# Patient Record
Sex: FEMALE | Race: WHITE
[De-identification: names, ages, dates, MRNs, and addresses within clinical notes are randomized per-mention and may not be internally consistent; named-entity substitution may affect disease eponyms.]

---

## 2017-03-23 ENCOUNTER — HOSPITAL ENCOUNTER (OUTPATIENT)
Dept: HOSPITAL 23 - SGUS | Age: 72
Discharge: HOME | End: 2017-03-23
Payer: MEDICARE

## 2017-03-23 DIAGNOSIS — N18.3: Primary | ICD-10-CM

## 2017-04-12 ENCOUNTER — OFFICE (AMBULATORY)
Dept: URBAN - METROPOLITAN AREA CLINIC 75 | Facility: CLINIC | Age: 72
End: 2017-04-12

## 2017-04-12 VITALS
HEIGHT: 67 IN | WEIGHT: 165 LBS | DIASTOLIC BLOOD PRESSURE: 70 MMHG | SYSTOLIC BLOOD PRESSURE: 130 MMHG | HEART RATE: 56 BPM

## 2017-04-12 DIAGNOSIS — Z12.11 ENCOUNTER FOR SCREENING FOR MALIGNANT NEOPLASM OF COLON: ICD-10-CM

## 2017-04-12 DIAGNOSIS — K29.70 GASTRITIS, UNSPECIFIED, WITHOUT BLEEDING: ICD-10-CM

## 2017-04-12 DIAGNOSIS — K30 FUNCTIONAL DYSPEPSIA: ICD-10-CM

## 2017-04-12 DIAGNOSIS — R10.84 GENERALIZED ABDOMINAL PAIN: ICD-10-CM

## 2017-04-12 DIAGNOSIS — K57.30 DIVERTICULOSIS OF LARGE INTESTINE WITHOUT PERFORATION OR ABS: ICD-10-CM

## 2017-04-12 DIAGNOSIS — K44.9 DIAPHRAGMATIC HERNIA WITHOUT OBSTRUCTION OR GANGRENE: ICD-10-CM

## 2017-04-12 DIAGNOSIS — K62.5 HEMORRHAGE OF ANUS AND RECTUM: ICD-10-CM

## 2017-04-12 DIAGNOSIS — K22.70 BARRETT'S ESOPHAGUS WITHOUT DYSPLASIA: ICD-10-CM

## 2017-04-12 DIAGNOSIS — R11.0 NAUSEA: ICD-10-CM

## 2017-04-12 DIAGNOSIS — K31.7 POLYP OF STOMACH AND DUODENUM: ICD-10-CM

## 2017-04-12 PROCEDURE — 99213 OFFICE O/P EST LOW 20 MIN: CPT | Performed by: INTERNAL MEDICINE

## 2017-10-17 VITALS
HEART RATE: 52 BPM | SYSTOLIC BLOOD PRESSURE: 116 MMHG | WEIGHT: 173 LBS | HEIGHT: 67 IN | DIASTOLIC BLOOD PRESSURE: 70 MMHG

## 2017-10-18 ENCOUNTER — OFFICE (AMBULATORY)
Dept: URBAN - METROPOLITAN AREA CLINIC 75 | Facility: CLINIC | Age: 72
End: 2017-10-18

## 2017-10-18 DIAGNOSIS — K44.9 DIAPHRAGMATIC HERNIA WITHOUT OBSTRUCTION OR GANGRENE: ICD-10-CM

## 2017-10-18 DIAGNOSIS — K22.70 BARRETT'S ESOPHAGUS WITHOUT DYSPLASIA: ICD-10-CM

## 2017-10-18 DIAGNOSIS — Z12.11 ENCOUNTER FOR SCREENING FOR MALIGNANT NEOPLASM OF COLON: ICD-10-CM

## 2017-10-18 DIAGNOSIS — K29.70 GASTRITIS, UNSPECIFIED, WITHOUT BLEEDING: ICD-10-CM

## 2017-10-18 DIAGNOSIS — R11.0 NAUSEA: ICD-10-CM

## 2017-10-18 DIAGNOSIS — R10.84 GENERALIZED ABDOMINAL PAIN: ICD-10-CM

## 2017-10-18 DIAGNOSIS — K30 FUNCTIONAL DYSPEPSIA: ICD-10-CM

## 2017-10-18 DIAGNOSIS — K62.5 HEMORRHAGE OF ANUS AND RECTUM: ICD-10-CM

## 2017-10-18 DIAGNOSIS — K57.30 DIVERTICULOSIS OF LARGE INTESTINE WITHOUT PERFORATION OR ABS: ICD-10-CM

## 2017-10-18 DIAGNOSIS — K31.7 POLYP OF STOMACH AND DUODENUM: ICD-10-CM

## 2017-10-18 PROCEDURE — 99213 OFFICE O/P EST LOW 20 MIN: CPT | Performed by: INTERNAL MEDICINE

## 2017-10-18 RX ORDER — PANTOPRAZOLE SODIUM 40 MG/1
40 TABLET, DELAYED RELEASE ORAL
Qty: 90 | Refills: 2 | Status: COMPLETED
End: 2023-11-20

## 2017-10-18 NOTE — SERVICENOTES
patient is seen and examined by me.  Pertinent issues of the case reviewed with ROBBY Hendricks...I have verified the note and agree with the treatment plan

## 2017-10-18 NOTE — SERVICEHPINOTES
10/17/2017  [address]  1945   was seen in our [facility] office today for a follow up visit.  Patient has a history of GERD and Infante's esophagus.  At present, she states that her heartburn is well-controlled on once daily pantoprazole and Zegerid.  She'll have occasional breakthrough symptoms maybe 3 times per month that she associates this with diet.BRhad right thyroidectomy and parathyroid resection since last visit  some tightness in neck since then Of note, she had a thyroidectomy and parathyroidectomy done 10/2016.  Since that time she states that she feels her throat is "tight" however,  does not feel/report that it impacts her ability to swallow.  She denies dysphagia, odynophagia, food impaction, nausea, vomiting, abdominal pain, bloating or distention.  She had an EGD 10/2016 which showed Infante's esophagus without dysplasia.  She does not report any lower GI issues today.  She denies blood in her stool, melena or unexplained weight loss.  She had a colonoscopy in 7/16/2012 that showed moderate diverticulosis of the descending and sigmoid colon.    4/12/2017 [address] 1945 was seen in our [facility] office today for a follow up visit. EGD reviewedBRtells me stage III kidney disease now diagnosed but Cr was 1.2 in 2016 and 1.1 now but GFR is low 44-49BRshe is worried that Protonix did it wants to see a NephrologistBRconstant burning in epigastrium this is not new 10/10/16: EGDBRto have thyroidectomy and a parathyroidectomy on 10/24BRelevated calcium and nodules9/7/16: I did have the pleasure to see Kristi in the office today for routine follow-up. She is due for her next endoscopy to survey her Infante's. Overall she's been doing well from that standpoint she does continue on twice a day Protonix for this ---has been doing for a while. She will occasionally have some throat burning and maybe some acid regurgitation but symptoms of really been under good control recently.She's undergoing a workup for elevated calcium and have a parathyroid evaluation. She is seeing Dr. Lira from endocrinology. Also has nodules on thyroid.Bowel function fairly normal although occasionally erratic she has very severe diverticular disease she's been suffering from some generalized abdominal discomfort recently which have been suspected to be secondary to her calcium disruption however she has some upper abdominal discomfort, goes in a bandlike fashion it usually precedes a bowel movement. She's not had any fevers or chills. She occasionally see some mucus in the stool. She still rarely sees a small amount of blood which we have assumed to be hemorrhoidal. She's not had any massive hematochezia or melena. Her swallowing is good. Her weight is stable areaBR[HPI dictated in exam room in the presence of the patient by Dr. Mcguire using [employer] voice recognition. Document has been proof-read, however some grammatical errors may be present and are unintentional, given the subtleties and finesse of spoken language as such, changes may be made at a later date to rectify these errors]Pertinent positive symptoms include abdominal pain, belching, difficulty swallowing, flatulence/rectal gas, heartburn/reflux, mucous in stools pertinent negative symptoms include black stools, bloating, change in bowel habits, constipation, diarrhea, painful swallowing, nausea, painful stools, rectal bleeding, rectal protusions, rectal urgency, soiling/incontinence, vomiting.****Impression: Overall she seems to be doing fairly well but I think a couple of issues require attention.I would like her to reduce her Protonix to once daily. If her symptoms do not allow her to do that that we will go back to twice daily. In substitution for the second dose of Protonix I suggested some over-the-counter Zegerid which she can use in the evenings if she sees it necessary. This will of course depend on how late she eats because when she lays down at night when she might have regurgitation.We will do her endoscopy to check on her Infante's.I do have concerns about possible some low-grade diverticulitis but I would not treat her with antibiotics right now I would not do a CAT scan nor would I do another colonoscopy but I asked her to change her diet and do some thinks to prevent progression of her diverticular disease. If symptoms persist we will do a CAT scan possibly a brief course of Flagyl but I would not do that right now.I believe her bleeding is secondary to hemorrhoids and I would not do anything further right now because it is sufficiently infrequent if it worsens however she needs to let me know and we will address that.****BR?7/1/15: KRISTI REINOSO is seen today for a follow-up visit. I did have the pleasure to see Kristi in the office today. She her bowel movements have been normal but she has noticed some bloody mucus on the tissue paper after bowel movements. She denies any blood in the stool. Stool calibers been normal. Stool habits have been regular. No anal rectal pain. She has long-standing epigastric discomfort that has been there for years and has not changed. Her reflux is under good control and she has regular check ups for her Infante's esophagus, last of which was September 2014. She does not any urinary symptoms. No vaginal discharge. She states that when she cleans up after a bowel movement she will notice a small amount of blood-tinged mucus on the tissue. This has happened 3 times. The been no weight loss, fevers or chills, and no one else at home is been ill. There has been no recent travel

## 2018-04-18 ENCOUNTER — OFFICE (AMBULATORY)
Dept: URBAN - METROPOLITAN AREA CLINIC 75 | Facility: CLINIC | Age: 73
End: 2018-04-18

## 2018-04-18 VITALS
HEIGHT: 67 IN | SYSTOLIC BLOOD PRESSURE: 146 MMHG | HEART RATE: 60 BPM | DIASTOLIC BLOOD PRESSURE: 80 MMHG | WEIGHT: 173 LBS

## 2018-04-18 DIAGNOSIS — K30 FUNCTIONAL DYSPEPSIA: ICD-10-CM

## 2018-04-18 DIAGNOSIS — K57.30 DIVERTICULOSIS OF LARGE INTESTINE WITHOUT PERFORATION OR ABS: ICD-10-CM

## 2018-04-18 DIAGNOSIS — Z12.11 ENCOUNTER FOR SCREENING FOR MALIGNANT NEOPLASM OF COLON: ICD-10-CM

## 2018-04-18 DIAGNOSIS — K44.9 DIAPHRAGMATIC HERNIA WITHOUT OBSTRUCTION OR GANGRENE: ICD-10-CM

## 2018-04-18 DIAGNOSIS — K31.7 POLYP OF STOMACH AND DUODENUM: ICD-10-CM

## 2018-04-18 DIAGNOSIS — Z95.0 PRESENCE OF CARDIAC PACEMAKER: ICD-10-CM

## 2018-04-18 DIAGNOSIS — R11.0 NAUSEA: ICD-10-CM

## 2018-04-18 DIAGNOSIS — K22.70 BARRETT'S ESOPHAGUS WITHOUT DYSPLASIA: ICD-10-CM

## 2018-04-18 DIAGNOSIS — R10.84 GENERALIZED ABDOMINAL PAIN: ICD-10-CM

## 2018-04-18 DIAGNOSIS — N18.9 CHRONIC KIDNEY DISEASE, UNSPECIFIED: ICD-10-CM

## 2018-04-18 DIAGNOSIS — K29.70 GASTRITIS, UNSPECIFIED, WITHOUT BLEEDING: ICD-10-CM

## 2018-04-18 DIAGNOSIS — K62.5 HEMORRHAGE OF ANUS AND RECTUM: ICD-10-CM

## 2018-04-18 PROCEDURE — 99213 OFFICE O/P EST LOW 20 MIN: CPT | Performed by: INTERNAL MEDICINE

## 2018-04-18 RX ORDER — PANTOPRAZOLE SODIUM 40 MG/1
40 TABLET, DELAYED RELEASE ORAL
Qty: 90 | Refills: 2 | Status: COMPLETED
End: 2023-11-20

## 2018-08-15 ENCOUNTER — APPOINTMENT (OUTPATIENT)
Dept: GENERAL RADIOLOGY | Facility: HOSPITAL | Age: 73
End: 2018-08-15

## 2018-08-15 ENCOUNTER — APPOINTMENT (OUTPATIENT)
Dept: CT IMAGING | Facility: HOSPITAL | Age: 73
End: 2018-08-15

## 2018-08-15 ENCOUNTER — HOSPITAL ENCOUNTER (EMERGENCY)
Facility: HOSPITAL | Age: 73
Discharge: HOME | End: 2018-08-15
Attending: EMERGENCY MEDICINE | Admitting: EMERGENCY MEDICINE

## 2018-08-15 VITALS
WEIGHT: 167 LBS | OXYGEN SATURATION: 100 % | HEART RATE: 61 BPM | RESPIRATION RATE: 18 BRPM | DIASTOLIC BLOOD PRESSURE: 78 MMHG | SYSTOLIC BLOOD PRESSURE: 174 MMHG | TEMPERATURE: 98 F | HEIGHT: 67 IN | BODY MASS INDEX: 26.21 KG/M2

## 2018-08-15 DIAGNOSIS — S46.912A SHOULDER STRAIN, LEFT, INITIAL ENCOUNTER: ICD-10-CM

## 2018-08-15 DIAGNOSIS — S16.1XXA CERVICAL STRAIN, ACUTE, INITIAL ENCOUNTER: ICD-10-CM

## 2018-08-15 DIAGNOSIS — S63.501A WRIST SPRAIN, RIGHT, INITIAL ENCOUNTER: ICD-10-CM

## 2018-08-15 DIAGNOSIS — S09.90XA MINOR HEAD INJURY, INITIAL ENCOUNTER: Primary | ICD-10-CM

## 2018-08-15 PROCEDURE — 70450 CT HEAD/BRAIN W/O DYE: CPT

## 2018-08-15 PROCEDURE — 99284 EMERGENCY DEPT VISIT MOD MDM: CPT

## 2018-08-15 PROCEDURE — 73030 X-RAY EXAM OF SHOULDER: CPT

## 2018-08-15 PROCEDURE — 72125 CT NECK SPINE W/O DYE: CPT

## 2018-08-15 PROCEDURE — 73110 X-RAY EXAM OF WRIST: CPT

## 2018-08-15 RX ORDER — FUROSEMIDE 40 MG/1
40 TABLET ORAL DAILY PRN
COMMUNITY
End: 2021-02-09

## 2018-08-15 RX ORDER — ACETAMINOPHEN 500 MG
1000 TABLET ORAL ONCE
Status: COMPLETED | OUTPATIENT
Start: 2018-08-15 | End: 2018-08-15

## 2018-08-15 RX ADMIN — ACETAMINOPHEN 1000 MG: 500 TABLET, FILM COATED ORAL at 09:32

## 2018-08-15 NOTE — DISCHARGE INSTRUCTIONS
Pt instructions:  Rest  Follow-up with your primary care doctor to schedule outpatient CAT scan with contrast to further evaluate aneurysm  Follow up with Primary Care Doctor for further management and to have your blood pressure rechecked.   Return to ER with pain, swelling, numbness/tingling, fever, chills, weakness, nausea, vomiting, diarrhea, abdominal pain, back pain, urinary concerns, chest pain, shortness of breath, dizziness, headache, worsening of symptoms or other concerns.

## 2018-08-15 NOTE — ED PROVIDER NOTES
MD ATTESTATION NOTE    Pt presents to the ED complaining of right wrist pain, left shoulder pain and a blow to the head s/p trip and fall earlier today. Pt's R wrist XR shows nothing acute. On exam, the pt has tenderness and swelling along her right distal radius without obvious deformity. Pt is NVI distally. I agree with the plan to discharge the pt home in a wrist splint.    The ALICIA and I have discussed this patient's history, physical exam, and treatment plan.  I have reviewed the documentation and personally had a face to face interaction with the patient. I affirm the documentation and agree with the treatment and plan.  The attached note describes my personal findings.    Documentation assistance provided by carlos Fuentes for Dr. Alex.  Information recorded by the bessieibe was done at my direction and has been verified and validated by me.       Susu Fuentes  08/15/18 [id number]       Simeon Alex MD  08/15/18 [id number]

## 2018-08-15 NOTE — ED PROVIDER NOTES
EMERGENCY DEPARTMENT ENCOUNTER    CHIEF COMPLAINT  Chief Complaint: injuries after a fall  History given by:patient  History limited by:nothing  Room Number: 26/26  PMD: Renita Price MD      HPI:  Pt is a 73 y.o. female who presents with injury sustained after fall.  Patient was walking into the hospital when she tripped on the curb and fell onto the grass.  Also reports hitting her head but denies LOC.  Patient also was complaining of neck pain, left shoulder pain, right wrist pain.  Patient denies chest pain, shortness of breath, dizziness, or confusion prior to or after the fall.  Patient states that she's been under a lot of stress lately because her  is in the hospital critically ill and she was in a hurry to get to him.     Duration: PTA  Location:head, neck, left shoulder, right wrist  Radiation:denies  Quality:sore/throbbing  Intensity/Severity:mild  Associated Symptoms: swelling of right wrist  Aggravating Factors:movement  Alleviating Factors:rest  Previous Episodes:denies  Treatment before arrival:none    PAST MEDICAL HISTORY  Active Ambulatory Problems     Diagnosis Date Noted   • No Active Ambulatory Problems     Resolved Ambulatory Problems     Diagnosis Date Noted   • No Resolved Ambulatory Problems     Past Medical History:   Diagnosis Date   • Acid reflux    • Arthritis    • Francisco esophagus    • Cancer (CMS/HCC)    • CHF (congestive heart failure) (CMS/HCC)    • Disease of thyroid gland    • Heart murmur    • Hyperlipidemia    • Hypertension    • Leaky heart valve    • Osteopenia    • Renal disorder    • Renal insufficiency syndrome    • Sick sinus syndrome due to SA node dysfunction (CMS/HCC)        PAST SURGICAL HISTORY  Past Surgical History:   Procedure Laterality Date   • CYST REMOVAL Right    • HYSTERECTOMY     • PACEMAKER IMPLANTATION     • PARATHYROID GLAND SURGERY     • THYROID SURGERY         FAMILY HISTORY  History reviewed. No pertinent family history.    SOCIAL  HISTORY  Social History     Social History   • Marital status:      Spouse name: N/A   • Number of children: N/A   • Years of education: N/A     Occupational History   • Not on file.     Social History Main Topics   • Smoking status: Former Smoker     Packs/day: 1.50     Years: 25.00     Types: Cigarettes     Quit date: 1991   • Smokeless tobacco: Not on file   • Alcohol use Yes      Comment: occasional   • Drug use: No   • Sexual activity: Defer     Other Topics Concern   • Not on file     Social History Narrative   • No narrative on file         ALLERGIES  Codeine and Morphine and related    REVIEW OF SYSTEMS  Review of Systems   Constitutional: Negative.  Negative for activity change, appetite change ( decreased), chills, fatigue and fever.   HENT: Negative for congestion, ear pain, rhinorrhea and sore throat.    Eyes: Negative.    Respiratory: Negative.  Negative for cough and shortness of breath.    Cardiovascular: Negative.  Negative for chest pain, palpitations and leg swelling ( pedal).   Gastrointestinal: Negative.  Negative for abdominal pain, constipation, diarrhea, nausea and vomiting.   Endocrine: Negative.    Genitourinary: Negative.  Negative for decreased urine volume, difficulty urinating, dysuria, menstrual problem, pelvic pain, urgency and vaginal discharge.   Musculoskeletal: Positive for arthralgias ( right wrist and left shoulder), back pain and neck pain.   Skin: Negative.  Negative for rash.   Allergic/Immunologic: Negative.    Neurological: Positive for headaches. Negative for dizziness, weakness, light-headedness and numbness.   Hematological: Negative.    Psychiatric/Behavioral: Negative.  The patient is not nervous/anxious.    All other systems reviewed and are negative.      PHYSICAL EXAM  ED Triage Vitals [08/15/18 0830]   Temp Heart Rate Resp BP SpO2   98 °F (36.7 °C) 80 18 -- 100 %      Temp src Heart Rate Source Patient Position BP Location FiO2 (%)   Tympanic Monitor -- -- --        Physical Exam   Constitutional: She is well-developed, well-nourished, and in no distress. No distress.   HENT:   Head: Normocephalic and atraumatic.   Mouth/Throat: Oropharynx is clear and moist and mucous membranes are normal.   Eyes: Pupils are equal, round, and reactive to light.   Neck: Normal range of motion.   Cardiovascular: Normal rate, regular rhythm and normal heart sounds.    Pulses:       Radial pulses are 2+ on the right side, and 2+ on the left side.   Pulmonary/Chest: Effort normal and breath sounds normal. She has no wheezes.   Abdominal: Soft. Bowel sounds are normal. There is no tenderness.   Musculoskeletal: She exhibits no edema.        Right shoulder: Normal.        Left shoulder: She exhibits pain. She exhibits normal range of motion, no tenderness, no bony tenderness, no spasm, normal pulse and normal strength.        Right elbow: Normal.       Left elbow: Normal.        Right wrist: She exhibits decreased range of motion, tenderness and swelling.        Left wrist: Normal.        Right hip: Normal.        Left hip: Normal.        Right knee: Normal.        Left knee: Normal.        Cervical back: She exhibits pain. She exhibits normal range of motion, no tenderness, no bony tenderness, no swelling and no spasm.        Thoracic back: Normal.        Lumbar back: Normal.        Right forearm: She exhibits tenderness and swelling.   Neurological: She is alert.   Skin: Skin is warm and dry. No rash noted.   Psychiatric: Mood, memory, affect and judgment normal.   Nursing note and vitals reviewed.      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  Spoke with Dr. Velasquez regarding CT scan results      PROGRESS AND CONSULTS    Reviewed pt's history and workup with Dr. Alex.  After a bedside evaluation; Dr Alex agrees with the plan of care    1010-discussed x-ray and CAT scan findings with patient and friend.  Discussed incidental finding of aneurysm on CAT scan recommended patient follow-up with primary care doctor to schedule an outpatient CTA.  Patient friend verbalized understanding of all findings and agree with plan.  Patient offered prescription for pain medication which she declined.     COURSE & MEDICAL DECISION MAKING  Pertinent  Imaging studies that were ordered and reviewed are noted above.  Results were reviewed/discussed with the patient and they were also made aware of online assess.       MEDICATIONS GIVEN IN ER  Medications   acetaminophen (TYLENOL) tablet 1,000 mg (1,000 mg Oral Given 8/15/18 0932)       /78   Pulse 61   Temp 98 °F (36.7 °C) (Tympanic)   Resp 18   Ht 170.2 cm (67\")   Wt 75.8 kg (167 lb)   SpO2 100%   BMI 26.16 kg/m²     Discussed all results and noted any abnormalities with patient.  Discussed absoute need to recheck abnormalities with PMD    Reviewed implications of results, diagnosis, meds, responsibility to follow up, warning signs and symptoms of possible worsening, potential complications and reasons to return to ER with patient    Discussed plan for discharge, as there is no emergent indication for admission.  Pt is agreeable and understands need for follow up and repeat testing.  Pt is aware that discharge does not mean that nothing is wrong but it indicates no emergency is present and they must continue care with PMD.  Pt is discharged with instructions to follow up with primary care doctor to have their blood pressure rechecked. "       DIAGNOSIS  Final diagnoses:   Minor head injury, initial encounter   Wrist sprain, right, initial encounter   Shoulder strain, left, initial encounter   Cervical strain, acute, initial encounter       FOLLOW UP   Renita Price MD  [address] [address]  [phone]    Schedule an appointment as soon as possible for a visit         RX     Medication List      No changes were made to your prescriptions during this visit.            Trini Jordan, APRN  08/15/18 1014

## 2018-08-15 NOTE — ED NOTES
"Patient presents to ED after fall in parking lot.  Patient notes walking up to building and missing curb when she tried to step onto it.  Patient notes falling and landing on right side of head, left shoulder.  Patient denies any dizziness that lead to fall, denies blood thinners.  Patient denies any shortness of breath, nausea, dizziness, confusion, or headache after fall.  Patient is currently alert and oriented time.  Patient complains of right forearm pain and wrist pain, along with pain to left middle digit.  Patient notes left shoulder pain and bilateral neck tenderness with no cspine tenderness.  Patient became tearful during assessment of neck, states \"Can I be honest with you?  My  has been really sick and I have found that I have really been pushing myself working around the house taking care of him so I don't know if my neck hurts because of the fall or because of how much I have been exerted myself.\"  Patient expressed concern of left ring on left ring finger becoming too tight, attempted to remove with lubricant to no avail.  [place] [employer] [place] ring cutter attempted to remove ring but patient refused ring removal at this time.        Alex Bowles RN  08/15/18 0901    "

## 2018-10-16 ENCOUNTER — OFFICE VISIT (OUTPATIENT)
Dept: NEUROSURGERY | Facility: CLINIC | Age: 73
End: 2018-10-16

## 2018-10-16 VITALS
BODY MASS INDEX: 26.68 KG/M2 | WEIGHT: 170 LBS | SYSTOLIC BLOOD PRESSURE: 162 MMHG | RESPIRATION RATE: 16 BRPM | HEIGHT: 67 IN | DIASTOLIC BLOOD PRESSURE: 70 MMHG | HEART RATE: 60 BPM

## 2018-10-16 DIAGNOSIS — I49.5 SICK SINUS SYNDROME DUE TO SA NODE DYSFUNCTION (HCC): ICD-10-CM

## 2018-10-16 DIAGNOSIS — I38 LEAKY HEART VALVE: ICD-10-CM

## 2018-10-16 DIAGNOSIS — I67.1 CEREBRAL ANEURYSM WITHOUT RUPTURE: Primary | ICD-10-CM

## 2018-10-16 DIAGNOSIS — I10 HYPERTENSION, UNSPECIFIED TYPE: ICD-10-CM

## 2018-10-16 DIAGNOSIS — N28.9 RENAL INSUFFICIENCY SYNDROME: ICD-10-CM

## 2018-10-16 PROCEDURE — 99204 OFFICE O/P NEW MOD 45 MIN: CPT | Performed by: NEUROLOGICAL SURGERY

## 2018-10-16 RX ORDER — ROPINIROLE 1 MG/1
1 TABLET, FILM COATED ORAL 2 TIMES DAILY
COMMUNITY
Start: 2018-09-26 | End: 2021-02-09

## 2018-10-16 RX ORDER — AZELASTINE HCL 205.5 UG/1
1 SPRAY NASAL 2 TIMES DAILY
COMMUNITY
Start: 2018-09-15 | End: 2018-12-10 | Stop reason: SINTOL

## 2018-10-16 RX ORDER — LEVOTHYROXINE SODIUM 0.07 MG/1
75 TABLET ORAL DAILY
COMMUNITY
Start: 2018-09-15 | End: 2021-02-09

## 2018-10-16 RX ORDER — ASCORBIC ACID 500 MG
500 TABLET ORAL DAILY
COMMUNITY
End: 2021-02-09

## 2018-10-16 RX ORDER — ACETAMINOPHEN 500 MG
1000 TABLET ORAL EVERY 6 HOURS PRN
COMMUNITY
End: 2021-02-09

## 2018-10-16 RX ORDER — OMEPRAZOLE 20 MG/1
20 CAPSULE, DELAYED RELEASE ORAL DAILY PRN
COMMUNITY
End: 2019-05-15

## 2018-10-16 RX ORDER — AMLODIPINE BESYLATE 5 MG/1
5 TABLET ORAL DAILY
COMMUNITY
Start: 2018-08-23 | End: 2020-12-16 | Stop reason: DRUGHIGH

## 2018-10-16 RX ORDER — MONTELUKAST SODIUM 10 MG/1
10 TABLET ORAL DAILY
COMMUNITY
Start: 2018-09-15 | End: 2021-02-09

## 2018-10-16 RX ORDER — CHOLECALCIFEROL (VITAMIN D3) 125 MCG
2000 CAPSULE ORAL DAILY
COMMUNITY

## 2018-10-16 RX ORDER — FLUNISOLIDE 0.25 MG/ML
2 SOLUTION NASAL 2 TIMES DAILY
COMMUNITY
Start: 2018-10-05 | End: 2018-12-10 | Stop reason: SINTOL

## 2018-10-16 RX ORDER — PANTOPRAZOLE SODIUM 40 MG/1
40 TABLET, DELAYED RELEASE ORAL DAILY
COMMUNITY
Start: 2018-08-23 | End: 2021-05-10 | Stop reason: SDUPTHER

## 2018-10-16 RX ORDER — ATORVASTATIN CALCIUM 20 MG/1
20 TABLET, FILM COATED ORAL DAILY
COMMUNITY
Start: 2018-08-23 | End: 2020-02-13 | Stop reason: DRUGHIGH

## 2018-10-16 NOTE — PROGRESS NOTES
"Subjective   Patient ID: Meme Mroales is a 73 y.o. female is being seen for consultation today at the request of Renita Price MD for incidental discovery of cerebral aneurysm. She is accompanied by her sister Lisy.    History of Present Illness    She presents to the office today for further evaluation for incidental finding of a cerebral aneurysm.  She fell in August sustaining a right frontal contusion and had neck pain following.  She underwent CT imaging of the head and neck for further evaluation.  There was an incidental finding of a left ophthalmic artery origin aneurysm and CTA was ordered for further evaluation.  The CTA revealed a large left paraclinoid aneurysm and she was referred to our office for further evaluation.    She is unaware of any family history of aneurysms.  She denies any headaches, dizziness or lightheadedness.  She also denies any double or blurry vision.  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  The patient's sister, who is a nurse, is concerned because she is constantly having to lift and pull her  who is a large man.    She presents with her sister.      /70 (BP Location: Left arm, Patient Position: Sitting, Cuff Size: Adult)   Pulse 60   Resp 16   Ht 170.2 cm (67\")   Wt 77.1 kg (170 lb)   BMI 26.63 kg/m²        The following portions of the patient's history were reviewed and updated as appropriate: allergies, current medications, past family history, past medical history, past social history, past surgical history and problem list.    Review of Systems   Constitutional: Negative for fever.   HENT: Positive for trouble swallowing (christian's esophagus).    Eyes: Negative for visual disturbance.   Respiratory: Negative for cough and wheezing.    Cardiovascular: Positive for palpitations. Negative for chest pain.   Gastrointestinal: Negative for abdominal pain.   Genitourinary: Negative for difficulty urinating.   Musculoskeletal: " Negative for gait problem.   Skin: Negative for rash.   Neurological: Positive for light-headedness. Negative for dizziness, speech difficulty and headaches.   Psychiatric/Behavioral: Negative for decreased concentration.       Objective   Physical Exam   Constitutional: She is oriented to person, place, and time. She appears well-developed and well-nourished. She appears lethargic.  Non-toxic appearance. She does not have a sickly appearance. She does not appear ill.   Very pleasant, well-appearing older female   HENT:   Head: Normocephalic and atraumatic.   Neck: Neck supple. No tracheal deviation present.   Cardiovascular: Normal heart sounds and intact distal pulses.    Pulmonary/Chest: Effort normal and breath sounds normal.   Abdominal: Soft.   Musculoskeletal: Normal range of motion. She exhibits no tenderness or deformity.   CRANE well   Neurological: She is oriented to person, place, and time. She has normal strength. She appears lethargic. She displays no tremor. No cranial nerve deficit. Gait normal. Coordination and gait normal. GCS eye subscore is 4. GCS verbal subscore is 5. GCS motor subscore is 6.   Reflex Scores:       Tricep reflexes are 2+ on the right side and 2+ on the left side.       Bicep reflexes are 2+ on the right side and 2+ on the left side.       Brachioradialis reflexes are 2+ on the right side and 3+ on the left side.       Patellar reflexes are 2+ on the right side and 2+ on the left side.       Achilles reflexes are 2+ on the right side and 2+ on the left side.  Gait is stable and upright  Able to heel and toe walk, able to tandem     Skin: Skin is warm and dry.   Psychiatric: She has a normal mood and affect. Her speech is normal and behavior is normal. Thought content normal.   Vitals reviewed.    Neurologic Exam     Mental Status   Oriented to person, place, and time.   Oriented to person.   Oriented to place.   Attention: normal. Concentration: normal.   Speech: speech is normal    Level of consciousness: alert    Cranial Nerves     CN II   Visual fields full to confrontation.   Visual acuity: normal  Right visual field deficit: none  Left visual field deficit: none     CN III, IV, VI   Right pupil: Size: 3 mm. Shape: regular. Reactivity: brisk.   Left pupil: Size: 3 mm. Shape: regular. Reactivity: brisk.   CN III: no CN III palsy  CN VI: no CN VI palsy  Nystagmus: none   Diplopia: none    CN V   Facial sensation intact.   Right facial sensation deficit: none  Left facial sensation deficit: none    CN VII   Facial expression full, symmetric.   Right facial weakness: none  Left facial weakness: none    CN VIII   CN VIII normal.   Hearing: intact    CN IX, X   CN IX normal.     CN XI   CN XI normal.   Right sternocleidomastoid strength: normal  Left sternocleidomastoid strength: normal    CN XII   CN XII normal.   Tongue: not atrophic    Motor Exam   Muscle bulk: normal  Right arm tone: normal  Left arm tone: normal    Strength   Strength 5/5 throughout.     Gait, Coordination, and Reflexes     Gait  Gait: normal    Tremor   Resting tremor: absent    Reflexes   Right brachioradialis: 2+  Left brachioradialis: 3+  Right biceps: 2+  Left biceps: 2+  Right triceps: 2+  Left triceps: 2+  Right patellar: 2+  Left patellar: 2+  Right achilles: 2+  Left achilles: 2+      Assessment/Plan   Independent Review of Radiographic Studies:    I personally reviewed a CTA of head done recently that demonstrates a partially calcified left periophthalmic artery region aneurysm measuring in its greatest diameter 1 cm.  Medical Decision Making:    I confirmed and obtained the above history as recorded by the nurse practitioner acting as a scribe. I performed the above examination and it is documented by the nurse practitioner acting as a scribe.    The patient presents with asymptomatic left periophthalmic artery region aneurysm that is partially calcified.    Treatment options include observation with serial  imaging to see whether or not there is any change in the aneurysm itself.  There is a change in the aneurysm at that point consider treatment.  Other options including treatment of the aneurysm.  Because the aneurysm was partially calcified I would recommend an endovascular approach to treatment since the calcification of the aneurysm at that point is absolutely not important.  In doing an open surgical procedure calcified aneurysm is more prone to rupture when we placed clip across the neck of the aneurysm is somewhat fragile like a piece of porcelain.    I went over the various options of treatment available for unruptured aneurysms. I explained that an aneurysm greater than 7 mm in size carries a 1-2% risk of rupture on a yearly basis. I explained that the natural history of an aneurysm if it were to rupture included 30% of patients dying immediately. Of the survivors, 30% would die despite completely appropriate medical treatment, 30% would be severely disabled, and 30% would have a satisfactory result from treatment of a ruptured aneurysm. I also explained the risks, benefits and alternatives of treatment of an aneurysm from either an endovascular or open surgical procedure.    I went over the signs of rupture/leakage of aneurysm including sudden onset of severe headaches, vision problems, balance changes, speech difficulties, loss of consciousness, seizures, nausea/vomiting, etcetera and instructed the patient to seek immediate medical attention should these occur.    The risks, benefits and alternatives of cerebral angiography with embolization of the aneurysm were explained in detail to the patient. The alternative is not to undergo this procedure. The benefit of cerebral angiography with embolization should be, though is not guaranteed, the elucidation of the vascular anatomy of the brain and the blood vessels leading to the brain, and placement of a series of coils into the aneurysm in order to prevent  blood from flowing into it, thus potentially preventing rupture of the aneurysm. The risks of the procedure include, but are not limited to, the possibility of stroke, bleeding, blindness, damage to an artery in the brain, groin or neck (possibly requiring surgery to correct), infection, reaction to the contrast dye resulting in itching, swelling, anaphylaxis or even death, lack of ability to perform the procedure, need for further operative intervention, lack of ability to completely obliterate the aneurysm, reopening of the aneurysm, need for further embolization treatment or even open surgery to obliterate the aneurysm, compaction of the coils within the aneurysm causing possible reopening or rupture, etcetera. The patient voiced understanding of the risks, benefits and alternatives.    The risks, benefits and alternatives of cerebral angiography and with embolization and FDA approved Neuroform stent placement for use in the affected artery to support coil embolization were explained in detail to the patient. The alternative is not to undergo this procedure. The benefit of cerebral angiography with embolization should be, though is not guaranteed, the elucidation of the vascular anatomy of the brain and the blood vessels leading to the brain, and placement of a series of coils into the aneurysm in order to prevent blood from flowing into it, thus potentially preventing rupture of the aneurysm. The benefit of FDA stenting is that it is less invasive than other surgical procedures and provides a network of support for the coils. The risks of the procedure include, but are not limited to, the possibility of stroke, bleeding, blindness, damage to an artery in the brain, groin or neck (possibly requiring surgery to correct), infection, reaction to the contrast dye resulting in itching, swelling, anaphylaxis or even death, lack of ability to perform the procedure, need for further operative intervention, lack of  ability to completely obliterate the aneurysm, reopening of the aneurysm, need for further embolization treatment or even open surgery to obliterate the aneurysm, compaction of the coils within the aneurysm causing possible reopening or rupture, etcetera. The patient voiced understanding of the risks, benefits and alternatives.    After a complete physical exam, the patient has been informed of the consequences, benefits, appropriate us, and office policies regarding the medication being prescribed. A SOLA check will be made on-line, and will be repeated if prescription is renewed after a 90 day period. The patient agrees to adhering to the medication regimen as prescribed.    The patient has been advised that we will manage post-operative pain for 1 month. If further narcotic medication is needed beyond that period, a referral back to the primary care physician or to a pain management specialist will be made. If the patient cancels or fails to show for scheduled follow-up visits or the pain management referral,  further narcotic prescriptions from this practice may cease.    The patient is to call over this next week to let me know how she would like to proceed.      Meme was seen today for cerebral aneurysm.    Diagnoses and all orders for this visit:    Cerebral aneurysm without rupture  -     Case Request; Standing  -     CBC and Differential; Future  -     Basic metabolic panel; Future  -     XR chest 1 vw; Future  -     sodium chloride 0.9 % infusion; Infuse 100 mL/hr into a venous catheter Continuous.  -     ceFAZolin (ANCEF) 2 g in sodium chloride 0.9 % 100 mL IVPB; Infuse 2 g into a venous catheter 1 (One) Time.  -     Case Request  -     aspirin chewable tablet 81 mg; Chew 1 tablet Daily.    Hypertension, unspecified type    Renal insufficiency syndrome    Leaky heart valve    Sick sinus syndrome due to SA node dysfunction (CMS/HCC)    Other orders  -     Follow anesthesia standing orders.  -      Obtain informed consent  -     Clorhexidine skin prep; Future  -     Follow Anesthesia Guidelines / Standing Orders; Standing  -     SCD (sequential compression device)- to be placed on patient in Pre-op; Standing  -     Clip operative site; Standing  -     Inpatient Admission; Standing  -     Provide instructions to patient on NPO status; Future  -     Verify NPO Status; Standing  -     clopidogrel (PLAVIX) 75 MG tablet; Take 1 tablet by mouth Daily.      Return for Patient to call this week about surgery..         October 29, 2018: Patient called back and spoke to Ronaldo in the office.  She is requesting to undergo surgical intervention.  She will made to take aspirin and Plavix preoperatively in the event that a Neuroform stent is necessary.

## 2018-10-22 ENCOUNTER — TELEPHONE (OUTPATIENT)
Dept: NEUROSURGERY | Facility: CLINIC | Age: 73
End: 2018-10-22

## 2018-10-22 NOTE — TELEPHONE ENCOUNTER
Patient last seen by ROSARIO for aneurysm 10/16. Informed to call office if she decides to have coiling. Patient has decided that she would like to have this done. Will have Ronaldo call her back.    Patient is having EGD done this afternoon, if does not answer, ok to [facility] [phone]

## 2018-10-22 NOTE — TELEPHONE ENCOUNTER
Will have Hodes enter case request. Called and spoke with patient and informed her of this and told her I will call her when he does this.

## 2018-10-29 RX ORDER — CEFAZOLIN SODIUM 2 G/100ML
2 INJECTION, SOLUTION INTRAVENOUS ONCE
Status: CANCELLED | OUTPATIENT
Start: 2018-11-26 | End: 2018-10-29

## 2018-10-29 RX ORDER — CLOPIDOGREL BISULFATE 75 MG/1
75 TABLET ORAL DAILY
Qty: 30 TABLET | Refills: 3 | Status: SHIPPED | OUTPATIENT
Start: 2018-10-29 | End: 2018-12-10 | Stop reason: SDUPTHER

## 2018-10-29 RX ORDER — SODIUM CHLORIDE 9 MG/ML
100 INJECTION, SOLUTION INTRAVENOUS CONTINUOUS
Status: CANCELLED | OUTPATIENT
Start: 2018-11-26

## 2018-10-29 RX ORDER — ASPIRIN 81 MG/1
81 TABLET, CHEWABLE ORAL DAILY
Status: DISCONTINUED | OUTPATIENT
Start: 2018-10-29 | End: 2018-11-19

## 2018-10-31 ENCOUNTER — TELEPHONE (OUTPATIENT)
Dept: NEUROSURGERY | Facility: CLINIC | Age: 73
End: 2018-10-31

## 2018-10-31 NOTE — TELEPHONE ENCOUNTER
Can you enter orders for plavix and aspirin level testing for patient to be done at preop testing per Dr. Key? Thanks!

## 2018-11-01 ENCOUNTER — PREP FOR SURGERY (OUTPATIENT)
Dept: OTHER | Facility: HOSPITAL | Age: 73
End: 2018-11-01

## 2018-11-03 ENCOUNTER — RESULTS ENCOUNTER (OUTPATIENT)
Dept: NEUROSURGERY | Facility: CLINIC | Age: 73
End: 2018-11-03

## 2018-11-03 DIAGNOSIS — I67.1 CEREBRAL ANEURYSM WITHOUT RUPTURE: ICD-10-CM

## 2018-11-05 ENCOUNTER — TELEPHONE (OUTPATIENT)
Dept: NEUROSURGERY | Facility: CLINIC | Age: 73
End: 2018-11-05

## 2018-11-05 NOTE — TELEPHONE ENCOUNTER
Patient is scheduled for  a procedure on 11/26 for a cerebral angiography. Pt is scheduled for her PAT on 11/19.     Pt says that she was told to start the Plavix 5 days prior to her procedure. She said that she is suppose to have a lab done that shows the plavix in her system. She wants to know when she needs to get the lab done.    Please advise

## 2018-11-05 NOTE — TELEPHONE ENCOUNTER
Pt is scheduled for embolization on November 26 and she was instructed to start plavix 5 days before her preop testing which is scheduled for November 19th. I left message for her to call me and I will give her information again. Also sent a letter out to her with these instructions on 10/31.

## 2018-11-19 ENCOUNTER — HOSPITAL ENCOUNTER (OUTPATIENT)
Dept: GENERAL RADIOLOGY | Facility: HOSPITAL | Age: 73
Discharge: HOME | End: 2018-11-19
Admitting: NEUROLOGICAL SURGERY

## 2018-11-19 ENCOUNTER — APPOINTMENT (OUTPATIENT)
Dept: PREADMISSION TESTING | Facility: HOSPITAL | Age: 73
End: 2018-11-19

## 2018-11-19 ENCOUNTER — OFFICE VISIT (OUTPATIENT)
Dept: NEUROSURGERY | Facility: CLINIC | Age: 73
End: 2018-11-19

## 2018-11-19 ENCOUNTER — TELEPHONE (OUTPATIENT)
Dept: NEUROSURGERY | Facility: CLINIC | Age: 73
End: 2018-11-19

## 2018-11-19 VITALS
DIASTOLIC BLOOD PRESSURE: 76 MMHG | BODY MASS INDEX: 26.68 KG/M2 | HEIGHT: 67 IN | WEIGHT: 170 LBS | HEART RATE: 64 BPM | SYSTOLIC BLOOD PRESSURE: 112 MMHG | RESPIRATION RATE: 18 BRPM

## 2018-11-19 VITALS
TEMPERATURE: 98.1 F | SYSTOLIC BLOOD PRESSURE: 173 MMHG | HEIGHT: 67 IN | WEIGHT: 170.1 LBS | BODY MASS INDEX: 26.7 KG/M2 | OXYGEN SATURATION: 100 % | HEART RATE: 68 BPM | DIASTOLIC BLOOD PRESSURE: 73 MMHG

## 2018-11-19 DIAGNOSIS — I67.1 CEREBRAL ANEURYSM WITHOUT RUPTURE: Primary | ICD-10-CM

## 2018-11-19 DIAGNOSIS — I38 LEAKY HEART VALVE: ICD-10-CM

## 2018-11-19 DIAGNOSIS — I49.5 SICK SINUS SYNDROME DUE TO SA NODE DYSFUNCTION (HCC): ICD-10-CM

## 2018-11-19 DIAGNOSIS — I67.1 CEREBRAL ANEURYSM WITHOUT RUPTURE: ICD-10-CM

## 2018-11-19 LAB
ANION GAP SERPL CALCULATED.3IONS-SCNC: 12.1 MMOL/L
ASA PLATELET INHIBITION: 391 ARU
BASOPHILS # BLD AUTO: 0.05 10*3/MM3 (ref 0–0.2)
BASOPHILS NFR BLD AUTO: 0.8 % (ref 0–1.5)
BUN BLD-MCNC: 26 MG/DL (ref 8–23)
BUN/CREAT SERPL: 23.9 (ref 7–25)
CALCIUM SPEC-SCNC: 10.2 MG/DL (ref 8.6–10.5)
CHLORIDE SERPL-SCNC: 99 MMOL/L (ref 98–107)
CO2 SERPL-SCNC: 28.9 MMOL/L (ref 22–29)
CREAT BLD-MCNC: 1.09 MG/DL (ref 0.57–1)
DEPRECATED RDW RBC AUTO: 47.9 FL (ref 37–54)
EOSINOPHIL # BLD AUTO: 0.72 10*3/MM3 (ref 0–0.7)
EOSINOPHIL NFR BLD AUTO: 11.1 % (ref 0.3–6.2)
ERYTHROCYTE [DISTWIDTH] IN BLOOD BY AUTOMATED COUNT: 13.8 % (ref 11.7–13)
GFR SERPL CREATININE-BSD FRML MDRD: 49 ML/MIN/1.73
GLUCOSE BLD-MCNC: 118 MG/DL (ref 65–99)
HCT VFR BLD AUTO: 38.5 % (ref 35.6–45.5)
HGB BLD-MCNC: 12.4 G/DL (ref 11.9–15.5)
IMM GRANULOCYTES # BLD: 0.01 10*3/MM3 (ref 0–0.03)
IMM GRANULOCYTES NFR BLD: 0.2 % (ref 0–0.5)
LYMPHOCYTES # BLD AUTO: 1.3 10*3/MM3 (ref 0.9–4.8)
LYMPHOCYTES NFR BLD AUTO: 20.1 % (ref 19.6–45.3)
MCH RBC QN AUTO: 30.8 PG (ref 26.9–32)
MCHC RBC AUTO-ENTMCNC: 32.2 G/DL (ref 32.4–36.3)
MCV RBC AUTO: 95.5 FL (ref 80.5–98.2)
MONOCYTES # BLD AUTO: 0.5 10*3/MM3 (ref 0.2–1.2)
MONOCYTES NFR BLD AUTO: 7.7 % (ref 5–12)
NEUTROPHILS # BLD AUTO: 3.91 10*3/MM3 (ref 1.9–8.1)
NEUTROPHILS NFR BLD AUTO: 60.3 % (ref 42.7–76)
PA ADP PRP-ACNC: 117 PRU (ref 194–418)
PLATELET # BLD AUTO: 248 10*3/MM3 (ref 140–500)
PMV BLD AUTO: 11.1 FL (ref 6–12)
POTASSIUM BLD-SCNC: 4 MMOL/L (ref 3.5–5.2)
RBC # BLD AUTO: 4.03 10*6/MM3 (ref 3.9–5.2)
SODIUM BLD-SCNC: 140 MMOL/L (ref 136–145)
WBC NRBC COR # BLD: 6.48 10*3/MM3 (ref 4.5–10.7)

## 2018-11-19 PROCEDURE — 36415 COLL VENOUS BLD VENIPUNCTURE: CPT | Performed by: NEUROLOGICAL SURGERY

## 2018-11-19 PROCEDURE — 93005 ELECTROCARDIOGRAM TRACING: CPT

## 2018-11-19 PROCEDURE — 80048 BASIC METABOLIC PNL TOTAL CA: CPT | Performed by: NEUROLOGICAL SURGERY

## 2018-11-19 PROCEDURE — 85025 COMPLETE CBC W/AUTO DIFF WBC: CPT | Performed by: NEUROLOGICAL SURGERY

## 2018-11-19 PROCEDURE — 93010 ELECTROCARDIOGRAM REPORT: CPT | Performed by: INTERNAL MEDICINE

## 2018-11-19 PROCEDURE — 99214 OFFICE O/P EST MOD 30 MIN: CPT | Performed by: NURSE PRACTITIONER

## 2018-11-19 PROCEDURE — 71046 X-RAY EXAM CHEST 2 VIEWS: CPT

## 2018-11-19 PROCEDURE — 85576 BLOOD PLATELET AGGREGATION: CPT | Performed by: NURSE PRACTITIONER

## 2018-11-19 RX ORDER — CALCIUM POLYCARBOPHIL 625 MG 625 MG/1
1250 TABLET ORAL DAILY
COMMUNITY
End: 2021-02-09

## 2018-11-19 NOTE — DISCHARGE INSTRUCTIONS
Take the following medications the morning of surgery with a small sip of water: AMLODIPINE, PANTOPRAZOLE        General Instructions:  • Do not eat or drink anything after midnight the night before surgery.  • Bring any papers given to you in the doctor’s office.  • Wear clean comfortable clothes and socks.  • Do not wear contact lenses or make-up.  Bring a case for your glasses.   • Remove all piercings.  Leave jewelry and any other valuables at home.  • The Pre-Admission Testing nurse will instruct you to bring medications if unable to obtain an accurate list in Pre-Admission Testing.            Preventing a Surgical Site Infection:  • For 2 to 3 days before surgery, avoid shaving with a razor because the razor can irritate skin and make it easier to develop an infection.    • Any areas of open skin can increase the risk of a post-operative wound infection by allowing bacteria to enter and travel throughout the body.  Notify your surgeon if you have any skin wounds / rashes even if it is not near the expected surgical site.  The area will need assessed to determine if surgery should be delayed until it is healed.  • The night prior to surgery sleep in a clean bed with clean clothing.  Do not allow pets to sleep with you.  • Shower on the morning of surgery using a fresh bar of anti-bacterial soap (such as Dial) and clean washcloth.  Dry with a clean towel and dress in clean clothing.  • Ask your surgeon if you will be receiving antibiotics prior to surgery.  • Make sure you, your family, and all healthcare providers clean their hands with soap and water or an alcohol based hand  before caring for you or your wound.    Day of surgery: 11/26/2018. MAIN OR. ARRIVAL TIME 11 AM  Upon arrival, a Pre-op nurse and Anesthesiologist will review your health history, obtain vital signs, and answer questions you may have.  The only belongings needed at this time will be your home medications and if applicable your  C-PAP/BI-PAP machine.  If you are staying overnight your family can leave the rest of your belongings in the car and bring them to your room later.  A Pre-op nurse will start an IV and you may receive medication in preparation for surgery, including something to help you relax.  Your family will be able to see you in the Pre-op area.  While you are in surgery your family should notify the waiting room  if they leave the waiting room area and provide a contact phone number.    Please be aware that surgery does come with discomfort.  We want to make every effort to control your discomfort so please discuss any uncontrolled symptoms with your nurse.   Your doctor will most likely have prescribed pain medications.      If you are going home after surgery you will receive individualized written care instructions before being discharged.  A responsible adult must drive you to and from the hospital on the day of your surgery and stay with you for 24 hours.    If you are staying overnight following surgery, you will be transported to your hospital room following the recovery period.  [facility] has all private rooms.    You have received a list of surgical assistants for your reference.  If you have any questions please call Pre-Admission Testing at [phone].  Deductibles and co-payments are collected on the day of service. Please be prepared to pay the required co-pay, deductible or deposit on the day of service as defined by your plan.

## 2018-11-19 NOTE — PROGRESS NOTES
"Subjective   Patient ID: Meme Morales is a 73 y.o. female is here today for H&P  emb w/neuroform 11/26. Patient presents accompanied by her sister, Lisy.    History of Present Illness  Patient presents for follow-up of incidental cerebral aneurysm during work up secondary to fall.  She has a left periophthalmic artery region aneurysm with planning to undergoing angiogram and embolization on November 26, 2018. No headaches. She has chronic \"ghost\"  floaters in both eyes but no new vision issues. No major medical changes. She has history of SSS with pacer. She follows with Dr. Xochilt Conner; last seen in July.     The following portions of the patient's history were reviewed and updated as appropriate: allergies, current medications, past family history, past medical history, past social history, past surgical history and problem list.    Review of Systems   Constitutional: Negative for chills and fever.   Eyes: Positive for visual disturbance (chronic floaters).   Respiratory: Negative for cough and shortness of breath.    Cardiovascular: Positive for palpitations (unchanged). Negative for chest pain.   Gastrointestinal: Negative for nausea and vomiting.   Musculoskeletal: Negative for gait problem and neck pain.   Skin: Negative for rash and wound.   Neurological: Negative for dizziness, weakness and headaches.   Hematological: Bruises/bleeds easily (hands and arms bruise easily).   Psychiatric/Behavioral: Positive for sleep disturbance (d/t ill ). The patient is not nervous/anxious.        Objective    Results from last 7 days   Lab Units  11/19/18   1241   WBC 10*3/mm3  6.48   HEMOGLOBIN g/dL  12.4   HEMATOCRIT %  38.5   PLATELETS 10*3/mm3  248     Results from last 7 days   Lab Units  11/19/18   1241   SODIUM mmol/L  140   POTASSIUM mmol/L  4.0   CHLORIDE mmol/L  99   CO2 mmol/L  28.9   BUN mg/dL  26*   CREATININE mg/dL  1.09*   CALCIUM mg/dL  10.2   GLUCOSE mg/dL  118*     P2Y12- 117  Platelet " assay- 391    EKG- IVCD, consider atypical RBBB; atrial paced rhythm     Physical Exam   Constitutional: She is oriented to person, place, and time. She appears well-developed and well-nourished.   Body mass index is 26.63 kg/m².     Neck: Neck supple. Normal carotid pulses present. Carotid bruit is not present.   Cardiovascular: Normal rate, regular rhythm and normal heart sounds.   No murmur heard.  Pulmonary/Chest: Effort normal and breath sounds normal.   Abdominal: Soft. Bowel sounds are normal.   Neurological: She is alert and oriented to person, place, and time. She has normal strength. She has a normal Romberg Test and a normal Tandem Gait Test. Gait normal.   Skin: Skin is warm and dry.   Psychiatric: She has a normal mood and affect. Her speech is normal and behavior is normal. Judgment normal.   Vitals reviewed.    Neurologic Exam     Mental Status   Oriented to person, place, and time.   Follows 3 step commands.   Attention: normal. Concentration: normal.   Speech: speech is normal   Level of consciousness: alert  Knowledge: good.   Normal comprehension.     Cranial Nerves   Cranial nerves II through XII intact.     Motor Exam   Right arm pronator drift: absent  Left arm pronator drift: absent    Strength   Strength 5/5 throughout.     Gait, Coordination, and Reflexes     Gait  Gait: normal    Coordination   Romberg: negative  Tandem walking coordination: normal      Assessment/Plan   Independent Review of Radiographic Studies:    CXR- no acute pulmonary process; tortuous aorta    Medical Decision Making:    Patient presents for follow-up prior to undergoing cerebral angiogram and embolization of left periophthalmic artery region aneurysm.  She denies any particular problems leading up to today's visit.  She completed preadmission testing today.  EKG is abnormal as noted above.  She has a history of sick sinus syndrome and valve regurgitation.  She is established with cardiologist.  We will request  clearance.  I explained that this may postpone her procedure secondary to being a week away, but it is important to do.  Her exam is otherwise without red flags.  Rbas of surgery discussed in detail the last office visit with Dr. Key.  She is to remain on her Plavix and aspirin.  I reviewed her medications today.  I reviewed postoperative expectations and restrictions.  We will see her back following procedure.  She will call with any questions or concerns.    Plan: Cerebral angiogram and embolization with possible Neuroform stent.  Return to office following.    Meme was seen today for pre-op exam.    Diagnoses and all orders for this visit:    Cerebral aneurysm without rupture    Sick sinus syndrome due to SA node dysfunction (CMS/[facility])  -     Ambulatory Referral to Cardiology    Leaky heart valve  -     Ambulatory Referral to Cardiology      Return for Postop vist as scheduled.

## 2018-11-19 NOTE — H&P (VIEW-ONLY)
"Subjective   Patient ID: Meem Morales is a 73 y.o. female is here today for H&P  emb w/neuroform 11/26. Patient presents accompanied by her sister, Lisy.    History of Present Illness  Patient presents for follow-up of incidental cerebral aneurysm during work up secondary to fall.  She has a left periophthalmic artery region aneurysm with planning to undergoing angiogram and embolization on November 26, 2018. No headaches. She has chronic \"ghost\"  floaters in both eyes but no new vision issues. No major medical changes. She has history of SSS with pacer. She follows with Dr. Xochilt Conner; last seen in July.     The following portions of the patient's history were reviewed and updated as appropriate: allergies, current medications, past family history, past medical history, past social history, past surgical history and problem list.    Review of Systems   Constitutional: Negative for chills and fever.   Eyes: Positive for visual disturbance (chronic floaters).   Respiratory: Negative for cough and shortness of breath.    Cardiovascular: Positive for palpitations (unchanged). Negative for chest pain.   Gastrointestinal: Negative for nausea and vomiting.   Musculoskeletal: Negative for gait problem and neck pain.   Skin: Negative for rash and wound.   Neurological: Negative for dizziness, weakness and headaches.   Hematological: Bruises/bleeds easily (hands and arms bruise easily).   Psychiatric/Behavioral: Positive for sleep disturbance (d/t ill ). The patient is not nervous/anxious.        Objective    Results from last 7 days   Lab Units  11/19/18   1241   WBC 10*3/mm3  6.48   HEMOGLOBIN g/dL  12.4   HEMATOCRIT %  38.5   PLATELETS 10*3/mm3  248     Results from last 7 days   Lab Units  11/19/18   1241   SODIUM mmol/L  140   POTASSIUM mmol/L  4.0   CHLORIDE mmol/L  99   CO2 mmol/L  28.9   BUN mg/dL  26*   CREATININE mg/dL  1.09*   CALCIUM mg/dL  10.2   GLUCOSE mg/dL  118*     P2Y12- 117  Platelet " assay- 391    EKG- IVCD, consider atypical RBBB; atrial paced rhythm     Physical Exam   Constitutional: She is oriented to person, place, and time. She appears well-developed and well-nourished.   Body mass index is 26.63 kg/m².     Neck: Neck supple. Normal carotid pulses present. Carotid bruit is not present.   Cardiovascular: Normal rate, regular rhythm and normal heart sounds.   No murmur heard.  Pulmonary/Chest: Effort normal and breath sounds normal.   Abdominal: Soft. Bowel sounds are normal.   Neurological: She is alert and oriented to person, place, and time. She has normal strength. She has a normal Romberg Test and a normal Tandem Gait Test. Gait normal.   Skin: Skin is warm and dry.   Psychiatric: She has a normal mood and affect. Her speech is normal and behavior is normal. Judgment normal.   Vitals reviewed.    Neurologic Exam     Mental Status   Oriented to person, place, and time.   Follows 3 step commands.   Attention: normal. Concentration: normal.   Speech: speech is normal   Level of consciousness: alert  Knowledge: good.   Normal comprehension.     Cranial Nerves   Cranial nerves II through XII intact.     Motor Exam   Right arm pronator drift: absent  Left arm pronator drift: absent    Strength   Strength 5/5 throughout.     Gait, Coordination, and Reflexes     Gait  Gait: normal    Coordination   Romberg: negative  Tandem walking coordination: normal      Assessment/Plan   Independent Review of Radiographic Studies:    CXR- no acute pulmonary process; tortuous aorta    Medical Decision Making:    Patient presents for follow-up prior to undergoing cerebral angiogram and embolization of left periophthalmic artery region aneurysm.  She denies any particular problems leading up to today's visit.  She completed preadmission testing today.  EKG is abnormal as noted above.  She has a history of sick sinus syndrome and valve regurgitation.  She is established with cardiologist.  We will request  clearance.  I explained that this may postpone her procedure secondary to being a week away, but it is important to do.  Her exam is otherwise without red flags.  Rbas of surgery discussed in detail the last office visit with Dr. Key.  She is to remain on her Plavix and aspirin.  I reviewed her medications today.  I reviewed postoperative expectations and restrictions.  We will see her back following procedure.  She will call with any questions or concerns.    Plan: Cerebral angiogram and embolization with possible Neuroform stent.  Return to office following.    Meme was seen today for pre-op exam.    Diagnoses and all orders for this visit:    Cerebral aneurysm without rupture    Sick sinus syndrome due to SA node dysfunction (CMS/[facility])  -     Ambulatory Referral to Cardiology    Leaky heart valve  -     Ambulatory Referral to Cardiology      Return for Postop vist as scheduled.

## 2018-11-26 ENCOUNTER — APPOINTMENT (OUTPATIENT)
Dept: GENERAL RADIOLOGY | Facility: HOSPITAL | Age: 73
End: 2018-11-26

## 2018-11-26 ENCOUNTER — ANESTHESIA (OUTPATIENT)
Dept: PERIOP | Facility: HOSPITAL | Age: 73
End: 2018-11-26

## 2018-11-26 ENCOUNTER — ANESTHESIA EVENT (OUTPATIENT)
Dept: PERIOP | Facility: HOSPITAL | Age: 73
End: 2018-11-26

## 2018-11-26 ENCOUNTER — HOSPITAL ENCOUNTER (INPATIENT)
Facility: HOSPITAL | Age: 73
LOS: 1 days | Discharge: HOME | End: 2018-11-27
Attending: NEUROLOGICAL SURGERY | Admitting: NEUROLOGICAL SURGERY

## 2018-11-26 DIAGNOSIS — I67.1 CEREBRAL ANEURYSM WITHOUT RUPTURE: ICD-10-CM

## 2018-11-26 LAB — GLUCOSE BLDC GLUCOMTR-MCNC: 142 MG/DL (ref 70–130)

## 2018-11-26 PROCEDURE — 25010000002 HEPARIN (PORCINE) PER 1000 UNITS: Performed by: NEUROLOGICAL SURGERY

## 2018-11-26 PROCEDURE — 75898 FOLLOW-UP ANGIOGRAPHY: CPT | Performed by: NEUROLOGICAL SURGERY

## 2018-11-26 PROCEDURE — 61624 TCAT PERM OCCLS/EMBOLJ CNS: CPT | Performed by: NEUROLOGICAL SURGERY

## 2018-11-26 PROCEDURE — 25010000002 HEPARIN (PORCINE) PER 1000 UNITS: Performed by: NURSE ANESTHETIST, CERTIFIED REGISTERED

## 2018-11-26 PROCEDURE — C1894 INTRO/SHEATH, NON-LASER: HCPCS | Performed by: NEUROLOGICAL SURGERY

## 2018-11-26 PROCEDURE — 75898 FOLLOW-UP ANGIOGRAPHY: CPT

## 2018-11-26 PROCEDURE — 25010000003 CEFAZOLIN IN DEXTROSE 2-4 GM/100ML-% SOLUTION: Performed by: NEUROLOGICAL SURGERY

## 2018-11-26 PROCEDURE — 0 IOVERSOL 68 % SOLUTION: Performed by: NEUROLOGICAL SURGERY

## 2018-11-26 PROCEDURE — 25010000002 DEXAMETHASONE PER 1 MG: Performed by: NURSE ANESTHETIST, CERTIFIED REGISTERED

## 2018-11-26 PROCEDURE — C1769 GUIDE WIRE: HCPCS | Performed by: NEUROLOGICAL SURGERY

## 2018-11-26 PROCEDURE — 03VG3DZ RESTRICTION OF INTRACRANIAL ARTERY WITH INTRALUMINAL DEVICE, PERCUTANEOUS APPROACH: ICD-10-PCS | Performed by: NEUROLOGICAL SURGERY

## 2018-11-26 PROCEDURE — 25810000003 SODIUM CHLORIDE 0.9 % WITH KCL 20 MEQ 20-0.9 MEQ/L-% SOLUTION: Performed by: NEUROLOGICAL SURGERY

## 2018-11-26 PROCEDURE — B3171ZZ FLUOROSCOPY OF LEFT INTERNAL CAROTID ARTERY USING LOW OSMOLAR CONTRAST: ICD-10-PCS | Performed by: NEUROLOGICAL SURGERY

## 2018-11-26 PROCEDURE — 75894 X-RAYS TRANSCATH THERAPY: CPT | Performed by: NEUROLOGICAL SURGERY

## 2018-11-26 PROCEDURE — 25010000002 PROTAMINE SULFATE PER 10 MG: Performed by: ANESTHESIOLOGY

## 2018-11-26 PROCEDURE — C1887 CATHETER, GUIDING: HCPCS | Performed by: NEUROLOGICAL SURGERY

## 2018-11-26 PROCEDURE — 75894 X-RAYS TRANSCATH THERAPY: CPT

## 2018-11-26 PROCEDURE — C1760 CLOSURE DEV, VASC: HCPCS | Performed by: NEUROLOGICAL SURGERY

## 2018-11-26 PROCEDURE — 76377 3D RENDER W/INTRP POSTPROCES: CPT

## 2018-11-26 PROCEDURE — 85347 COAGULATION TIME ACTIVATED: CPT

## 2018-11-26 PROCEDURE — 25010000002 PHENYLEPHRINE PER 1 ML: Performed by: NURSE ANESTHETIST, CERTIFIED REGISTERED

## 2018-11-26 PROCEDURE — 82962 GLUCOSE BLOOD TEST: CPT

## 2018-11-26 PROCEDURE — 76377 3D RENDER W/INTRP POSTPROCES: CPT | Performed by: NEUROLOGICAL SURGERY

## 2018-11-26 PROCEDURE — 25010000002 ONDANSETRON PER 1 MG: Performed by: NURSE ANESTHETIST, CERTIFIED REGISTERED

## 2018-11-26 PROCEDURE — 25010000002 FENTANYL CITRATE (PF) 100 MCG/2ML SOLUTION: Performed by: ANESTHESIOLOGY

## 2018-11-26 PROCEDURE — 25010000002 PROPOFOL 10 MG/ML EMULSION: Performed by: NURSE ANESTHETIST, CERTIFIED REGISTERED

## 2018-11-26 PROCEDURE — 36224 PLACE CATH CAROTD ART: CPT | Performed by: NEUROLOGICAL SURGERY

## 2018-11-26 DEVICE — 360 SOFT DETACHABLE COIL
Type: IMPLANTABLE DEVICE | Status: FUNCTIONAL
Brand: TARGET XL

## 2018-11-26 DEVICE — 360 NANO DETACHABLE COIL
Type: IMPLANTABLE DEVICE | Status: FUNCTIONAL
Brand: TARGET

## 2018-11-26 DEVICE — 360 ULTRA DETACHABLE COIL
Type: IMPLANTABLE DEVICE | Status: FUNCTIONAL
Brand: TARGET

## 2018-11-26 DEVICE — WITHOUT TIP STENT SYSTEM
Type: IMPLANTABLE DEVICE | Status: FUNCTIONAL
Brand: NEUROFORM ATLAS

## 2018-11-26 RX ORDER — MIDAZOLAM HYDROCHLORIDE 1 MG/ML
1 INJECTION INTRAMUSCULAR; INTRAVENOUS
Status: DISCONTINUED | OUTPATIENT
Start: 2018-11-26 | End: 2018-11-26 | Stop reason: HOSPADM

## 2018-11-26 RX ORDER — FUROSEMIDE 40 MG/1
40 TABLET ORAL DAILY PRN
Status: DISCONTINUED | OUTPATIENT
Start: 2018-11-26 | End: 2018-11-27 | Stop reason: HOSPADM

## 2018-11-26 RX ORDER — DEXAMETHASONE SODIUM PHOSPHATE 10 MG/ML
INJECTION INTRAMUSCULAR; INTRAVENOUS AS NEEDED
Status: DISCONTINUED | OUTPATIENT
Start: 2018-11-26 | End: 2018-11-26 | Stop reason: SURG

## 2018-11-26 RX ORDER — MONTELUKAST SODIUM 10 MG/1
10 TABLET ORAL DAILY
Status: DISCONTINUED | OUTPATIENT
Start: 2018-11-27 | End: 2018-11-27 | Stop reason: HOSPADM

## 2018-11-26 RX ORDER — LEVOTHYROXINE SODIUM 0.07 MG/1
75 TABLET ORAL
Status: DISCONTINUED | OUTPATIENT
Start: 2018-11-27 | End: 2018-11-27 | Stop reason: HOSPADM

## 2018-11-26 RX ORDER — ATORVASTATIN CALCIUM 20 MG/1
20 TABLET, FILM COATED ORAL DAILY
Status: DISCONTINUED | OUTPATIENT
Start: 2018-11-27 | End: 2018-11-27 | Stop reason: HOSPADM

## 2018-11-26 RX ORDER — PROMETHAZINE HYDROCHLORIDE 25 MG/1
25 TABLET ORAL ONCE AS NEEDED
Status: DISCONTINUED | OUTPATIENT
Start: 2018-11-26 | End: 2018-11-26 | Stop reason: HOSPADM

## 2018-11-26 RX ORDER — FAMOTIDINE 10 MG/ML
20 INJECTION, SOLUTION INTRAVENOUS ONCE
Status: COMPLETED | OUTPATIENT
Start: 2018-11-26 | End: 2018-11-26

## 2018-11-26 RX ORDER — SODIUM CHLORIDE 0.9 % (FLUSH) 0.9 %
1-10 SYRINGE (ML) INJECTION AS NEEDED
Status: DISCONTINUED | OUTPATIENT
Start: 2018-11-26 | End: 2018-11-26 | Stop reason: HOSPADM

## 2018-11-26 RX ORDER — HEPARIN SODIUM 1000 [USP'U]/ML
INJECTION, SOLUTION INTRAVENOUS; SUBCUTANEOUS AS NEEDED
Status: DISCONTINUED | OUTPATIENT
Start: 2018-11-26 | End: 2018-11-26 | Stop reason: SURG

## 2018-11-26 RX ORDER — ROCURONIUM BROMIDE 10 MG/ML
INJECTION, SOLUTION INTRAVENOUS AS NEEDED
Status: DISCONTINUED | OUTPATIENT
Start: 2018-11-26 | End: 2018-11-26 | Stop reason: SURG

## 2018-11-26 RX ORDER — PROPOFOL 10 MG/ML
VIAL (ML) INTRAVENOUS AS NEEDED
Status: DISCONTINUED | OUTPATIENT
Start: 2018-11-26 | End: 2018-11-26 | Stop reason: SURG

## 2018-11-26 RX ORDER — FENTANYL CITRATE 50 UG/ML
50 INJECTION, SOLUTION INTRAMUSCULAR; INTRAVENOUS
Status: DISCONTINUED | OUTPATIENT
Start: 2018-11-26 | End: 2018-11-26 | Stop reason: HOSPADM

## 2018-11-26 RX ORDER — LIDOCAINE HYDROCHLORIDE 20 MG/ML
INJECTION, SOLUTION INFILTRATION; PERINEURAL AS NEEDED
Status: DISCONTINUED | OUTPATIENT
Start: 2018-11-26 | End: 2018-11-26 | Stop reason: SURG

## 2018-11-26 RX ORDER — PROTAMINE SULFATE 10 MG/ML
INJECTION, SOLUTION INTRAVENOUS AS NEEDED
Status: DISCONTINUED | OUTPATIENT
Start: 2018-11-26 | End: 2018-11-26 | Stop reason: SURG

## 2018-11-26 RX ORDER — ROPINIROLE 1 MG/1
1 TABLET, FILM COATED ORAL 2 TIMES DAILY
Status: DISCONTINUED | OUTPATIENT
Start: 2018-11-26 | End: 2018-11-27 | Stop reason: HOSPADM

## 2018-11-26 RX ORDER — PROMETHAZINE HYDROCHLORIDE 25 MG/ML
12.5 INJECTION, SOLUTION INTRAMUSCULAR; INTRAVENOUS ONCE AS NEEDED
Status: DISCONTINUED | OUTPATIENT
Start: 2018-11-26 | End: 2018-11-26 | Stop reason: HOSPADM

## 2018-11-26 RX ORDER — NALOXONE HCL 0.4 MG/ML
0.2 VIAL (ML) INJECTION AS NEEDED
Status: DISCONTINUED | OUTPATIENT
Start: 2018-11-26 | End: 2018-11-26 | Stop reason: HOSPADM

## 2018-11-26 RX ORDER — FLUMAZENIL 0.1 MG/ML
0.2 INJECTION INTRAVENOUS AS NEEDED
Status: DISCONTINUED | OUTPATIENT
Start: 2018-11-26 | End: 2018-11-26 | Stop reason: HOSPADM

## 2018-11-26 RX ORDER — CLOPIDOGREL BISULFATE 75 MG/1
75 TABLET ORAL DAILY
Status: DISCONTINUED | OUTPATIENT
Start: 2018-11-27 | End: 2018-11-27 | Stop reason: HOSPADM

## 2018-11-26 RX ORDER — LABETALOL HYDROCHLORIDE 5 MG/ML
5 INJECTION, SOLUTION INTRAVENOUS
Status: DISCONTINUED | OUTPATIENT
Start: 2018-11-26 | End: 2018-11-26 | Stop reason: HOSPADM

## 2018-11-26 RX ORDER — LIDOCAINE HYDROCHLORIDE 10 MG/ML
0.5 INJECTION, SOLUTION EPIDURAL; INFILTRATION; INTRACAUDAL; PERINEURAL ONCE AS NEEDED
Status: DISCONTINUED | OUTPATIENT
Start: 2018-11-26 | End: 2018-11-26 | Stop reason: HOSPADM

## 2018-11-26 RX ORDER — SODIUM CHLORIDE 9 MG/ML
100 INJECTION, SOLUTION INTRAVENOUS CONTINUOUS
Status: DISCONTINUED | OUTPATIENT
Start: 2018-11-26 | End: 2018-11-27 | Stop reason: HOSPADM

## 2018-11-26 RX ORDER — ONDANSETRON 2 MG/ML
4 INJECTION INTRAMUSCULAR; INTRAVENOUS ONCE AS NEEDED
Status: COMPLETED | OUTPATIENT
Start: 2018-11-26 | End: 2018-11-26

## 2018-11-26 RX ORDER — PROMETHAZINE HYDROCHLORIDE 25 MG/1
12.5 TABLET ORAL ONCE AS NEEDED
Status: DISCONTINUED | OUTPATIENT
Start: 2018-11-26 | End: 2018-11-26 | Stop reason: HOSPADM

## 2018-11-26 RX ORDER — EPHEDRINE SULFATE 50 MG/ML
5 INJECTION, SOLUTION INTRAVENOUS ONCE AS NEEDED
Status: COMPLETED | OUTPATIENT
Start: 2018-11-26 | End: 2018-11-26

## 2018-11-26 RX ORDER — SODIUM CHLORIDE, SODIUM LACTATE, POTASSIUM CHLORIDE, CALCIUM CHLORIDE 600; 310; 30; 20 MG/100ML; MG/100ML; MG/100ML; MG/100ML
9 INJECTION, SOLUTION INTRAVENOUS CONTINUOUS
Status: DISCONTINUED | OUTPATIENT
Start: 2018-11-26 | End: 2018-11-26

## 2018-11-26 RX ORDER — AMLODIPINE BESYLATE 5 MG/1
5 TABLET ORAL DAILY
Status: DISCONTINUED | OUTPATIENT
Start: 2018-11-27 | End: 2018-11-27 | Stop reason: HOSPADM

## 2018-11-26 RX ORDER — PROMETHAZINE HYDROCHLORIDE 25 MG/1
25 SUPPOSITORY RECTAL ONCE AS NEEDED
Status: DISCONTINUED | OUTPATIENT
Start: 2018-11-26 | End: 2018-11-26 | Stop reason: HOSPADM

## 2018-11-26 RX ORDER — CEFAZOLIN SODIUM 2 G/100ML
2 INJECTION, SOLUTION INTRAVENOUS ONCE
Status: COMPLETED | OUTPATIENT
Start: 2018-11-26 | End: 2018-11-26

## 2018-11-26 RX ORDER — SODIUM CHLORIDE AND POTASSIUM CHLORIDE 150; 900 MG/100ML; MG/100ML
60 INJECTION, SOLUTION INTRAVENOUS CONTINUOUS
Status: DISCONTINUED | OUTPATIENT
Start: 2018-11-26 | End: 2018-11-27 | Stop reason: HOSPADM

## 2018-11-26 RX ORDER — ASPIRIN 81 MG/1
81 TABLET ORAL DAILY
Status: DISCONTINUED | OUTPATIENT
Start: 2018-11-27 | End: 2018-11-27

## 2018-11-26 RX ADMIN — EPHEDRINE SULFATE 10 MG: 50 INJECTION INTRAMUSCULAR; INTRAVENOUS; SUBCUTANEOUS at 16:22

## 2018-11-26 RX ADMIN — PROTAMINE SULFATE 40 MG: 10 INJECTION, SOLUTION INTRAVENOUS at 16:34

## 2018-11-26 RX ADMIN — SODIUM CHLORIDE 100 ML/HR: 9 INJECTION, SOLUTION INTRAVENOUS at 13:02

## 2018-11-26 RX ADMIN — PHENYLEPHRINE HYDROCHLORIDE 100 MCG: 10 INJECTION INTRAVENOUS at 15:06

## 2018-11-26 RX ADMIN — PHENYLEPHRINE HYDROCHLORIDE 100 MCG: 10 INJECTION INTRAVENOUS at 16:48

## 2018-11-26 RX ADMIN — HEPARIN SODIUM 7000 UNITS: 1000 INJECTION, SOLUTION INTRAVENOUS; SUBCUTANEOUS at 15:22

## 2018-11-26 RX ADMIN — ROCURONIUM BROMIDE 10 MG: 10 INJECTION INTRAVENOUS at 16:11

## 2018-11-26 RX ADMIN — FAMOTIDINE 20 MG: 10 INJECTION INTRAVENOUS at 13:02

## 2018-11-26 RX ADMIN — ROPINIROLE 1 MG: 1 TABLET, FILM COATED ORAL at 22:30

## 2018-11-26 RX ADMIN — ONDANSETRON HYDROCHLORIDE 4 MG: 2 SOLUTION INTRAMUSCULAR; INTRAVENOUS at 16:44

## 2018-11-26 RX ADMIN — POTASSIUM CHLORIDE AND SODIUM CHLORIDE 60 ML/HR: 900; 150 INJECTION, SOLUTION INTRAVENOUS at 21:30

## 2018-11-26 RX ADMIN — FENTANYL CITRATE 25 MCG: 50 INJECTION INTRAMUSCULAR; INTRAVENOUS at 14:30

## 2018-11-26 RX ADMIN — PHENYLEPHRINE HYDROCHLORIDE 100 MCG: 10 INJECTION INTRAVENOUS at 16:28

## 2018-11-26 RX ADMIN — SUGAMMADEX 400 MG: 100 INJECTION, SOLUTION INTRAVENOUS at 16:44

## 2018-11-26 RX ADMIN — PHENYLEPHRINE HYDROCHLORIDE 100 MCG: 10 INJECTION INTRAVENOUS at 14:44

## 2018-11-26 RX ADMIN — EPHEDRINE SULFATE 10 MG: 50 INJECTION INTRAMUSCULAR; INTRAVENOUS; SUBCUTANEOUS at 16:12

## 2018-11-26 RX ADMIN — ROCURONIUM BROMIDE 20 MG: 10 INJECTION INTRAVENOUS at 15:08

## 2018-11-26 RX ADMIN — LIDOCAINE HYDROCHLORIDE 100 MG: 20 INJECTION, SOLUTION INFILTRATION; PERINEURAL at 14:30

## 2018-11-26 RX ADMIN — CEFAZOLIN SODIUM 2 G: 2 INJECTION, SOLUTION INTRAVENOUS at 14:18

## 2018-11-26 RX ADMIN — PROPOFOL 120 MG: 10 INJECTION, EMULSION INTRAVENOUS at 14:30

## 2018-11-26 RX ADMIN — ROCURONIUM BROMIDE 30 MG: 10 INJECTION INTRAVENOUS at 14:30

## 2018-11-26 RX ADMIN — EPHEDRINE SULFATE 10 MG: 50 INJECTION INTRAMUSCULAR; INTRAVENOUS; SUBCUTANEOUS at 16:27

## 2018-11-26 RX ADMIN — DEXAMETHASONE SODIUM PHOSPHATE 6 MG: 10 INJECTION INTRAMUSCULAR; INTRAVENOUS at 15:00

## 2018-11-26 RX ADMIN — IOVERSOL 176 ML: 678 INJECTION INTRA-ARTERIAL; INTRAVENOUS at 17:14

## 2018-11-26 RX ADMIN — SODIUM CHLORIDE: 9 INJECTION, SOLUTION INTRAVENOUS at 15:43

## 2018-11-26 RX ADMIN — ROCURONIUM BROMIDE 10 MG: 10 INJECTION INTRAVENOUS at 15:43

## 2018-11-26 NOTE — ANESTHESIA PREPROCEDURE EVALUATION
Anesthesia Evaluation     history of anesthetic complications:  NPO Solid Status: > 8 hours             Airway   Mallampati: II  TM distance: >3 FB  Neck ROM: full  No difficulty expected  Dental - normal exam     Pulmonary - normal exam   Cardiovascular - normal exam    (+) pacemaker, hypertension, valvular problems/murmurs MR, CHF, hyperlipidemia,       Neuro/Psych    ROS Comment: Pt fell  Went to hospital and had CT with contrast and this aneurysm was found  GI/Hepatic/Renal/Endo    (+)  GERD,      Musculoskeletal     Abdominal    Substance History      OB/GYN          Other   (+) arthritis                     Anesthesia Plan    ASA 3     general     intravenous induction   Anesthetic plan, all risks, benefits, and alternatives have been provided, discussed and informed consent has been obtained with: patient.

## 2018-11-26 NOTE — BRIEF OP NOTE
EMBOLIZATION CEREBRAL  Progress Note    Meme Morales  11/26/2018    Pre-op Diagnosis:   Cerebral aneurysm without rupture [I67.1]       Post-Op Diagnosis Codes:     * Cerebral aneurysm without rupture [I67.1]    Procedure/CPT® Codes:      Procedure(s):  Cerebral angiography and embolization of cerebral aneurysm with possible Neuroform stent    Surgeon(s):  Ranjit Key MD    Anesthesia: General    Staff:   Circulator: Irma Lehman RN  Scrub Person: Vinny Hilton Rebecca  Assistant: Joanna Westbrook CSA  Vascular Radiology Technician: Sharmaine Muñiz Kevin    Estimated Blood Loss: minimal    Urine Voided: 375 mL    Specimens:                None      Drains:   Urethral Catheter Silicone 16 Fr. (Active)       Findings: 9.5 mm superior ophthalmic artery aneurysm with wide neck- Satisfactory obliteration fo the aneurysm achieved    Complications: none      Ranjit Key MD     Date: 11/26/2018  Time: 4:46 PM

## 2018-11-26 NOTE — ANESTHESIA PROCEDURE NOTES
ANESTHESIA INTUBATION  Urgency: elective    Date/Time: 11/26/2018 2:32 PM  Airway not difficult    General Information and Staff    Patient location during procedure: OR  Anesthesiologist: Kameron Campos MD  CRNA: Krunal Manzanares CRNA    Indications and Patient Condition  Indications for airway management: airway protection    Preoxygenated: yes  MILS not maintained throughout  Mask difficulty assessment: 1 - vent by mask    Final Airway Details  Final airway type: endotracheal airway      Successful airway: ETT  Cuffed: yes   Successful intubation technique: direct laryngoscopy  Endotracheal tube insertion site: oral  Blade: Kathya  Blade size: 3  ETT size (mm): 7.0  Cormack-Lehane Classification: grade I - full view of glottis  Placement verified by: chest auscultation   Cuff volume (mL): 6  Measured from: lips  ETT to lips (cm): 20  Number of attempts at approach: 1    Additional Comments  Pre O2, SIAI

## 2018-11-26 NOTE — ANESTHESIA POSTPROCEDURE EVALUATION
Patient: Meme Morales    Procedure Summary     Date:  11/26/18 Room / Location:  [facility]    Anesthesia Start:  1417 Anesthesia Stop:  1706    Procedure:  Cerebral angiography and embolization of cerebral aneurysm with possible Neuroform stent (N/A ) Diagnosis:       Cerebral aneurysm without rupture      (Cerebral aneurysm without rupture [I67.1])    Surgeon:  Ranjit Key MD Provider:  Kameron Campos MD    Anesthesia Type:  general ASA Status:  3          Anesthesia Type: general  Last vitals  BP   130/64 (11/26/18 1845)   Temp   36.4 °C (97.5 °F) (11/26/18 1702)   Pulse   68 (11/26/18 1845)   Resp   16 (11/26/18 1845)     SpO2   98 % (11/26/18 1845)     Post Anesthesia Care and Evaluation    Patient location during evaluation: bedside  Pain management: adequate  Airway patency: patent  Anesthetic complications: No anesthetic complications    Cardiovascular status: acceptable  Respiratory status: acceptable  Hydration status: acceptable

## 2018-11-27 VITALS
HEART RATE: 60 BPM | RESPIRATION RATE: 16 BRPM | SYSTOLIC BLOOD PRESSURE: 121 MMHG | HEIGHT: 67 IN | OXYGEN SATURATION: 98 % | DIASTOLIC BLOOD PRESSURE: 62 MMHG | BODY MASS INDEX: 27.99 KG/M2 | WEIGHT: 178.35 LBS | TEMPERATURE: 97.8 F

## 2018-11-27 LAB
ACT BLD: 120 SECONDS (ref 82–152)
ACT BLD: 125 SECONDS (ref 82–152)
ACT BLD: 235 SECONDS (ref 82–152)
ACT BLD: 252 SECONDS (ref 82–152)
ASA PLATELET INHIBITION: 515 ARU
PA ADP PRP-ACNC: 201 PRU (ref 194–418)

## 2018-11-27 PROCEDURE — 99238 HOSP IP/OBS DSCHRG MGMT 30/<: CPT | Performed by: NEUROLOGICAL SURGERY

## 2018-11-27 PROCEDURE — 85576 BLOOD PLATELET AGGREGATION: CPT | Performed by: NEUROLOGICAL SURGERY

## 2018-11-27 RX ORDER — CLOPIDOGREL BISULFATE 75 MG/1
300 TABLET ORAL ONCE
Status: COMPLETED | OUTPATIENT
Start: 2018-11-27 | End: 2018-11-27

## 2018-11-27 RX ORDER — ASPIRIN 81 MG/1
324 TABLET, CHEWABLE ORAL DAILY
Status: DISCONTINUED | OUTPATIENT
Start: 2018-11-27 | End: 2018-11-27

## 2018-11-27 RX ORDER — ACETAMINOPHEN 325 MG/1
325 TABLET ORAL DAILY
Status: DISCONTINUED | OUTPATIENT
Start: 2018-11-27 | End: 2018-11-27 | Stop reason: HOSPADM

## 2018-11-27 RX ORDER — ASPIRIN 81 MG/1
324 TABLET, CHEWABLE ORAL DAILY
Status: DISCONTINUED | OUTPATIENT
Start: 2018-11-27 | End: 2018-11-27 | Stop reason: HOSPADM

## 2018-11-27 RX ORDER — ASPIRIN 325 MG
325 TABLET, DELAYED RELEASE (ENTERIC COATED) ORAL DAILY
Qty: 30 TABLET | Refills: 6 | Status: SHIPPED | OUTPATIENT
Start: 2018-11-27

## 2018-11-27 RX ADMIN — LEVOTHYROXINE SODIUM 75 MCG: 75 TABLET ORAL at 06:51

## 2018-11-27 RX ADMIN — ROPINIROLE 1 MG: 1 TABLET, FILM COATED ORAL at 08:29

## 2018-11-27 RX ADMIN — ATORVASTATIN CALCIUM 20 MG: 20 TABLET, FILM COATED ORAL at 08:29

## 2018-11-27 RX ADMIN — ACETAMINOPHEN 325 MG: 325 TABLET, FILM COATED ORAL at 08:28

## 2018-11-27 RX ADMIN — AMLODIPINE BESYLATE 5 MG: 5 TABLET ORAL at 08:29

## 2018-11-27 RX ADMIN — MONTELUKAST SODIUM 10 MG: 10 TABLET, FILM COATED ORAL at 08:29

## 2018-11-27 RX ADMIN — Medication 324 MG: at 08:36

## 2018-11-27 RX ADMIN — CLOPIDOGREL 375 MG: 75 TABLET, FILM COATED ORAL at 08:29

## 2018-11-27 NOTE — DISCHARGE SUMMARY
Meme DOMÍNGUEZ Andrew  1945    Patient Care Team:  Renita Price MD as PCP - General (Internal Medicine)  Xochilt Conner MD as Consulting Physician (Cardiology)    Date of Admit: 11/26/2018    Date of Discharge:  11/27/2018    Discharge Diagnosis:  Cerebral aneurysm without rupture      Procedures Performed  Procedure(s):  Cerebral angiography and embolization of cerebral aneurysm with possible Neuroform stent       Consultants:   Consults     Date and Time Order Name Status Description    11/26/2018 2113 Inpatient Consult to Intensivist Completed           Condition on Discharge: stable    Discharge disposition: home    Pertinent Test Results: angiography: Postembolization arteriography demonstrated satisfactory occlusion of the periophthalmic artery aneurysm on the left side of the internal carotid artery.    Brief HPI: The patient had the incidental discovery of a large left periophthalmic artery region aneurysm.  It was indicated to obliterate the aneurysm to reduce her risk of rupture.    Hospital Course: The patient is minimally the hospital and underwent the above-noted procedure.  Postoperatively she did well.  She denies significant headache nausea vomiting numbness tingling weakness etc.  At time of discharge she is able toward, she is when he spontaneously, her pain is controlled with oral pain medication.    Discharge Physical Exam:    General Appearance No acute distress   Neck No adenopathy, supple, trachea midline, no thyromegaly, no carotid bruit, no JVD   Lungs Clear to auscultation,respirations regular, even and unlabored   Heart Regular rhythm and normal rate, normal S1 and S2, no murmur, no gallop, no rub, no click   Chest wall No abnormalities observed   Abdomen Normal bowel sounds, no masses, no hepatomegaly, soft   Extremities Moves all extremities well, no edema, no cyanosis, no redness   Neurological Alert and oriented x 3   Visual fields are intact to confrontation.  Discharge  Medications     Your medication list      CHANGE how you take these medications      Instructions Last Dose Given Next Dose Due   aspirin  MG tablet  What changed:    · medication strength  · how much to take  · additional instructions      Take 1 tablet by mouth Daily.       clopidogrel 75 MG tablet  Commonly known as:  PLAVIX  What changed:  additional instructions      Take 1 tablet by mouth Daily.          CONTINUE taking these medications      Instructions Last Dose Given Next Dose Due   acetaminophen 500 MG tablet  Commonly known as:  TYLENOL      Take 1,000 mg by mouth Every 6 (Six) Hours As Needed for Mild Pain  or Fever. Two tablets once daily       amLODIPine 5 MG tablet  Commonly known as:  NORVASC      Take 5 mg by mouth Daily.       atorvastatin 20 MG tablet  Commonly known as:  LIPITOR      Take 20 mg by mouth Daily.       azelastine 0.15 % solution nasal spray  Commonly known as:  ASTEPRO      1 spray into the nostril(s) as directed by provider 2 (Two) Times a Day.       calcium polycarbophil 625 MG tablet  Commonly known as:  FIBERCON      Take 1,250 mg by mouth Daily.       flunisolide 25 MCG/ACT (0.025%) solution nasal spray  Commonly known as:  NASALIDE      Inhale 2 sprays 2 (Two) Times a Day. 2 sprays daily       furosemide 40 MG tablet  Commonly known as:  LASIX      Take 40 mg by mouth Daily As Needed (WITH SWELLING).       levothyroxine 75 MCG tablet  Commonly known as:  SYNTHROID, LEVOTHROID      Take 75 mcg by mouth Daily.       montelukast 10 MG tablet  Commonly known as:  SINGULAIR      Take 10 mg by mouth Daily.       omeprazole 20 MG capsule  Commonly known as:  priLOSEC      Take 20 mg by mouth Daily As Needed (S/S REFLUX).       pantoprazole 40 MG EC tablet  Commonly known as:  PROTONIX      Take 40 mg by mouth Daily.       POTASSIUM CHLORIDE PO      Take 20 mEq by mouth Daily As Needed (AS NEEDED WITH FUROSEMIDE).       rOPINIRole 1 MG tablet  Commonly known as:  REQUIP       Take 1 mg by mouth 2 (Two) Times a Day. TAKES IN AFTERNOON AND AT BEDTIME       vitamin C 500 MG tablet  Commonly known as:  ASCORBIC ACID      Take 500 mg by mouth Daily.       Vitamin D3 2000 units tablet      Take 2,000 Units by mouth Daily.             Where to Get Your Medications      These medications were sent to [facility] - [address] [address]    Hours:  7:00AM-6PM Mon-Fri Phone:  [phone]   · aspirin  MG tablet         Discharge Diet:     Diet Order   Procedures   • Diet Regular       Activity at Discharge:   Activity Instructions     Discharge Activity      1) No driving until office visit  2) Return to work in7 days  3) May shower / sponge bathe  4) Do not lift / push / pull more then 8 lbs.            Call for: Patient instructed to call for fever >100.5, chills, wound swelling/drainage/redness, change in neurologic status including but not limited to wound issues    Follow-up Appointments  Future Appointments   Date Time Provider Department Center   12/10/2018  2:00 PM Aundrea Tinajero, APRN [facility] None     Additional Instructions for the Follow-ups that You Need to Schedule     Discharge Follow-up with Specified Provider: [facility]   As directed      To:  [facility]    Follow Up Details:  As arranged               Test Results Pending at Discharge       Ranjit Key MD  11/27/18  8:04 AM     30 min spent in reviewing records, discussion and examination of the patient and discussion with other members of the patient's medical team.

## 2018-11-27 NOTE — PROGRESS NOTES
Discharge Planning Assessment  [facility]     Patient Name: Meme Morales  MRN: [id number]  Today's Date: 11/27/2018    Admit Date: 11/26/2018    Discharge Needs Assessment     Row Name 11/27/18 0929       Living Environment    Lives With  spouse    Name(s) of Who Lives With Patient  Spouse    Current Living Arrangements  home/apartment/condo    Provides Primary Care For  spouse    Family Caregiver if Needed  child(carlos eduardo), adult;sibling(s)    Quality of Family Relationships  supportive    Able to Return to Prior Arrangements  yes       Resource/Environmental Concerns    Resource/Environmental Concerns  none    Transportation Concerns  car, none       Transition Planning    Patient/Family Anticipates Transition to  home with family    Patient/Family Anticipated Services at Transition  none    Transportation Anticipated  family or friend will provide       Discharge Needs Assessment    Readmission Within the Last 30 Days  no previous admission in last 30 days    Concerns to be Addressed  no discharge needs identified    Equipment Currently Used at Home  none    Equipment Needed After Discharge  none    Offered/Gave Vendor List  no        Discharge Plan     Row Name 11/27/18 0933       Plan    Plan  Home    Patient/Family in Agreement with Plan  yes    Plan Comments  IMM 11/26/2018.  Met with patient and family at bedside, patient granted me permission to speak to her while family remained in the room.  Prior to admission patient was independent with all aspects of her ADL's including caring for her .  Patient does not use any special or adaptive equipment.  Pharmacy will be meds to beds, for discharge meds. Discharge plans are to return home with family support to assist her as well as her .  Daughter will be transporting home.          Destination      No service coordination in this encounter.      Durable Medical Equipment      No service coordination in this encounter.      Dialysis/Infusion       No service coordination in this encounter.      Home Medical Care      No service coordination in this encounter.      Community Resources      No service coordination in this encounter.        Expected Discharge Date and Time     Expected Discharge Date Expected Discharge Time    Nov 27, 2018         Demographic Summary     Row Name 11/27/18 0929       General Information    Admission Type  inpatient    Arrived From  home    Referral Source  admission list    Reason for Consult  discharge planning    Preferred Language  English     Used During This Interaction  no        Functional Status     Row Name 11/27/18 0929       Functional Status    Usual Activity Tolerance  excellent    Current Activity Tolerance  good       Functional Status, IADL    Medications  independent    Meal Preparation  independent    Housekeeping  independent    Laundry  independent    Shopping  independent       Mental Status    General Appearance WDL  WDL       Mental Status Summary    Recent Changes in Mental Status/Cognitive Functioning  no changes       Employment/    Employment Status  retired        Psychosocial    No documentation.       Abuse/Neglect    No documentation.       Legal    No documentation.       Substance Abuse    No documentation.       Patient Forms    No documentation.           Rosa Berman RN

## 2018-11-27 NOTE — PROGRESS NOTES
Case Management Discharge Note    Final Note: Discharged     Destination      No service has been selected for the patient.      Durable Medical Equipment      No service has been selected for the patient.      Dialysis/Infusion      No service has been selected for the patient.      Home Medical Care      No service has been selected for the patient.      Community Resources      No service has been selected for the patient.        Other: (Private auto)    Final Discharge Disposition Code: 01 - home or self-care

## 2018-11-27 NOTE — CONSULTS
Group: [facility]         CONSULT NOTE    Patient Identification:  Meme Morales  73 y.o.  female  1945  [id number]            Requesting physician: Neurosurgery    Reason for Consultation:  ICU comanagement    CC:     History of Present Illness:  33-year-old female admitted electively for cerebral angiogram and embolization of cerebral aneurysm with stent.  Patient is status post procedure and tolerated the procedure well.  Currently resting comfortably with stable neuro status.  Denies any headache-vision or any new neuro changes.  History of CHF and restless leg syndrome.  Request medication for restless leg to be restarted.  Also underlying history of hypothyroidism and hypertension.  Currently denies any chest pain or shortness of breath.  No nausea vomiting diarrhea.  Appears to be resting comfortably.      Review of Systems  As above rest is negative  Past Medical History:  Past Medical History:   Diagnosis Date   • Acid reflux    • Anesthesia complication     AUNT ON FATHERS SIDES HAS PSEUDOCHOLINESTERASE DEF   • Arthritis    • Francisco esophagus    • Cancer (CMS/HCC)     non malignant skin cancers   • Cerebral aneurysm    • CHF (congestive heart failure) (CMS/HCC)    • Disease of thyroid gland     HISTORY OF NODULES, SURGERY.    • H/O TB skin testing     WITH POSITIVE RESULT. NEG CXR.   • Heart murmur    • Hyperlipidemia    • Hypertension    • Hypothyroidism    • Leaky heart valve    • Osteopenia    • Pacemaker     IMPLANTED LEFT SIDE   • Pseudocholinesterase deficiency     AUNT ON FATHER'S SIDE OF FAMILY   • RLS (restless legs syndrome)    • Sick sinus syndrome due to SA node dysfunction (CMS/HCC)     REASON FOR PACEMAKER   • Slow to wake up after anesthesia    • Stage 3 chronic kidney disease (CMS/HCC)        Past Surgical History:  Past Surgical History:   Procedure Laterality Date   • CATARACT EXTRACTION WITH INTRAOCULAR LENS IMPLANT      LEFT AND RIGHT   • COLONOSCOPY     • CYST  REMOVAL Right     REMOVED UNDER RIGHT ARM. BENIGN   • HYSTERECTOMY     • PACEMAKER IMPLANTATION Left     [employer]   • PARATHYROID GLAND SURGERY      ONE GLAND RIGHT   • THYROIDECTOMY, PARTIAL Right    • TONSILLECTOMY AND ADENOIDECTOMY          Home Meds:  Medications Prior to Admission   Medication Sig Dispense Refill Last Dose   • acetaminophen (TYLENOL) 500 MG tablet Take 1,000 mg by mouth Every 6 (Six) Hours As Needed for Mild Pain  or Fever. Two tablets once daily    11/25/2018 at Unknown time   • amLODIPine (NORVASC) 5 MG tablet Take 5 mg by mouth Daily.   11/26/2018 at Unknown time   • Aspirin (ASPIR-81 PO) Take 81 mg by mouth Daily. FOLLOWING DR HERNANDEZ INSTUCTIONS   11/26/2018 at Unknown time   • atorvastatin (LIPITOR) 20 MG tablet Take 20 mg by mouth Daily.   11/25/2018 at Unknown time   • azelastine (ASTEPRO) 0.15 % solution nasal spray 1 spray into the nostril(s) as directed by provider 2 (Two) Times a Day.   11/25/2018 at Unknown time   • calcium polycarbophil (FIBERCON) 625 MG tablet Take 1,250 mg by mouth Daily.   Past Week at Unknown time   • Cholecalciferol (VITAMIN D3) 2000 units tablet Take 2,000 Units by mouth Daily.   Past Week at Unknown time   • clopidogrel (PLAVIX) 75 MG tablet Take 1 tablet by mouth Daily. (Patient taking differently: Take 75 mg by mouth Daily. FOLLOWING DR LUNA'S INSTRUCTIONS) 30 tablet 3 11/26/2018 at 0830   • flunisolide (NASALIDE) 25 MCG/ACT (0.025%) solution nasal spray Inhale 2 sprays 2 (Two) Times a Day. 2 sprays daily   11/25/2018 at Unknown time   • furosemide (LASIX) 40 MG tablet Take 40 mg by mouth Daily As Needed (WITH SWELLING).   11/25/2018 at Unknown time   • levothyroxine (SYNTHROID, LEVOTHROID) 75 MCG tablet Take 75 mcg by mouth Daily.   11/25/2018 at Unknown time   • montelukast (SINGULAIR) 10 MG tablet Take 10 mg by mouth Daily.   11/25/2018 at Unknown time   • omeprazole (priLOSEC) 20 MG capsule Take 20 mg by mouth Daily As Needed (S/S REFLUX).    "2018 at Unknown time   • pantoprazole (PROTONIX) 40 MG EC tablet Take 40 mg by mouth Daily.   2018 at Unknown time   • POTASSIUM CHLORIDE PO Take 20 mEq by mouth Daily As Needed (AS NEEDED WITH FUROSEMIDE).   2018 at Unknown time   • rOPINIRole (REQUIP) 1 MG tablet Take 1 mg by mouth 2 (Two) Times a Day. TAKES IN AFTERNOON AND AT BEDTIME   2018 at Unknown time   • vitamin C (ASCORBIC ACID) 500 MG tablet Take 500 mg by mouth Daily.   2018 at Unknown time       Allergies:  Allergies   Allergen Reactions   • Codeine Other (See Comments)     bp drops   • Morphine And Related Hallucinations       Social History:   Social History     Socioeconomic History   • Marital status:      Spouse name: Not on file   • Number of children: Not on file   • Years of education: Not on file   • Highest education level: Not on file   Social Needs   • Financial resource strain: Not on file   • Food insecurity - worry: Not on file   • Food insecurity - inability: Not on file   • Transportation needs - medical: Not on file   • Transportation needs - non-medical: Not on file   Occupational History   • Occupation: retired   Tobacco Use   • Smoking status: Former Smoker     Packs/day: 1.50     Years: 25.00     Pack years: 37.50     Types: Cigarettes     Last attempt to quit:      Years since quittin.9   • Smokeless tobacco: Never Used   Substance and Sexual Activity   • Alcohol use: Yes     Comment: occasional   • Drug use: No   • Sexual activity: Not on file   Other Topics Concern   • Not on file   Social History Narrative   • Not on file       Family History:  Family History   Problem Relation Age of Onset   • Heart disease Father    • Stroke Maternal Grandmother    • Malig Hyperthermia Neg Hx        Physical Exam:  /63   Pulse 64   Temp 97.5 °F (36.4 °C) (Oral)   Resp 16   Ht 170.2 cm (67\")   Wt 77 kg (169 lb 12.1 oz)   SpO2 98%   BMI 26.59 kg/m²  Body mass index is 26.59 kg/m². 98% " 77 kg (169 lb 12.1 oz)  Physical Exam  Awake no distress no labored breathing  Oral cavity moist mucous membrane  Neck is supple no bruit no adenopathy  Chest is clear  CVS regular rate and rhythm no murmurs  Abdomen is benign  Extremities no edema  CNS no deficits  No joint deformities no skin rashes  LABS:   Lab Results   Component Value Date    CALCIUM 10.2 11/19/2018       No results found for: CKTOTAL, CKMB, CKMBINDEX, TROPONINI, TROPONINT                              No results found for: TSH  Estimated Creatinine Clearance: 47.7 mL/min (A) (by C-G formula based on SCr of 1.09 mg/dL (H)).         Imaging: I personally visualized the images of scans/x-rays performed within last 3 days.      Assessment:  Cerebral aneurysm without rupture  Status post embolization of cerebral aneurysm with stent  History of hypertension  History of CHF  History of hypothyroidism    Recommendations:  Neuro checks per protocol  Blood pressure remained stable  Pain control  Routine ICU care  Discussed with family            Curt Courtney MD  11/26/2018  9:19 PM      Much of this encounter note is an electronic transcription/translation of spoken language to printed text using Dragon Software.

## 2018-11-28 ENCOUNTER — TELEPHONE (OUTPATIENT)
Dept: NEUROSURGERY | Facility: CLINIC | Age: 73
End: 2018-11-28

## 2018-11-28 DIAGNOSIS — Z79.02 VISIT FOR MONITORING PLAVIX THERAPY: Primary | ICD-10-CM

## 2018-11-28 DIAGNOSIS — Z51.81 VISIT FOR MONITORING PLAVIX THERAPY: Primary | ICD-10-CM

## 2018-11-28 NOTE — OP NOTE
Cerebral angiography and embolization-complex- of left large partially calcified periophthalmic artery region cerebral aneurysm  Procedure note    Meme Morales  11/26/2018  [id number]    SURGEON  Ranjit Key MD    ASSISTANT:  Joanna Westbrook CFA  INDICATION:  The patient had the incidental finding of a large left approximately 11 mm periophthalmic artery region partially calcified aneurysm with a wide neck.  It is indicated to treat the aneurysm to reduce her risk of hemorrhage.    I went over the various options of treatment available for unruptured aneurysms. I explained that an aneurysm greater than 7 mm in size carries a 1-2% risk of rupture on a yearly basis. I explained that the natural history of an aneurysm if it were to rupture included 30% of patients dying immediately. Of the survivors, 30% would die despite completely appropriate medical treatment, 30% would be severely disabled, and 30% would have a satisfactory result from treatment of a ruptured aneurysm. I also explained the risks, benefits and alternatives of treatment of an aneurysm from either an endovascular or open surgical procedure.    The risks, benefits and alternatives of cerebral angiography and with embolization and FDA approved Neuroform stent placement for use in the affected artery to support coil embolization were explained in detail to the patient. The alternative is not to undergo this procedure. The benefit of cerebral angiography with embolization should be, though is not guaranteed, the elucidation of the vascular anatomy of the brain and the blood vessels leading to the brain, and placement of a series of coils into the aneurysm in order to prevent blood from flowing into it, thus potentially preventing rupture of the aneurysm. The benefit of FDA stenting is that it is less invasive than other surgical procedures and provides a network of support for the coils. The risks of the procedure include, but are not  limited to, the possibility of stroke, bleeding, blindness, damage to an artery in the brain, groin or neck (possibly requiring surgery to correct), infection, reaction to the contrast dye resulting in itching, swelling, anaphylaxis or even death, lack of ability to perform the procedure, need for further operative intervention, lack of ability to completely obliterate the aneurysm, reopening of the aneurysm, need for further embolization treatment or even open surgery to obliterate the aneurysm, compaction of the coils within the aneurysm causing possible reopening or rupture, etcetera. The patient voiced understanding of the risks, benefits and alternatives and requests that we proceed with cerebral angiography with coil embolization and stent placement.      Pre-op Diagnosis:   Cerebral aneurysm without rupture [I67.1]    Post-Op Diagnosis Codes:     * Cerebral aneurysm without rupture [I67.1]    PROCEDURE PERFORMED:  Procedure(s):  Cerebral angiography and embolization of cerebral aneurysm with possible Neuroform stent    1. Selective catheterization and left periophthalmic artery region internal carotid angiography including 3D reconstruction.  2. Super selective catheterization of left periophthalmic artery region aneurysm and intra-aneurysmal angiography  3. Embolization of cerebral aneurysm and supervision interpretation of the embolization  4. Post embolization angiography with 3 D reconstructed imaging X 1.  5. Post embolization angiography X 7.    Anesthesia: General    Staff:   Circulator: Irma Lehman RN  Scrub Person: Vinny Hilton Rebecca  Assistant: Joanna Westbrook CSA  Vascular Radiology Technician: Sharmaine Muñiz Kevin    Estimated Blood Loss:  minimal    Specimens:     Order Name Source Comment Collection Info Order Time   P2Y12 PLATELET INHIBITION   Collected By: Krunal Nieves RN 11/26/2018  9:13 PM   PLATELET RESPONSE ASPIRIN   Collected By: Ezequiel  Krunal Cardenas RN 11/26/2018  9:13 PM         Drains:   [REMOVED] Urethral Catheter Silicone 16 Fr. (Removed)   Daily Indications < 24 hr post op 11/27/2018  4:00 AM   Site Assessment Clean;Skin intact 11/27/2018  4:00 AM   Collection Container Standard drainage bag 11/27/2018  4:00 AM   Securement Method Securing device 11/27/2018  4:00 AM   Catheter care complete Yes 11/26/2018  8:00 PM   Output (mL) 1225 mL 11/27/2018  7:00 AM       Findings: 11 mm left periophthalmic artery region internal carotid artery cerebral aneurysm.  98-99% obliteration following embolization-complex-requiring the use of a Neuroform stent and endovascular coils.    Complications: None      PROCEDURE IN DETAIL: The patient was brought to the operating room where she was hooked up to EKG, oxygen saturation, and blood pressure monitoring and placed on the biplane angiography table. Continuous monitoring was accomplished during the operative intervention. General endotracheal anesthesia was induced.     The groin was prepped in the usual sterile manner. Baseline ACT was obtained as was P2Y12 value. After superselective catheterization of the aneurysm, the patient was given heparin and ACT maintained at 2X baseline.    Single wall puncture technique under ultrasonic guidance 6 Australian introducing sheath. 5 Australian Az catheter.    Under fluoroscopic guidance selective catheterization of the left internal carotid artery left internal carotid artery cerebral angiography in multiple projections including rotational angiography reconstructed in a 3D manner on a separate workstation.     This demonstrates the normal branching pattern of the internal carotid artery on the left. There is evidence of  a 11 mm cerebral aneurysm originating from the left internal carotid artery near the ophthalmic artery. The neck of the aneurysm measures 5 mm. aneurysms configuration is somewhat like an elongated heart with the majority of the body of the aneurysm  measuring about 5 mm but near the top of the dome of the aneurysm measures close to 7 mm in width.  It is about 11 mm from the artery to the top of the dome.  I would consider this essentially a very wide necked aneurysm.  There is no evidence of arterio-venous malformation, arterio-venous fistula or venous anomaly.  Anticoagulation.    Exchange of the 5F catheter over a super stiff glide wire for a 6F long guide catheter.     I placed the following microcatheter inside a Navien distal access catheter (DAC).  Super-selective catheterization of the aneurysm with a 45 degree angle tip SL 10 Excelsior catheter and a Synchro 14 micro wire. Intra-aneurysmal angiography showed the microcatheter to be in the aneurysm.    I then placed the microcatheter into the left middle cerebral artery.  I chose a 4.5 x 30 mm Neuroform atlas device and I embolized this across the neck of the aneurysm.    Postembolization arteriography #1 demonstrated the Neuroform atlas device to be satisfactorily placed across the neck of the aneurysm.    I then crossed the Neuroform atlas device into the aneurysm and again with a tiny puff of contrast demonstrated the microcatheter to be within the aneurysm.    I tried to place a 8 mm X 30 cm coil but it was not the correct size so I removed that coil.    Coil # 1 Embolization of the aneurysm with a Target 10 mm x 40 cm  soft coil. Post embolization  angiography showed the coil to be in the aneurysm and the parent vessels to be patent.    I attempted to place a 8 mm X 20 cm coil but it would not stay in the aneurysm so it was removed.     Coil #2: Embolization of the aneurysm with a Target 5 mm x 15 cm Ultrasoft coil. Post embolization  angiography # 3 showed the coil to be in the aneurysm and the parent vessels to be patent.    Coil #3: Embolization of the aneurysm with a Target 5 mm X 15cm Ultrasoft coil. Post embolization  angiography #4 showed the coil to be in the aneurysm and the parent  vessels to be patent.    Coil #4: Embolization of the aneurysm with a Target Candace 3 mm X 6 cm coil. Post embolization angiography #5 showed the coil to be in the aneurysm and the parent vessels to be patent.    Coil #5: Embolization of the aneurysm with a Target Candace 2mm X 3 cm  Coil.  Post embolization  angiography #6 showed the coil to be in the aneurysm and the parent vessels to be patent.    Coil #6: Embolization of the aneurysm with a Target Candace 2mm X 3 cm  Coil.  Post embolization  angiography #7 showed the coil to be in the aneurysm and the parent vessels to be patent.    Angiography and rotational angiography reconstructed in a 3D manner on a separate workstation showed the coils to be in the aneurysm and the parent vessels to be patent The aneurysm is satisfactoraly occluded >95%.    The complexity of this embolization requiring multiple coils as well as the Neuroform embolization device required approximately 100% longer than normal.    Arteriography through the existing sheath demonstrates the sheath to be within the common femoral artery. Minyx system was used to close the artery, pressure was held for the appropriate length of time and the patient was transferred to the recovery room in stable and good condition.    Ranjit Key MD     Date: 11/28/2018  Time: 2:51 PM

## 2018-11-28 NOTE — TELEPHONE ENCOUNTER
Patient called and said that Dr. Key wanted her to have labs done probably checking on plavix. She is going to be at hospital tomorrow can you enter order for whatever she needs to have done. Thanks!

## 2018-12-03 ENCOUNTER — LAB (OUTPATIENT)
Dept: LAB | Facility: HOSPITAL | Age: 73
End: 2018-12-03

## 2018-12-03 DIAGNOSIS — Z79.02 VISIT FOR MONITORING PLAVIX THERAPY: ICD-10-CM

## 2018-12-03 DIAGNOSIS — Z51.81 VISIT FOR MONITORING PLAVIX THERAPY: ICD-10-CM

## 2018-12-03 LAB
CLOSURE TME COLL+ADP BLD: 59 SECONDS (ref 52–122)
CLOSURE TME COLL+EPINEP BLD: 89 SECONDS (ref 68–148)
PA ADP PRP-ACNC: 22 PRU (ref 194–418)

## 2018-12-03 PROCEDURE — 85576 BLOOD PLATELET AGGREGATION: CPT

## 2018-12-03 PROCEDURE — 85576 BLOOD PLATELET AGGREGATION: CPT | Performed by: NURSE PRACTITIONER

## 2018-12-03 PROCEDURE — 36415 COLL VENOUS BLD VENIPUNCTURE: CPT

## 2018-12-05 ENCOUNTER — TELEPHONE (OUTPATIENT)
Dept: NEUROSURGERY | Facility: CLINIC | Age: 73
End: 2018-12-05

## 2018-12-05 NOTE — TELEPHONE ENCOUNTER
Pt had a Cerebral aneurysm without rupture    Pt had called today stating that she is seeing blood in her urine. The blood in light in color and she is finding small amount at the bottom of the commode. Her urgency and frequency to void is also increased. Pt denies any chills or having a fever. She does however state that she is feeling more weak in general, than normal.    Best number to call is [phone]

## 2018-12-05 NOTE — TELEPHONE ENCOUNTER
If its related to urination, see PCP. I assume she is not having any groin or flank bruising, swelling, pain, or leg symptoms. Sounds like UTI to me.

## 2018-12-05 NOTE — TELEPHONE ENCOUNTER
Denies any flank pain or leg symptoms. She will go to an immediate care center for evaluation of UTI symptoms.

## 2018-12-10 ENCOUNTER — OFFICE VISIT (OUTPATIENT)
Dept: NEUROSURGERY | Facility: CLINIC | Age: 73
End: 2018-12-10

## 2018-12-10 VITALS
BODY MASS INDEX: 27 KG/M2 | HEART RATE: 73 BPM | RESPIRATION RATE: 18 BRPM | SYSTOLIC BLOOD PRESSURE: 148 MMHG | DIASTOLIC BLOOD PRESSURE: 82 MMHG | HEIGHT: 67 IN | WEIGHT: 172 LBS

## 2018-12-10 DIAGNOSIS — Z79.02 PLATELET INHIBITION DUE TO PLAVIX: Primary | ICD-10-CM

## 2018-12-10 DIAGNOSIS — I67.1 CEREBRAL ANEURYSM WITHOUT RUPTURE: ICD-10-CM

## 2018-12-10 PROCEDURE — 99213 OFFICE O/P EST LOW 20 MIN: CPT | Performed by: NURSE PRACTITIONER

## 2018-12-10 RX ORDER — CLOPIDOGREL BISULFATE 75 MG/1
75 TABLET ORAL DAILY
Qty: 30 TABLET | Refills: 5 | Status: SHIPPED | OUTPATIENT
Start: 2018-12-10 | End: 2019-06-26 | Stop reason: HOSPADM

## 2018-12-10 NOTE — PROGRESS NOTES
" HPI:   Meme Morales is a 73 y.o. female for follow-up status post cerebral angiogram with Dr. Key on November 26, 2018.  She had an incidental discovery of a large left periophthalmic artery region aneurysm and she underwent embolization to decrease risk of rupture.  The aneurysm was embolized with endovascular coils Neuroform stent.  She was resumed on Plavix and aspirin postop.    She is doing well following the procedure and reports only minimal bruising at the puncture site.  She denies any pain, pulsations or any other problems at the site.  She has had a few episodes of blood in her urine as well as a few very small nosebleeds since being on the Plavix and aspirin.  She also reports increased bruising \"everywhere\" as result of the medication.  She denies any headaches, dizziness and balance or gait issues as well as any other neuro problems at this time.  She states that she is doing well.  She is very happy to have the surgery behind her.    She presents accompanied by her sister.     Review of Systems   Eyes: Negative for visual disturbance.   Cardiovascular: Negative for leg swelling.   Skin: Positive for wound (bruising  ). Negative for color change (or coolness of procedure leg) and pallor.   Neurological: Positive for headaches (occ'l). Negative for facial asymmetry, speech difficulty, weakness and numbness (of procedure leg).        /82 (BP Location: Left arm, Patient Position: Sitting)   Pulse 73   Resp 18   Ht 170.2 cm (67\")   Wt 78 kg (172 lb)   BMI 26.94 kg/m²     Physical Exam   Constitutional: She is oriented to person, place, and time. She appears well-developed and well-nourished. She is cooperative.  Non-toxic appearance. She does not have a sickly appearance. She does not appear ill.   Very pleasant, well-appearing older female   HENT:   Head: Normocephalic and atraumatic.   Eyes: EOM are normal.   Neck: Neck supple. No tracheal deviation present.   Cardiovascular: Intact " distal pulses.   Pulmonary/Chest: Effort normal.   Abdominal: Soft.   Musculoskeletal: Normal range of motion.   CRANE well   Neurological: She is alert and oriented to person, place, and time. She has normal strength. She displays no tremor. No cranial nerve deficit. Coordination and gait normal. GCS eye subscore is 4. GCS verbal subscore is 5. GCS motor subscore is 6.   Gait is stable and upright  Cranial nerves II through XII grossly intact   Skin: Skin is warm and dry.   Psychiatric: She has a normal mood and affect. Her behavior is normal. Thought content normal.   Vitals reviewed.    Neurologic Exam     Mental Status   Oriented to person, place, and time.     Cranial Nerves     CN III, IV, VI   Extraocular motions are normal.     Motor Exam     Strength   Strength 5/5 throughout.       Findings/Results:  No new imaging      Assessment/Plan:  Meme was seen today for post-op.    Diagnoses and all orders for this visit:    Platelet inhibition due to Plavix  -     P2Y12 Platelet Inhibition; Future  -     clopidogrel (PLAVIX) 75 MG tablet; Take 1 tablet by mouth Daily.    Cerebral aneurysm without rupture        Discussion/Summary  She returns the office today for first postop visit status post coil embolization with stent placement for treatment of the large cerebral aneurysm with Dr. Key.  Exam as noted above, no red flags.  She is doing very well and is asymptomatic following the angiogram with the exception of very minimal bruising at the groin site.  However, as noted above, she does have infrequent minimal nosebleeds as well as a few episodes of hematuria.  I'm concerned that the Plavix can be responsible for this.  Her P2 Y 12 lab work revealed 22, which is very low within the therapeutic range.  Given these new issues, have instructed her to take the Plavix every other day.  We will get follow-up P2 Y 12 testing this Friday to ensure that she remains therapeutic.  If she has any additional bleeding, she  is to let our office know.    Otherwise, she knows that it's imperative to take the Plavix as directed.  We'll plan on seeing her back in 6 months to schedule follow-up cerebral angiogram for continued aneurysm surveillance.  She is to call at anytime with any questions or concerns.  She will be contacted regarding the Plavix lab results once they are available next Friday with instructions on how to continue taking the medication.    Plan: P2 Y 12 testing extracted, return to office in 6 months, take Plavix and aspirin daily as directed  Plan: No Follow-up on file.         Patient Care Team    Patient Care Team:  Renita Price MD as PCP - General (Internal Medicine)  Xochilt Conner MD as Consulting Physician (Cardiology)    Aundrea Tinajero, APRN  12/10/2018    Dragon disclaimer:   Much of this encounter note is an electronic transcription/translation of spoken language to printed text. The electronic translation of spoken language may permit erroneous, or at times, nonsensical words or phrases to be inadvertently transcribed; Although I have reviewed the note for such errors, some may still exist.

## 2018-12-14 ENCOUNTER — LAB (OUTPATIENT)
Dept: LAB | Facility: HOSPITAL | Age: 73
End: 2018-12-14

## 2018-12-14 DIAGNOSIS — Z79.02 PLATELET INHIBITION DUE TO PLAVIX: ICD-10-CM

## 2018-12-14 DIAGNOSIS — Z79.02 PLATELET INHIBITION DUE TO PLAVIX: Primary | ICD-10-CM

## 2018-12-14 LAB — PA ADP PRP-ACNC: 53 PRU (ref 194–418)

## 2018-12-14 PROCEDURE — 85576 BLOOD PLATELET AGGREGATION: CPT

## 2018-12-14 PROCEDURE — 36415 COLL VENOUS BLD VENIPUNCTURE: CPT

## 2018-12-21 ENCOUNTER — LAB (OUTPATIENT)
Dept: LAB | Facility: HOSPITAL | Age: 73
End: 2018-12-21

## 2018-12-21 DIAGNOSIS — Z79.02 PLATELET INHIBITION DUE TO PLAVIX: ICD-10-CM

## 2018-12-21 LAB — PA ADP PRP-ACNC: 81 PRU (ref 194–418)

## 2018-12-21 PROCEDURE — 85576 BLOOD PLATELET AGGREGATION: CPT

## 2018-12-21 PROCEDURE — 36415 COLL VENOUS BLD VENIPUNCTURE: CPT

## 2019-05-15 ENCOUNTER — OFFICE VISIT (OUTPATIENT)
Dept: NEUROSURGERY | Facility: CLINIC | Age: 74
End: 2019-05-15

## 2019-05-15 VITALS
BODY MASS INDEX: 26.84 KG/M2 | HEART RATE: 60 BPM | WEIGHT: 171 LBS | DIASTOLIC BLOOD PRESSURE: 76 MMHG | RESPIRATION RATE: 12 BRPM | HEIGHT: 67 IN | SYSTOLIC BLOOD PRESSURE: 154 MMHG

## 2019-05-15 DIAGNOSIS — I67.1 CEREBRAL ANEURYSM, NONRUPTURED: Primary | ICD-10-CM

## 2019-05-15 DIAGNOSIS — I67.89 OTHER CEREBROVASCULAR DISEASE: ICD-10-CM

## 2019-05-15 DIAGNOSIS — I67.1 CEREBRAL ANEURYSM WITHOUT RUPTURE: ICD-10-CM

## 2019-05-15 PROCEDURE — 99214 OFFICE O/P EST MOD 30 MIN: CPT | Performed by: NURSE PRACTITIONER

## 2019-05-15 RX ORDER — SODIUM CHLORIDE 9 MG/ML
100 INJECTION, SOLUTION INTRAVENOUS CONTINUOUS
Status: CANCELLED | OUTPATIENT
Start: 2019-06-26

## 2019-05-15 NOTE — PROGRESS NOTES
"Subjective   Patient ID: Meme Morales is a 74 y.o. female is here today for follow-up of aneurysm.  Pt is accompanied by her sister Lisy.    History of Present Illness     She returns to the office today for preoperative evaluation before undergoing follow-up cerebral angiogram with Dr. Key.  She is status post embolization with coiling and stents of a large left periophthalmic artery region aneurysm on November 26, 2018.  She has been on Plavix and aspirin since.  She denies any problems postop, including dizziness, headaches and lightheadedness.  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  She is tolerating the medication well and reports compliance with the Plavix.  She denies any new problems today.    She presents with her sister.      /76 (BP Location: Right arm, Patient Position: Sitting, Cuff Size: Adult)   Pulse 60   Resp 12   Ht 170.2 cm (67\")   Wt 77.6 kg (171 lb)   BMI 26.78 kg/m²     The following portions of the patient's history were reviewed and updated as appropriate: allergies, current medications, past family history, past medical history, past social history, past surgical history and problem list.    Review of Systems   Genitourinary: Negative for difficulty urinating and enuresis.   Neurological: Positive for dizziness (occasionally), light-headedness and headaches. Negative for tremors, seizures, syncope, facial asymmetry, speech difficulty, weakness and numbness.   Psychiatric/Behavioral: Negative for confusion.       Objective   Physical Exam   Constitutional: She is oriented to person, place, and time. She appears well-developed and well-nourished. She is cooperative.  Non-toxic appearance. She does not have a sickly appearance. She does not appear ill.   Pleasant well-appearing older female   HENT:   Head: Normocephalic and atraumatic.   Eyes: EOM are normal.   Neck: Neck supple.   Cardiovascular: Normal rate, regular rhythm, normal heart sounds and " intact distal pulses.   Pulmonary/Chest: Effort normal and breath sounds normal. No respiratory distress.   Abdominal: Soft.   Musculoskeletal: Normal range of motion.   Moving all extremities well   Neurological: She is alert and oriented to person, place, and time. She has normal strength. She displays no tremor and normal reflexes. No cranial nerve deficit. Coordination and gait normal. GCS eye subscore is 4. GCS verbal subscore is 5. GCS motor subscore is 6.   Gait is stable and upright  Cranial nerves II through XII grossly intact   Skin: Skin is warm and dry.   Psychiatric: She has a normal mood and affect. Her behavior is normal. Thought content normal.   Vitals reviewed.    Neurologic Exam     Mental Status   Oriented to person, place, and time.     Cranial Nerves     CN III, IV, VI   Extraocular motions are normal.     Motor Exam     Strength   Strength 5/5 throughout.       Assessment/Plan   Independent Review of Radiographic Studies:    No new imaging    Medical Decision Making:    Returns the office today for follow-up status post embolization with coiling and stent placement of a cerebral angiogram with Dr. Key last November.  Exam as noted above, no red flags.  She has been taking the Plavix and aspirin daily as directed.  She will be scheduled for follow-up cerebral angiogram following today's office visit for continued aneurysm surveillance.  Upon full clinical evaluation she is stable to proceed forward with the angiogram scheduled.  Risks and benefits were explained at length and she has expressed understanding.  Preoperative testing will be ordered and reviewed accordingly.  Overall, she continues to do very well.     Plan: Cerebral angiogram to be scheduled following today's office visit, return to office as directed      Meme was seen today for cerebral aneurysm.    Diagnoses and all orders for this visit:    Cerebral aneurysm, nonruptured  -     Case Request; Standing  -     CBC and  Differential; Future  -     Comprehensive metabolic panel; Future  -     Protime-INR; Future  -     Sedimentation rate; Future  -     sodium chloride 0.9 % infusion  -     ECG 12 Lead; Future  -     Case Request    Other cerebrovascular disease   -     Case Request; Standing  -     CBC and Differential; Future  -     Comprehensive metabolic panel; Future  -     Protime-INR; Future  -     Sedimentation rate; Future  -     sodium chloride 0.9 % infusion  -     ECG 12 Lead; Future  -     Case Request    Cerebral aneurysm without rupture    Other orders  -     Follow Anesthesia Guidelines / Standing Orders; Future  -     Obtain informed consent  -     Provide NPO Instructions to Patient; Future  -     Follow Anesthesia Guidelines / Standing Orders; Standing  -     Verify NPO Status; Standing  -     POC Glucose Once; Standing      Return for Next scheduled follow up.

## 2019-05-20 ENCOUNTER — RESULTS ENCOUNTER (OUTPATIENT)
Dept: NEUROSURGERY | Facility: CLINIC | Age: 74
End: 2019-05-20

## 2019-05-20 DIAGNOSIS — I67.89 OTHER CEREBROVASCULAR DISEASE: ICD-10-CM

## 2019-05-20 DIAGNOSIS — I67.1 CEREBRAL ANEURYSM, NONRUPTURED: ICD-10-CM

## 2019-06-10 ENCOUNTER — APPOINTMENT (OUTPATIENT)
Dept: PREADMISSION TESTING | Facility: HOSPITAL | Age: 74
End: 2019-06-10

## 2019-06-12 ENCOUNTER — OFFICE VISIT (OUTPATIENT)
Dept: NEUROSURGERY | Facility: CLINIC | Age: 74
End: 2019-06-12

## 2019-06-12 ENCOUNTER — APPOINTMENT (OUTPATIENT)
Dept: PREADMISSION TESTING | Facility: HOSPITAL | Age: 74
End: 2019-06-12

## 2019-06-12 VITALS
SYSTOLIC BLOOD PRESSURE: 130 MMHG | RESPIRATION RATE: 18 BRPM | DIASTOLIC BLOOD PRESSURE: 82 MMHG | HEART RATE: 63 BPM | BODY MASS INDEX: 26.68 KG/M2 | HEIGHT: 67 IN | WEIGHT: 170 LBS

## 2019-06-12 VITALS
HEIGHT: 67 IN | RESPIRATION RATE: 16 BRPM | OXYGEN SATURATION: 100 % | TEMPERATURE: 98.3 F | DIASTOLIC BLOOD PRESSURE: 74 MMHG | WEIGHT: 170 LBS | BODY MASS INDEX: 26.68 KG/M2 | SYSTOLIC BLOOD PRESSURE: 178 MMHG | HEART RATE: 61 BPM

## 2019-06-12 DIAGNOSIS — I67.1 CEREBRAL ANEURYSM, NONRUPTURED: ICD-10-CM

## 2019-06-12 DIAGNOSIS — I67.89 OTHER CEREBROVASCULAR DISEASE: ICD-10-CM

## 2019-06-12 DIAGNOSIS — I67.1 CEREBRAL ANEURYSM WITHOUT RUPTURE: Primary | ICD-10-CM

## 2019-06-12 LAB
ALBUMIN SERPL-MCNC: 4.2 G/DL (ref 3.5–5.2)
ALBUMIN/GLOB SERPL: 1.6 G/DL
ALP SERPL-CCNC: 62 U/L (ref 39–117)
ALT SERPL W P-5'-P-CCNC: 16 U/L (ref 1–33)
ANION GAP SERPL CALCULATED.3IONS-SCNC: 10.4 MMOL/L
AST SERPL-CCNC: 19 U/L (ref 1–32)
BASOPHILS # BLD AUTO: 0.08 10*3/MM3 (ref 0–0.2)
BASOPHILS NFR BLD AUTO: 1.6 % (ref 0–1.5)
BILIRUB SERPL-MCNC: 0.4 MG/DL (ref 0.2–1.2)
BUN BLD-MCNC: 26 MG/DL (ref 8–23)
BUN/CREAT SERPL: 26.5 (ref 7–25)
CALCIUM SPEC-SCNC: 9.9 MG/DL (ref 8.6–10.5)
CHLORIDE SERPL-SCNC: 102 MMOL/L (ref 98–107)
CO2 SERPL-SCNC: 26.6 MMOL/L (ref 22–29)
CREAT BLD-MCNC: 0.98 MG/DL (ref 0.57–1)
DEPRECATED RDW RBC AUTO: 50.6 FL (ref 37–54)
EOSINOPHIL # BLD AUTO: 0.58 10*3/MM3 (ref 0–0.4)
EOSINOPHIL NFR BLD AUTO: 11.3 % (ref 0.3–6.2)
ERYTHROCYTE [DISTWIDTH] IN BLOOD BY AUTOMATED COUNT: 13.9 % (ref 12.3–15.4)
ERYTHROCYTE [SEDIMENTATION RATE] IN BLOOD: 10 MM/HR (ref 0–30)
GFR SERPL CREATININE-BSD FRML MDRD: 55 ML/MIN/1.73
GLOBULIN UR ELPH-MCNC: 2.7 GM/DL
GLUCOSE BLD-MCNC: 85 MG/DL (ref 65–99)
HCT VFR BLD AUTO: 37.1 % (ref 34–46.6)
HGB BLD-MCNC: 11.4 G/DL (ref 12–15.9)
IMM GRANULOCYTES # BLD AUTO: 0.01 10*3/MM3 (ref 0–0.05)
IMM GRANULOCYTES NFR BLD AUTO: 0.2 % (ref 0–0.5)
INR PPP: 0.98 (ref 0.9–1.1)
LYMPHOCYTES # BLD AUTO: 1.25 10*3/MM3 (ref 0.7–3.1)
LYMPHOCYTES NFR BLD AUTO: 24.4 % (ref 19.6–45.3)
MCH RBC QN AUTO: 30 PG (ref 26.6–33)
MCHC RBC AUTO-ENTMCNC: 30.7 G/DL (ref 31.5–35.7)
MCV RBC AUTO: 97.6 FL (ref 79–97)
MONOCYTES # BLD AUTO: 0.44 10*3/MM3 (ref 0.1–0.9)
MONOCYTES NFR BLD AUTO: 8.6 % (ref 5–12)
NEUTROPHILS # BLD AUTO: 2.77 10*3/MM3 (ref 1.7–7)
NEUTROPHILS NFR BLD AUTO: 53.9 % (ref 42.7–76)
NRBC BLD AUTO-RTO: 0 /100 WBC (ref 0–0.2)
PLATELET # BLD AUTO: 226 10*3/MM3 (ref 140–450)
PMV BLD AUTO: 11.3 FL (ref 6–12)
POTASSIUM BLD-SCNC: 4.5 MMOL/L (ref 3.5–5.2)
PROT SERPL-MCNC: 6.9 G/DL (ref 6–8.5)
PROTHROMBIN TIME: 12.7 SECONDS (ref 11.7–14.2)
RBC # BLD AUTO: 3.8 10*6/MM3 (ref 3.77–5.28)
SODIUM BLD-SCNC: 139 MMOL/L (ref 136–145)
WBC NRBC COR # BLD: 5.13 10*3/MM3 (ref 3.4–10.8)

## 2019-06-12 PROCEDURE — 85025 COMPLETE CBC W/AUTO DIFF WBC: CPT | Performed by: NEUROLOGICAL SURGERY

## 2019-06-12 PROCEDURE — 36415 COLL VENOUS BLD VENIPUNCTURE: CPT

## 2019-06-12 PROCEDURE — 99214 OFFICE O/P EST MOD 30 MIN: CPT | Performed by: NURSE PRACTITIONER

## 2019-06-12 PROCEDURE — 93010 ELECTROCARDIOGRAM REPORT: CPT | Performed by: INTERNAL MEDICINE

## 2019-06-12 PROCEDURE — 80053 COMPREHEN METABOLIC PANEL: CPT | Performed by: NEUROLOGICAL SURGERY

## 2019-06-12 PROCEDURE — 85610 PROTHROMBIN TIME: CPT | Performed by: NEUROLOGICAL SURGERY

## 2019-06-12 PROCEDURE — 93005 ELECTROCARDIOGRAM TRACING: CPT

## 2019-06-12 PROCEDURE — 85652 RBC SED RATE AUTOMATED: CPT | Performed by: NEUROLOGICAL SURGERY

## 2019-06-12 NOTE — PROGRESS NOTES
"Subjective   Patient ID: Meme Morales is a 74 y.o. female is here today for pre-angio.  Patient presents accompanied by her sister.     History of Present Illness    She returns to the office today for preoperative evaluation before undergoing follow-up cerebral angiogram with Dr. Key on June 26.  She is status post stabilization with coiling and stents of a large left periophthalmic artery region aneurysm on November 26, 2018.  She has been on Plavix and aspirin since.    She overall is doing very well.  She does report increased bruising as a result of the medications.  She denies any dizziness, headaches or lightheadedness.  She is hopeful to get off the Plavix soon.  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  She denies any new problems.    She presents with her sister.    /82 (BP Location: Left arm, Patient Position: Sitting)   Pulse 63   Resp 18   Ht 170.2 cm (67\")   Wt 77.1 kg (170 lb)   BMI 26.63 kg/m²     The following portions of the patient's history were reviewed and updated as appropriate: allergies, current medications, past family history, past medical history, past social history, past surgical history and problem list.    Review of Systems   Constitutional: Negative for chills and fever.   Eyes: Negative for visual disturbance.   Respiratory: Negative for cough and shortness of breath.    Cardiovascular: Positive for palpitations. Negative for chest pain.   Gastrointestinal: Negative for nausea and vomiting.   Musculoskeletal: Positive for neck pain. Negative for gait problem.   Skin: Negative for rash and wound.   Neurological: Negative for dizziness, weakness and headaches.   Hematological: Bruises/bleeds easily.   Psychiatric/Behavioral: Positive for sleep disturbance (wrist pain). The patient is not nervous/anxious.        Objective   Physical Exam   Constitutional: She is oriented to person, place, and time. She appears well-developed and well-nourished. She " is cooperative.  Non-toxic appearance. She does not have a sickly appearance. She does not appear ill.   Very pleasant well-appearing older female   HENT:   Head: Normocephalic and atraumatic.   Neck: Neck supple. Carotid bruit is not present. No tracheal deviation present.   Cardiovascular: Normal rate, regular rhythm, S1 normal, S2 normal, normal heart sounds and intact distal pulses.   Pulmonary/Chest: Effort normal and breath sounds normal.   Abdominal: Soft.   Musculoskeletal: Normal range of motion.   Moving all extremities well   Neurological: She is alert and oriented to person, place, and time. She has normal strength. She displays no tremor. No cranial nerve deficit. Coordination and gait normal. GCS eye subscore is 4. GCS verbal subscore is 5. GCS motor subscore is 6.   Stable and upright   Skin: Skin is warm and dry.   Psychiatric: She has a normal mood and affect. Her behavior is normal. Thought content normal.   Vitals reviewed.    Neurologic Exam     Mental Status   Oriented to person, place, and time.     Motor Exam     Strength   Strength 5/5 throughout.       Assessment/Plan   Independent Review of Radiographic Studies:    Preoperative lab work reviewed revealing stable findings.    EKG dated June 12 reveals an atrial paced rhythm with incomplete right bundle branch block, unchanged when compared to prior EKGs    Medical Decision Making:    She returns the office today for preoperative evaluation and updated history and physical before undergoing follow-up cerebral angiogram with Dr. Key.  Exam as noted above, no red flags.  Clinically, she is doing very well.  She has been compliant with the Plavix and aspirin as directed.  Risks and benefits of the procedure were explained at length and she is ready to proceed forward.  Preoperative lab work is reviewed and shows stable findings.  Preoperative EKG is unchanged with an atrial paced rhythm and incomplete right bundle branch block.  She is okay to  proceed forward with the procedure as scheduled.  She is to continue the aspirin and Plavix through the day of the procedure.  She is to followed up with her previously scheduled postoperative appointment.    Plan: Cerebral angiogram as scheduled, return office as directed  Meme was seen today for pre-op exam.    Diagnoses and all orders for this visit:    Cerebral aneurysm without rupture      Return for Next scheduled follow up.

## 2019-06-12 NOTE — DISCHARGE INSTRUCTIONS
Take the following medications the morning of surgery with a small sip of water:    AMLODIPINE,  LEVOTHYROXINE AND PANTROPRAZOLE    Arrive at 6:00    General Instructions:  • Do not eat or drink anything after midnight the night before surgery.  • Infants may have breast milk up to four hours before surgery.  • Infants drinking formula may drink formula up to six hours before surgery.   • Patients who avoid smoking, chewing tobacco and alcohol for 4 weeks prior to surgery have a reduced risk of post-operative complications.  Quit smoking as many days before surgery as you can.  • Do not smoke, use chewing tobacco or drink alcohol the day of surgery.   • If applicable bring your C-PAP/ BI-PAP machine.  • Bring any papers given to you in the doctor’s office.  • Wear clean comfortable clothes and socks.  • Do not wear contact lenses, false eyelashes or make-up.  Bring a case for your glasses.   • Bring crutches or walker if applicable.  • Remove all piercings.  Leave jewelry and any other valuables at home.  • Hair extensions with metal clips must be removed prior to surgery.  • The Pre-Admission Testing nurse will instruct you to bring medications if unable to obtain an accurate list in Pre-Admission Testing.        If you were given a blood bank ID arm band remember to bring it with you the day of surgery.    Preventing a Surgical Site Infection:  • For 2 to 3 days before surgery, avoid shaving with a razor because the razor can irritate skin and make it easier to develop an infection.    • Any areas of open skin can increase the risk of a post-operative wound infection by allowing bacteria to enter and travel throughout the body.  Notify your surgeon if you have any skin wounds / rashes even if it is not near the expected surgical site.  The area will need assessed to determine if surgery should be delayed until it is healed.  • The night prior to surgery sleep in a clean bed with clean clothing.  Do not allow pets  to sleep with you.  • Shower on the morning of surgery using a fresh bar of anti-bacterial soap (such as Dial) and clean washcloth.  Dry with a clean towel and dress in clean clothing.  • Ask your surgeon if you will be receiving antibiotics prior to surgery.  • Make sure you, your family, and all healthcare providers clean their hands with soap and water or an alcohol based hand  before caring for you or your wound.    Day of surgery:  Upon arrival, a Pre-op nurse and Anesthesiologist will review your health history, obtain vital signs, and answer questions you may have.  The only belongings needed at this time will be your home medications and if applicable your C-PAP/BI-PAP machine.  If you are staying overnight your family can leave the rest of your belongings in the car and bring them to your room later.  A Pre-op nurse will start an IV and you may receive medication in preparation for surgery, including something to help you relax.  Your family will be able to see you in the Pre-op area.  While you are in surgery your family should notify the waiting room  if they leave the waiting room area and provide a contact phone number.    Please be aware that surgery does come with discomfort.  We want to make every effort to control your discomfort so please discuss any uncontrolled symptoms with your nurse.   Your doctor will most likely have prescribed pain medications.      If you are going home after surgery you will receive individualized written care instructions before being discharged.  A responsible adult must drive you to and from the hospital on the day of your surgery and stay with you for 24 hours.    If you are staying overnight following surgery, you will be transported to your hospital room following the recovery period.  [facility] has all private rooms.    You have received a list of surgical assistants for your reference.  If you have any questions please call  Pre-Admission Testing at [phone].  Deductibles and co-payments are collected on the day of service. Please be prepared to pay the required co-pay, deductible or deposit on the day of service as defined by your plan.

## 2019-06-25 PROBLEM — I67.1 CEREBRAL ANEURYSM, NONRUPTURED: Status: ACTIVE | Noted: 2019-05-21

## 2019-06-25 PROBLEM — I67.89 OTHER CEREBROVASCULAR DISEASE: Status: ACTIVE | Noted: 2019-05-21

## 2019-06-26 ENCOUNTER — HOSPITAL ENCOUNTER (OUTPATIENT)
Facility: HOSPITAL | Age: 74
Setting detail: HOSPITAL OUTPATIENT SURGERY
Discharge: HOME | End: 2019-06-26
Attending: NEUROLOGICAL SURGERY | Admitting: NEUROLOGICAL SURGERY

## 2019-06-26 ENCOUNTER — APPOINTMENT (OUTPATIENT)
Dept: GENERAL RADIOLOGY | Facility: HOSPITAL | Age: 74
End: 2019-06-26

## 2019-06-26 VITALS
RESPIRATION RATE: 16 BRPM | DIASTOLIC BLOOD PRESSURE: 64 MMHG | TEMPERATURE: 97.8 F | BODY MASS INDEX: 26.4 KG/M2 | HEART RATE: 64 BPM | SYSTOLIC BLOOD PRESSURE: 125 MMHG | WEIGHT: 168.21 LBS | OXYGEN SATURATION: 96 % | HEIGHT: 67 IN

## 2019-06-26 DIAGNOSIS — I67.89 OTHER CEREBROVASCULAR DISEASE: ICD-10-CM

## 2019-06-26 DIAGNOSIS — I67.1 CEREBRAL ANEURYSM, NONRUPTURED: ICD-10-CM

## 2019-06-26 PROCEDURE — 25010000002 MIDAZOLAM PER 1 MG

## 2019-06-26 PROCEDURE — C1894 INTRO/SHEATH, NON-LASER: HCPCS | Performed by: NEUROLOGICAL SURGERY

## 2019-06-26 PROCEDURE — C1760 CLOSURE DEV, VASC: HCPCS | Performed by: NEUROLOGICAL SURGERY

## 2019-06-26 PROCEDURE — 25010000002 HEPARIN (PORCINE) PER 1000 UNITS: Performed by: NEUROLOGICAL SURGERY

## 2019-06-26 PROCEDURE — 36224 PLACE CATH CAROTD ART: CPT | Performed by: NEUROLOGICAL SURGERY

## 2019-06-26 PROCEDURE — 76377 3D RENDER W/INTRP POSTPROCES: CPT | Performed by: NEUROLOGICAL SURGERY

## 2019-06-26 PROCEDURE — 25010000002 MIDAZOLAM PER 1 MG: Performed by: NEUROLOGICAL SURGERY

## 2019-06-26 PROCEDURE — 76377 3D RENDER W/INTRP POSTPROCES: CPT

## 2019-06-26 PROCEDURE — 0 IODIXANOL PER 1 ML: Performed by: NEUROLOGICAL SURGERY

## 2019-06-26 PROCEDURE — C1769 GUIDE WIRE: HCPCS | Performed by: NEUROLOGICAL SURGERY

## 2019-06-26 RX ORDER — IODIXANOL 320 MG/ML
250 INJECTION, SOLUTION INTRAVASCULAR
Status: COMPLETED | OUTPATIENT
Start: 2019-06-26 | End: 2019-06-26

## 2019-06-26 RX ORDER — MIDAZOLAM HYDROCHLORIDE 1 MG/ML
1 INJECTION INTRAMUSCULAR; INTRAVENOUS ONCE
Status: COMPLETED | OUTPATIENT
Start: 2019-06-26 | End: 2019-06-26

## 2019-06-26 RX ORDER — MIDAZOLAM HYDROCHLORIDE 1 MG/ML
INJECTION INTRAMUSCULAR; INTRAVENOUS
Status: COMPLETED
Start: 2019-06-26 | End: 2019-06-26

## 2019-06-26 RX ORDER — SODIUM CHLORIDE 9 MG/ML
100 INJECTION, SOLUTION INTRAVENOUS CONTINUOUS
Status: DISCONTINUED | OUTPATIENT
Start: 2019-06-26 | End: 2019-06-26 | Stop reason: HOSPADM

## 2019-06-26 RX ORDER — MIDAZOLAM HYDROCHLORIDE 1 MG/ML
INJECTION INTRAMUSCULAR; INTRAVENOUS
Status: COMPLETED | OUTPATIENT
Start: 2019-06-26 | End: 2019-06-26

## 2019-06-26 RX ADMIN — Medication 2 MG: at 08:29

## 2019-06-26 RX ADMIN — Medication 1 MG: at 08:04

## 2019-06-26 RX ADMIN — Medication 1 MG: at 08:47

## 2019-06-26 RX ADMIN — SODIUM CHLORIDE 100 ML/HR: 9 INJECTION, SOLUTION INTRAVENOUS at 07:02

## 2019-06-26 RX ADMIN — IODIXANOL 53.6 ML: 320 INJECTION, SOLUTION INTRAVASCULAR at 08:57

## 2019-06-26 RX ADMIN — MIDAZOLAM HYDROCHLORIDE 1 MG: 1 INJECTION INTRAMUSCULAR; INTRAVENOUS at 08:04

## 2019-07-10 ENCOUNTER — OFFICE VISIT (OUTPATIENT)
Dept: NEUROSURGERY | Facility: CLINIC | Age: 74
End: 2019-07-10

## 2019-07-10 VITALS
SYSTOLIC BLOOD PRESSURE: 120 MMHG | BODY MASS INDEX: 26.21 KG/M2 | HEIGHT: 67 IN | WEIGHT: 167 LBS | DIASTOLIC BLOOD PRESSURE: 68 MMHG | HEART RATE: 60 BPM | RESPIRATION RATE: 18 BRPM

## 2019-07-10 DIAGNOSIS — I67.1 CEREBRAL ANEURYSM, NONRUPTURED: Primary | ICD-10-CM

## 2019-07-10 PROCEDURE — 99024 POSTOP FOLLOW-UP VISIT: CPT | Performed by: NURSE PRACTITIONER

## 2019-07-10 NOTE — PROGRESS NOTES
" HPI:   Meme Morales is a 74 y.o. female for follow-up status post cerebral angiogram with Dr. Key on June 26.  The procedure was performed for follow-up of the previously embolized a left periophthalmic artery aneurysm with stent and coils on November 26, 2018.  She has been on Plavix and aspirin since.    Patient reports that she is doing and feeling very well following the most recent procedure.  She denies any bruising, tenderness or pulsation at the groin site.  She denies any new problems including dizziness or headaches.  She overall states that she is feeling very well.    She presents unaccompanied.      /68 (BP Location: Left arm, Patient Position: Sitting)   Pulse 60   Resp 18   Ht 170.2 cm (67\")   Wt 75.8 kg (167 lb)   BMI 26.16 kg/m²       Review of Systems   Eyes: Negative for visual disturbance.   Cardiovascular: Negative for leg swelling.   Skin: Negative for color change (or coolness of procedure leg), pallor and wound (swelling, excess bruising or bleeding of groin site ).   Neurological: Negative for facial asymmetry, speech difficulty, weakness, numbness (of procedure leg) and headaches.     Physical Exam   Constitutional: She is oriented to person, place, and time. Vital signs are normal. She appears well-developed and well-nourished. She is cooperative.  Non-toxic appearance. She does not have a sickly appearance. She does not appear ill.   Very pleasant well-appearing older female   HENT:   Head: Normocephalic and atraumatic.   Eyes: EOM are normal.   Neck: Neck supple. No tracheal deviation present.   Pulmonary/Chest: Effort normal.   Musculoskeletal: Normal range of motion.   Moving all extremities well   Neurological: She is alert and oriented to person, place, and time. She has normal strength. She displays no tremor. No cranial nerve deficit or sensory deficit. Coordination and gait normal. GCS eye subscore is 4. GCS verbal subscore is 5. GCS motor subscore is 6.   Gait " "is stable and upright  Hernial nerves II through XII grossly intact   Skin: Skin is warm and dry.   Psychiatric: She has a normal mood and affect. Her behavior is normal. Thought content normal.   Vitals reviewed.    Neurologic Exam     Mental Status   Oriented to person, place, and time.     Cranial Nerves     CN III, IV, VI   Extraocular motions are normal.     Motor Exam     Strength   Strength 5/5 throughout.       Findings/Results:  Operative findings from the cerebral angiogram revealed 100% obliteration of the aneurysm without recanalization, as reviewed and discussed with Dr. Key      Assessment/Plan:  Meme was seen today for post-op.    Diagnoses and all orders for this visit:    Cerebral aneurysm, nonruptured  -     CT Angiogram Head With & Without Contrast; Future        Discussion/Summary  She was seen in the office today for ongoing follow-up with history of a left periophthalmic artery cerebral aneurysm status post embolization in November 2018.  Exam as noted above, no red flags.  Follow-up angiogram completed on June 26 revealed 100% obliteration of the aneurysm.  She is doing very well clinically with no new deficits or concerns.  She is off the Plavix but remains on the aspirin following the procedure.  As discussed with Dr. Key we will have her return the office in 4 years with CTA imaging for continued aneurysm surveillance.  She will call at anytime with any questions or concerns.  She has no restrictions from our standpoint.    Plan: Return to office in 4 years with CTA head  Plan: Return in about 4 years (around 7/10/2023).         Patient Care Team    Patient Care Team:  Renita Price MD as PCP - General (Internal Medicine)  Xochilt Conner MD as Consulting Physician (Cardiology)    Aundrea Tinajero, APRN  7/10/2019    \"Dictated utilizing Dragon dictation\".    "

## 2019-07-31 ENCOUNTER — OFFICE VISIT (OUTPATIENT)
Dept: CARDIOLOGY | Facility: CLINIC | Age: 74
End: 2019-07-31

## 2019-07-31 VITALS
DIASTOLIC BLOOD PRESSURE: 62 MMHG | SYSTOLIC BLOOD PRESSURE: 118 MMHG | HEIGHT: 67 IN | WEIGHT: 170 LBS | BODY MASS INDEX: 26.68 KG/M2 | HEART RATE: 65 BPM

## 2019-07-31 DIAGNOSIS — I10 ESSENTIAL HYPERTENSION: Primary | ICD-10-CM

## 2019-07-31 DIAGNOSIS — Z95.0 PRESENCE OF CARDIAC PACEMAKER: ICD-10-CM

## 2019-07-31 DIAGNOSIS — I07.1 TRICUSPID VALVE INSUFFICIENCY, UNSPECIFIED ETIOLOGY: ICD-10-CM

## 2019-07-31 DIAGNOSIS — I34.0 NON-RHEUMATIC MITRAL REGURGITATION: ICD-10-CM

## 2019-07-31 PROBLEM — N18.30 CKD (CHRONIC KIDNEY DISEASE) STAGE 3, GFR 30-59 ML/MIN: Status: ACTIVE | Noted: 2019-07-31

## 2019-07-31 PROCEDURE — 93288 INTERROG EVL PM/LDLS PM IP: CPT | Performed by: INTERNAL MEDICINE

## 2019-07-31 PROCEDURE — 99213 OFFICE O/P EST LOW 20 MIN: CPT | Performed by: INTERNAL MEDICINE

## 2019-07-31 RX ORDER — POTASSIUM CHLORIDE 750 MG/1
20 TABLET, EXTENDED RELEASE ORAL DAILY
COMMUNITY
End: 2020-02-13 | Stop reason: ALTCHOICE

## 2019-07-31 RX ORDER — AZELASTINE 1 MG/ML
2 SPRAY, METERED NASAL 2 TIMES DAILY
COMMUNITY
End: 2021-02-09

## 2019-07-31 NOTE — PROGRESS NOTES
Subjective:     Encounter Date:07/31/2019      Patient ID: Meme Morales is a 74 y.o. female.    Chief Complaint:  Chief Complaint   Patient presents with   • Cardiac Valve Problem   • Congestive Heart Failure       HPI:  I had the pleasure of seeing Ms. Morales in the office today.  She is a 74-year-old female with a history of hypertension and hyperlipidemia.  She also has a history of sick sinus syndrome and is status post pacemaker placement.  She was recently found to have a cerebral aneurysm and underwent coiling.  She is doing well.  She has a history of chronic kidney disease as well as mitral tricuspid regurgitation.    Ms. Morales is in the office today.  She has occasional palpitations.  She has fleeting chest discomfort and occasional shortness of air.  Her symptoms have not changed over the past 6 months.  Interrogation of her pacemaker reveals appropriate function.    The following portions of the patient's history were reviewed and updated as appropriate: allergies, current medications, past family history, past medical history, past social history, past surgical history and problem list.    Problem List:  Patient Active Problem List   Diagnosis   • Cerebral aneurysm without rupture   • Hypertension   • Sick sinus syndrome due to SA node dysfunction (CMS/HCC)   • Cerebral aneurysm, nonruptured   • Other cerebrovascular disease   • CKD (chronic kidney disease) stage 3, GFR 30-59 ml/min (CMS/HCC)   • Tricuspid regurgitation   • Mitral valve regurgitation   • Presence of cardiac pacemaker       Past Medical History:  Past Medical History:   Diagnosis Date   • Acid reflux    • Arthritis    • Francisco esophagus    • Cancer (CMS/HCC)     non malignant skin cancers   • Cerebral aneurysm    • CHF (congestive heart failure) (CMS/HCC)    • Disease of thyroid gland     HISTORY OF NODULES, SURGERY.    • H/O TB skin testing     WITH POSITIVE RESULT. NEG CXR.   • Heart murmur    • Hyperlipidemia    •  Hypertension    • Hypothyroidism    • Leaky heart valve    • Osteopenia    • Pacemaker     IMPLANTED LEFT SIDE   • Pseudocholinesterase deficiency     AUNT ON FATHER'S SIDE OF FAMILY   • Pulmonary hypertension (CMS/HCC)    • RLS (restless legs syndrome)    • Sick sinus syndrome due to SA node dysfunction (CMS/HCC)     REASON FOR PACEMAKER   • Slow to wake up after anesthesia    • Stage 3 chronic kidney disease (CMS/HCC)        Past Surgical History:  Past Surgical History:   Procedure Laterality Date   • CATARACT EXTRACTION WITH INTRAOCULAR LENS IMPLANT      LEFT AND RIGHT   • CEREBRAL ANGIOGRAM N/A 2019    Procedure: CEREBRAL ANGIOGRAM;  Surgeon: Ranjit Key MD;  Location: [facility] OR ;  Service: Neurosurgery   • COLONOSCOPY     • CYST REMOVAL Right     REMOVED UNDER RIGHT ARM. BENIGN   • EMBOLIZATION CEREBRAL N/A 2018    Procedure: Cerebral angiography and embolization of cerebral aneurysm with possible Neuroform stent;  Surgeon: Ranjit Key MD;  Location: [facility] OR ;  Service: Neurosurgery   • HYSTERECTOMY     • PACEMAKER IMPLANTATION Left     [employer]   • PARATHYROID GLAND SURGERY      ONE GLAND RIGHT   • THYROIDECTOMY, PARTIAL Right    • TONSILLECTOMY AND ADENOIDECTOMY         Social History:  Social History     Socioeconomic History   • Marital status:      Spouse name: Not on file   • Number of children: Not on file   • Years of education: Not on file   • Highest education level: Not on file   Occupational History   • Occupation: retired   Tobacco Use   • Smoking status: Former Smoker     Packs/day: 1.50     Years: 25.00     Pack years: 37.50     Types: Cigarettes     Last attempt to quit:      Years since quittin.5   • Smokeless tobacco: Never Used   Substance and Sexual Activity   • Alcohol use: Yes     Comment: occasional   • Drug use: No   • Sexual activity: Defer       Allergies:  Allergies   Allergen Reactions   • Codeine Other (See  "Comments)     HYPOTENTION   • Morphine And Related Hallucinations           ROS:  Review of Systems   Cardiovascular: Positive for chest pain, dyspnea on exertion and palpitations.          Objective:         /62   Pulse 65   Ht 170.2 cm (67\")   Wt 77.1 kg (170 lb)   BMI 26.63 kg/m²     Physical Exam   Constitutional: She is oriented to person, place, and time. She appears well-developed and well-nourished. No distress.   HENT:   Head: Normocephalic and atraumatic.   Mouth/Throat: Oropharynx is clear and moist.   Eyes: Conjunctivae and EOM are normal. Pupils are equal, round, and reactive to light. No scleral icterus.   Neck: Normal range of motion. Neck supple. No thyromegaly present.   Cardiovascular: Normal rate, regular rhythm, S1 normal, S2 normal and intact distal pulses.  No extrasystoles are present. PMI is not displaced. Exam reveals no gallop, no S3, no S4, no friction rub and no decreased pulses.   Murmur ( There is a 1/6 to 2/6 systolic murmur at the left lower sternal border) heard.  Pulses:       Carotid pulses are 2+ on the right side, and 2+ on the left side.       Dorsalis pedis pulses are 2+ on the right side, and 2+ on the left side.        Posterior tibial pulses are 2+ on the right side, and 2+ on the left side.   Pulmonary/Chest: Effort normal and breath sounds normal. No respiratory distress. She has no wheezes. She has no rales.   Abdominal: Soft. Bowel sounds are normal. She exhibits no distension and no mass. There is no tenderness. There is no rebound and no guarding.   Musculoskeletal: Normal range of motion. She exhibits no edema.   Lymphadenopathy:     She has no cervical adenopathy.   Neurological: She is alert and oriented to person, place, and time. Coordination normal.   Skin: Skin is warm and dry. No rash noted. She is not diaphoretic. No pallor.   Psychiatric: She has a normal mood and affect. Her behavior is normal.   Nursing note and vitals reviewed.      In-Office " Procedure(s):  Procedures    ASCVD RIsk Score::  The ASCVD Risk score (Hurtsboropaulette BO Jr., et al., 2013) failed to calculate for the following reasons:    Cannot find a previous HDL lab    Cannot find a previous total cholesterol lab    Recent Radiology:  Imaging Results (most recent)     None          Lab Review:   not applicable           Invalid input(s): ALKPO4                        Invalid input(s): LDLCALC                  Problems Addressed this Visit        Cardiovascular and Mediastinum    Hypertension - Primary     Controlled         Tricuspid regurgitation     I will recheck an echocardiogram to assess her mitral and tricuspid insufficiency         Mitral valve regurgitation    Presence of cardiac pacemaker     Interrogation reveals normal function               Xochilt Conner MD  07/31/19  .

## 2019-08-01 DIAGNOSIS — I38 VALVULAR HEART DISEASE: Primary | ICD-10-CM

## 2019-08-13 ENCOUNTER — HOSPITAL ENCOUNTER (OUTPATIENT)
Dept: CARDIOLOGY | Facility: HOSPITAL | Age: 74
Discharge: HOME | End: 2019-08-13
Admitting: INTERNAL MEDICINE

## 2019-08-13 DIAGNOSIS — I38 VALVULAR HEART DISEASE: ICD-10-CM

## 2019-08-13 LAB
AORTIC DIMENSIONLESS INDEX: 0.7 (DI)
BH CV ECHO MEAS - ACS: 1.8 CM
BH CV ECHO MEAS - AO MAX PG: 8.4 MMHG
BH CV ECHO MEAS - AO MEAN PG (FULL): 2 MMHG
BH CV ECHO MEAS - AO MEAN PG: 4 MMHG
BH CV ECHO MEAS - AO ROOT AREA (BSA CORRECTED): 1.4
BH CV ECHO MEAS - AO ROOT AREA: 5.7 CM^2
BH CV ECHO MEAS - AO ROOT DIAM: 2.7 CM
BH CV ECHO MEAS - AO V2 MAX: 144.6 CM/SEC
BH CV ECHO MEAS - AO V2 MEAN: 96.4 CM/SEC
BH CV ECHO MEAS - AO V2 VTI: 34.3 CM
BH CV ECHO MEAS - AVA(I,A): 2 CM^2
BH CV ECHO MEAS - AVA(I,D): 2 CM^2
BH CV ECHO MEAS - BSA(HAYCOCK): 1.9 M^2
BH CV ECHO MEAS - BSA: 1.9 M^2
BH CV ECHO MEAS - BZI_BMI: 26.6 KILOGRAMS/M^2
BH CV ECHO MEAS - BZI_METRIC_HEIGHT: 170.2 CM
BH CV ECHO MEAS - BZI_METRIC_WEIGHT: 77.1 KG
BH CV ECHO MEAS - EDV(CUBED): 91.1 ML
BH CV ECHO MEAS - EDV(MOD-SP2): 73 ML
BH CV ECHO MEAS - EDV(MOD-SP4): 70 ML
BH CV ECHO MEAS - EDV(TEICH): 92.4 ML
BH CV ECHO MEAS - EF(CUBED): 75.9 %
BH CV ECHO MEAS - EF(MOD-BP): 63 %
BH CV ECHO MEAS - EF(MOD-SP2): 64.4 %
BH CV ECHO MEAS - EF(MOD-SP4): 61.4 %
BH CV ECHO MEAS - EF(TEICH): 68 %
BH CV ECHO MEAS - ESV(CUBED): 22 ML
BH CV ECHO MEAS - ESV(MOD-SP2): 26 ML
BH CV ECHO MEAS - ESV(MOD-SP4): 27 ML
BH CV ECHO MEAS - ESV(TEICH): 29.6 ML
BH CV ECHO MEAS - FS: 37.8 %
BH CV ECHO MEAS - IVS/LVPW: 0.7
BH CV ECHO MEAS - IVSD: 0.7 CM
BH CV ECHO MEAS - LAT PEAK E' VEL: 7.4 CM/SEC
BH CV ECHO MEAS - LV DIASTOLIC VOL/BSA (35-75): 37.1 ML/M^2
BH CV ECHO MEAS - LV MASS(C)D: 123.1 GRAMS
BH CV ECHO MEAS - LV MASS(C)DI: 65.2 GRAMS/M^2
BH CV ECHO MEAS - LV MAX PG: 4.2 MMHG
BH CV ECHO MEAS - LV MEAN PG: 2 MMHG
BH CV ECHO MEAS - LV SYSTOLIC VOL/BSA (12-30): 14.3 ML/M^2
BH CV ECHO MEAS - LV V1 MAX: 102.3 CM/SEC
BH CV ECHO MEAS - LV V1 MEAN: 66.8 CM/SEC
BH CV ECHO MEAS - LV V1 VTI: 23.7 CM
BH CV ECHO MEAS - LVIDD: 4.5 CM
BH CV ECHO MEAS - LVIDS: 2.8 CM
BH CV ECHO MEAS - LVLD AP2: 6.4 CM
BH CV ECHO MEAS - LVLD AP4: 6.5 CM
BH CV ECHO MEAS - LVLS AP2: 5.6 CM
BH CV ECHO MEAS - LVLS AP4: 5.3 CM
BH CV ECHO MEAS - LVOT AREA (M): 2.8 CM^2
BH CV ECHO MEAS - LVOT AREA: 2.8 CM^2
BH CV ECHO MEAS - LVOT DIAM: 1.9 CM
BH CV ECHO MEAS - LVPWD: 1 CM
BH CV ECHO MEAS - MED PEAK E' VEL: 8.01 CM/SEC
BH CV ECHO MEAS - MV A DUR: 0.16 SEC
BH CV ECHO MEAS - MV A MAX VEL: 105 CM/SEC
BH CV ECHO MEAS - MV DEC TIME: 127 SEC
BH CV ECHO MEAS - MV E MAX VEL: 83.9 CM/SEC
BH CV ECHO MEAS - MV E/A: 0.8
BH CV ECHO MEAS - PA ACC SLOPE: 1057 CM/SEC^2
BH CV ECHO MEAS - PA ACC TIME: 0.07 SEC
BH CV ECHO MEAS - PA MAX PG: 3.4 MMHG
BH CV ECHO MEAS - PA PR(ACCEL): 45.7 MMHG
BH CV ECHO MEAS - PA V2 MAX: 91.7 CM/SEC
BH CV ECHO MEAS - PI END-D VEL: 103 CM/SEC
BH CV ECHO MEAS - PULM A REVS DUR: 0.14 SEC
BH CV ECHO MEAS - PULM A REVS VEL: 30.3 CM/SEC
BH CV ECHO MEAS - PULM DIAS VEL: 42 CM/SEC
BH CV ECHO MEAS - PULM S/D: 2.2
BH CV ECHO MEAS - PULM SYS VEL: 91.4 CM/SEC
BH CV ECHO MEAS - RAP SYSTOLE: 8 MMHG
BH CV ECHO MEAS - RV MEAN PG: 1 MMHG
BH CV ECHO MEAS - RV V1 MEAN: 51.6 CM/SEC
BH CV ECHO MEAS - RV V1 VTI: 17.3 CM
BH CV ECHO MEAS - RVSP: 43 MMHG
BH CV ECHO MEAS - SI(AO): 104.1 ML/M^2
BH CV ECHO MEAS - SI(CUBED): 36.7 ML/M^2
BH CV ECHO MEAS - SI(LVOT): 35.6 ML/M^2
BH CV ECHO MEAS - SI(MOD-SP2): 24.9 ML/M^2
BH CV ECHO MEAS - SI(MOD-SP4): 22.8 ML/M^2
BH CV ECHO MEAS - SI(TEICH): 33.3 ML/M^2
BH CV ECHO MEAS - SV(AO): 196.4 ML
BH CV ECHO MEAS - SV(CUBED): 69.2 ML
BH CV ECHO MEAS - SV(LVOT): 67.2 ML
BH CV ECHO MEAS - SV(MOD-SP2): 47 ML
BH CV ECHO MEAS - SV(MOD-SP4): 43 ML
BH CV ECHO MEAS - SV(TEICH): 62.9 ML
BH CV ECHO MEAS - TAPSE (>1.6): 2.8 CM2
BH CV ECHO MEAS - TR MAX PG: 35
BH CV ECHO MEAS - TR MAX VEL: 296 CM/SEC
BH CV ECHO MEASUREMENTS AVERAGE E/E' RATIO: 10.89
BH CV VAS BP RIGHT ARM: NORMAL MMHG
BH CV XLRA - RV BASE: 3.9 CM
BH CV XLRA - TDI S': 11.7 CM/SEC
LEFT ATRIUM VOLUME INDEX: 46 ML/M2

## 2019-08-13 PROCEDURE — 93306 TTE W/DOPPLER COMPLETE: CPT | Performed by: INTERNAL MEDICINE

## 2019-08-13 PROCEDURE — 93306 TTE W/DOPPLER COMPLETE: CPT

## 2019-08-16 ENCOUNTER — TELEPHONE (OUTPATIENT)
Dept: NEUROSURGERY | Facility: CLINIC | Age: 74
End: 2019-08-16

## 2019-08-16 NOTE — TELEPHONE ENCOUNTER
Pt had surgery on 6/26 for cerebral angiogram.     Pt's dentist wants to know if he can use lidocaine 2 % with   1 to 100 k eppi      Pt is having a crown replaced. She is having this done in Sept.   Meme  [phone]

## 2019-10-23 VITALS
SYSTOLIC BLOOD PRESSURE: 155 MMHG | SYSTOLIC BLOOD PRESSURE: 116 MMHG | SYSTOLIC BLOOD PRESSURE: 113 MMHG | SYSTOLIC BLOOD PRESSURE: 136 MMHG | DIASTOLIC BLOOD PRESSURE: 75 MMHG | HEART RATE: 60 BPM | DIASTOLIC BLOOD PRESSURE: 67 MMHG | DIASTOLIC BLOOD PRESSURE: 55 MMHG | RESPIRATION RATE: 15 BRPM | HEART RATE: 63 BPM | HEART RATE: 65 BPM | DIASTOLIC BLOOD PRESSURE: 57 MMHG | RESPIRATION RATE: 16 BRPM | SYSTOLIC BLOOD PRESSURE: 157 MMHG | HEART RATE: 68 BPM | DIASTOLIC BLOOD PRESSURE: 73 MMHG | SYSTOLIC BLOOD PRESSURE: 124 MMHG | TEMPERATURE: 97.8 F | OXYGEN SATURATION: 100 % | DIASTOLIC BLOOD PRESSURE: 56 MMHG | RESPIRATION RATE: 14 BRPM | RESPIRATION RATE: 13 BRPM | OXYGEN SATURATION: 90 % | WEIGHT: 170 LBS | SYSTOLIC BLOOD PRESSURE: 142 MMHG | TEMPERATURE: 97.6 F | HEART RATE: 66 BPM | DIASTOLIC BLOOD PRESSURE: 69 MMHG | OXYGEN SATURATION: 88 % | OXYGEN SATURATION: 97 % | OXYGEN SATURATION: 99 % | HEIGHT: 68 IN

## 2019-10-28 ENCOUNTER — OFFICE (AMBULATORY)
Dept: URBAN - METROPOLITAN AREA PATHOLOGY 4 | Facility: PATHOLOGY | Age: 74
End: 2019-10-28
Payer: MEDICARE

## 2019-10-28 ENCOUNTER — AMBULATORY SURGICAL CENTER (AMBULATORY)
Dept: URBAN - METROPOLITAN AREA SURGERY 17 | Facility: SURGERY | Age: 74
End: 2019-10-28
Payer: MEDICARE

## 2019-10-28 DIAGNOSIS — K31.7 POLYP OF STOMACH AND DUODENUM: ICD-10-CM

## 2019-10-28 DIAGNOSIS — K22.70 BARRETT'S ESOPHAGUS WITHOUT DYSPLASIA: ICD-10-CM

## 2019-10-28 DIAGNOSIS — K44.9 DIAPHRAGMATIC HERNIA WITHOUT OBSTRUCTION OR GANGRENE: ICD-10-CM

## 2019-10-28 LAB
GI HISTOLOGY: A. SELECT: (no result)
GI HISTOLOGY: B. SELECT: (no result)
GI HISTOLOGY: C. SELECT: (no result)
GI HISTOLOGY: PDF REPORT: (no result)

## 2019-10-28 PROCEDURE — 43239 EGD BIOPSY SINGLE/MULTIPLE: CPT | Performed by: INTERNAL MEDICINE

## 2019-10-28 PROCEDURE — 88305 TISSUE EXAM BY PATHOLOGIST: CPT | Performed by: INTERNAL MEDICINE

## 2020-02-13 ENCOUNTER — OFFICE VISIT (OUTPATIENT)
Dept: CARDIOLOGY | Facility: CLINIC | Age: 75
End: 2020-02-13

## 2020-02-13 VITALS
SYSTOLIC BLOOD PRESSURE: 118 MMHG | BODY MASS INDEX: 28.41 KG/M2 | WEIGHT: 181 LBS | DIASTOLIC BLOOD PRESSURE: 72 MMHG | HEART RATE: 63 BPM | HEIGHT: 67 IN

## 2020-02-13 DIAGNOSIS — I34.0 NONRHEUMATIC MITRAL VALVE REGURGITATION: ICD-10-CM

## 2020-02-13 DIAGNOSIS — I07.1 TRICUSPID VALVE INSUFFICIENCY, UNSPECIFIED ETIOLOGY: ICD-10-CM

## 2020-02-13 DIAGNOSIS — I49.5 SICK SINUS SYNDROME DUE TO SA NODE DYSFUNCTION (HCC): ICD-10-CM

## 2020-02-13 DIAGNOSIS — I10 ESSENTIAL HYPERTENSION: Primary | ICD-10-CM

## 2020-02-13 DIAGNOSIS — Z95.0 PRESENCE OF CARDIAC PACEMAKER: ICD-10-CM

## 2020-02-13 PROCEDURE — 93288 INTERROG EVL PM/LDLS PM IP: CPT | Performed by: INTERNAL MEDICINE

## 2020-02-13 PROCEDURE — 99213 OFFICE O/P EST LOW 20 MIN: CPT | Performed by: INTERNAL MEDICINE

## 2020-02-13 RX ORDER — ATORVASTATIN CALCIUM 40 MG/1
1 TABLET, FILM COATED ORAL
COMMUNITY
Start: 2019-12-12 | End: 2020-11-02 | Stop reason: SDUPTHER

## 2020-02-13 RX ORDER — FLUNISOLIDE 0.25 MG/ML
SOLUTION NASAL
COMMUNITY
Start: 2020-02-03 | End: 2021-02-09

## 2020-02-13 NOTE — PROGRESS NOTES
Subjective:     Encounter Date:02/13/2020      Patient ID: Meme Morales is a 74 y.o. female.    Chief Complaint:  Chief Complaint   Patient presents with   • Cardiac Valve Problem   • Congestive Heart Failure       HPI:  History of Present Illness  I had pleasure of seeing Ms. Morales in the office today.  She is a pleasant 74-year-old female with a history of hypertension, hyperlipidemia sick sinus syndrome and is status post pacemaker placement.  She also has a history of a cerebral aneurysm and underwent coiling.  She has a history of chronic kidney disease as well as mild mitral and moderate tricuspid insufficiency.    Meme is in the office today for ongoing care regarding her cardiac issues.  Her blood pressure has been well controlled.  She is not complaining of chest discomfort.  She does have some mild shortness of air but she describes this as other people telling her she looks short of breath rather than her actually feeling dyspneic.  She does have some lower extremity edema and takes Lasix on a as needed basis.  She has not complained of palpitations, dizziness or near syncope.  She is a primary caregiver for her  who is paralyzed from his waist down.      The following portions of the patient's history were reviewed and updated as appropriate: allergies, current medications, past family history, past medical history, past social history, past surgical history and problem list.    Problem List:  Patient Active Problem List   Diagnosis   • Cerebral aneurysm without rupture   • Hypertension   • Sick sinus syndrome due to SA node dysfunction (CMS/HCC)   • Cerebral aneurysm, nonruptured   • Other cerebrovascular disease   • CKD (chronic kidney disease) stage 3, GFR 30-59 ml/min (CMS/HCC)   • Tricuspid regurgitation   • Mitral valve regurgitation   • Presence of cardiac pacemaker       Past Medical History:  Past Medical History:   Diagnosis Date   • Acid reflux    • Arthritis    • Francisco  esophagus    • Cancer (CMS/HCC)     non malignant skin cancers   • Cerebral aneurysm    • CHF (congestive heart failure) (CMS/HCC)    • Disease of thyroid gland     HISTORY OF NODULES, SURGERY.    • H/O TB skin testing     WITH POSITIVE RESULT. NEG CXR.   • Heart murmur    • Hyperlipidemia    • Hypertension    • Hypothyroidism    • Leaky heart valve    • Osteopenia    • Pacemaker     IMPLANTED LEFT SIDE   • Pseudocholinesterase deficiency     AUNT ON FATHER'S SIDE OF FAMILY   • Pulmonary hypertension (CMS/HCC)    • RLS (restless legs syndrome)    • Sick sinus syndrome due to SA node dysfunction (CMS/HCC)     REASON FOR PACEMAKER   • Slow to wake up after anesthesia    • Stage 3 chronic kidney disease (CMS/HCC)        Past Surgical History:  Past Surgical History:   Procedure Laterality Date   • CATARACT EXTRACTION WITH INTRAOCULAR LENS IMPLANT      LEFT AND RIGHT   • CEREBRAL ANGIOGRAM N/A 6/26/2019    Procedure: CEREBRAL ANGIOGRAM;  Surgeon: Ranjit Key MD;  Location: [facility] 18/19;  Service: Neurosurgery   • COLONOSCOPY     • CYST REMOVAL Right     REMOVED UNDER RIGHT ARM. BENIGN   • EMBOLIZATION CEREBRAL N/A 11/26/2018    Procedure: Cerebral angiography and embolization of cerebral aneurysm with possible Neuroform stent;  Surgeon: Ranjit Key MD;  Location: [facility] OR 18/19;  Service: Neurosurgery   • HYSTERECTOMY     • PACEMAKER IMPLANTATION Left     [employer]   • PARATHYROID GLAND SURGERY      ONE GLAND RIGHT   • THYROIDECTOMY, PARTIAL Right    • TONSILLECTOMY AND ADENOIDECTOMY         Social History:  Social History     Socioeconomic History   • Marital status:      Spouse name: Not on file   • Number of children: Not on file   • Years of education: Not on file   • Highest education level: Not on file   Occupational History   • Occupation: retired   Tobacco Use   • Smoking status: Former Smoker     Packs/day: 1.50     Years: 25.00     Pack years: 37.50     Types:  Cigarettes     Last attempt to quit:      Years since quittin.1   • Smokeless tobacco: Never Used   Substance and Sexual Activity   • Alcohol use: Yes     Comment: occasional   • Drug use: No   • Sexual activity: Defer       Allergies:  Allergies   Allergen Reactions   • Codeine Other (See Comments)     HYPOTENTION   • Morphine And Related Hallucinations       Immunizations:    There is no immunization history on file for this patient.    ROS:  Review of Systems   Constitution: Negative for chills, decreased appetite, fever, malaise/fatigue, weight gain and weight loss.   HENT: Negative for congestion, hoarse voice, nosebleeds and sore throat.    Eyes: Negative for blurred vision, double vision and visual disturbance.   Cardiovascular: Positive for leg swelling. Negative for chest pain, claudication, dyspnea on exertion, irregular heartbeat, near-syncope, orthopnea, palpitations, paroxysmal nocturnal dyspnea and syncope.   Respiratory: Positive for shortness of breath. Negative for cough, hemoptysis, sleep disturbances due to breathing, snoring, sputum production and wheezing.    Endocrine: Negative for cold intolerance, heat intolerance, polydipsia and polyuria.   Hematologic/Lymphatic: Negative for adenopathy and bleeding problem. Does not bruise/bleed easily.   Skin: Negative for flushing, itching, nail changes and rash.   Musculoskeletal: Positive for joint pain ( Right shoulder). Negative for arthritis, back pain, muscle cramps, muscle weakness, myalgias and neck pain.   Gastrointestinal: Negative for bloating, abdominal pain, anorexia, change in bowel habit, constipation, diarrhea, heartburn, hematemesis, hematochezia, jaundice, melena, nausea and vomiting.   Genitourinary: Negative for dysuria, hematuria and nocturia.   Neurological: Negative for brief paralysis, disturbances in coordination, excessive daytime sleepiness, dizziness, headaches, light-headedness, loss of balance, numbness,  "paresthesias, seizures and vertigo.   Psychiatric/Behavioral: Negative for altered mental status and depression. The patient is not nervous/anxious.    Allergic/Immunologic: Negative for environmental allergies and hives.          Objective:         /72   Pulse 63   Ht 170.2 cm (67\")   Wt 82.1 kg (181 lb)   BMI 28.35 kg/m²     Physical Exam   Constitutional: She is oriented to person, place, and time. She appears well-developed and well-nourished. No distress.   HENT:   Head: Normocephalic and atraumatic.   Mouth/Throat: Oropharynx is clear and moist.   Eyes: Pupils are equal, round, and reactive to light. Conjunctivae and EOM are normal. No scleral icterus.   Neck: Normal range of motion. Neck supple. No thyromegaly present.   Cardiovascular: Normal rate, regular rhythm, S1 normal, S2 normal and intact distal pulses.  No extrasystoles are present. PMI is not displaced. Exam reveals no gallop, no S3, no S4, no friction rub and no decreased pulses.   Murmur ( There is a 1/6 to 2/6 systolic murmur at the left lower sternal border) heard.  Pulses:       Carotid pulses are 2+ on the right side, and 2+ on the left side.       Radial pulses are 2+ on the right side, and 2+ on the left side.        Dorsalis pedis pulses are 2+ on the right side, and 2+ on the left side.        Posterior tibial pulses are 2+ on the right side, and 2+ on the left side.   Pulmonary/Chest: Effort normal and breath sounds normal. No respiratory distress. She has no wheezes. She has no rales.   Healed pacemaker incision   Abdominal: Soft. Bowel sounds are normal. She exhibits no distension and no mass. There is no tenderness. There is no rebound and no guarding.   Musculoskeletal: Normal range of motion. She exhibits edema ( Mild bilateral lower extremity edema).   Lymphadenopathy:     She has no cervical adenopathy.   Neurological: She is alert and oriented to person, place, and time. Coordination normal.   Skin: Skin is warm and dry. " No rash noted. She is not diaphoretic. No pallor.   Psychiatric: She has a normal mood and affect. Her behavior is normal.   Nursing note and vitals reviewed.      In-Office Procedure(s):  Procedures    ASCVD RIsk Score::  The ASCVD Risk score (Ramakrishnapaulette BO Jr., et al., 2013) failed to calculate for the following reasons:    The patient has a prior MI or stroke diagnosis    Recent Radiology:  Imaging Results (Most Recent)     None          Lab Review:   not applicable             Assessment:          Diagnosis Plan   1. Essential hypertension     2. Sick sinus syndrome due to SA node dysfunction (CMS/HCC)     3. Presence of cardiac pacemaker     4. Nonrheumatic mitral valve regurgitation     5. Tricuspid valve insufficiency, unspecified etiology            Plan:      1.  Essential hypertension  Controlled    2.  Status post pacemaker placement secondary to SA node dysfunction  Interrogation of her device today revealed normal function.  She has 7-1/2 years remaining on her battery.  She did have one episode of PSVT with a duration of approximately 14 seconds.  She was asymptomatic.    3.  Mild valvular heart disease  I did review her echocardiogram with her from August 2019.  Her left ventricular function was normal.  She had mild mitral insufficiency and moderate tricuspid insufficiency.    4.  Dyslipidemia.  She is on atorvastatin.  She will have labs obtained with Dr. Price in the next month.      Level of Care:                 Xochilt Conner MD  02/13/20  .

## 2020-08-11 ENCOUNTER — OFFICE VISIT (OUTPATIENT)
Dept: CARDIOLOGY | Facility: CLINIC | Age: 75
End: 2020-08-11

## 2020-08-11 VITALS
BODY MASS INDEX: 28.72 KG/M2 | HEIGHT: 67 IN | HEART RATE: 60 BPM | SYSTOLIC BLOOD PRESSURE: 142 MMHG | WEIGHT: 183 LBS | DIASTOLIC BLOOD PRESSURE: 64 MMHG

## 2020-08-11 DIAGNOSIS — Z95.0 PRESENCE OF CARDIAC PACEMAKER: ICD-10-CM

## 2020-08-11 DIAGNOSIS — R09.89 RIGHT CAROTID BRUIT: ICD-10-CM

## 2020-08-11 DIAGNOSIS — I10 ESSENTIAL HYPERTENSION: ICD-10-CM

## 2020-08-11 DIAGNOSIS — E78.5 DYSLIPIDEMIA: Primary | ICD-10-CM

## 2020-08-11 PROBLEM — Q76.0 THORACIC OUTLET SYNDROME ASSOCIATED WITH CERVICAL RIB: Status: ACTIVE | Noted: 2020-08-11

## 2020-08-11 PROBLEM — Q76.5 THORACIC OUTLET SYNDROME ASSOCIATED WITH CERVICAL RIB: Status: ACTIVE | Noted: 2020-08-11

## 2020-08-11 PROBLEM — G54.0 THORACIC OUTLET SYNDROME ASSOCIATED WITH CERVICAL RIB: Status: ACTIVE | Noted: 2020-08-11

## 2020-08-11 PROCEDURE — 99214 OFFICE O/P EST MOD 30 MIN: CPT | Performed by: INTERNAL MEDICINE

## 2020-08-11 RX ORDER — LISINOPRIL 10 MG/1
10 TABLET ORAL DAILY
Qty: 30 TABLET | Refills: 11 | Status: SHIPPED | OUTPATIENT
Start: 2020-08-11 | End: 2020-10-22 | Stop reason: SDUPTHER

## 2020-08-11 NOTE — PROGRESS NOTES
Subjective:     Encounter Date:08/11/2020      Patient ID: Meme Morales is a 75 y.o. female.    Chief Complaint:  Chief Complaint   Patient presents with   • Cardiac Valve Problem   • Congestive Heart Failure       HPI:  History of Present Illness     I saw Ms. Morales in the office today. She is a pleasant 75-year-old female with a history of hypertension, hyperlipidemia sick sinus syndrome and is status post pacemaker placement.  She also has a history of a cerebral aneurysm and underwent coiling.  She has a history of chronic kidney disease as well as mild mitral and moderate tricuspid insufficiency.  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  She has occasional right arm numbness.    Ms. Morales is complaining of dyspnea with exertion primarily when running up steps.  She does note lower extremity edema intermittently and takes Lasix a couple times a week.  She states edema is usually resolved by morning.  She also has occasional palpitations but feels that these are not as frequent as they have been in the past.  No chest discomfort.  Her blood pressure is elevated in the office today.  I did recheck after she been here for a while and it was 160/80.  She has not been checking her blood pressure at home.  She does complain of some right arm numbness but relates this to her thoracic outlet syndrome.  She also has some right thigh numbness but feels like she has a pinched nerve.  She is not followed up with surgery after her coiling and plans to do so this year    The following portions of the patient's history were reviewed and updated as appropriate: allergies, current medications, past family history, past medical history, past social history, past surgical history and problem list.    Problem List:  Patient Active Problem List   Diagnosis   • Cerebral aneurysm without rupture   • Hypertension   • Sick sinus syndrome due to SA node dysfunction (CMS/HCC)   • Cerebral aneurysm,  nonruptured   • Other cerebrovascular disease   • CKD (chronic kidney disease) stage 3, GFR 30-59 ml/min (CMS/HCC)   • Tricuspid regurgitation   • Mitral valve regurgitation   • Presence of cardiac pacemaker   • Right carotid bruit   • Thoracic outlet syndrome associated with cervical rib   • Dyslipidemia       Past Medical History:  Past Medical History:   Diagnosis Date   • Acid reflux    • Arthritis    • Francisco esophagus    • Cancer (CMS/HCC)     non malignant skin cancers   • Cerebral aneurysm    • CHF (congestive heart failure) (CMS/HCC)    • Disease of thyroid gland     HISTORY OF NODULES, SURGERY.    • H/O TB skin testing     WITH POSITIVE RESULT. NEG CXR.   • Heart murmur    • Hyperlipidemia    • Hypertension    • Hypothyroidism    • Leaky heart valve    • Osteopenia    • Pacemaker     IMPLANTED LEFT SIDE   • Pseudocholinesterase deficiency     AUNT ON FATHER'S SIDE OF FAMILY   • Pulmonary hypertension (CMS/HCC)    • RLS (restless legs syndrome)    • Sick sinus syndrome due to SA node dysfunction (CMS/HCC)     REASON FOR PACEMAKER   • Slow to wake up after anesthesia    • Stage 3 chronic kidney disease (CMS/HCC)        Past Surgical History:  Past Surgical History:   Procedure Laterality Date   • CATARACT EXTRACTION WITH INTRAOCULAR LENS IMPLANT      LEFT AND RIGHT   • CEREBRAL ANGIOGRAM N/A 6/26/2019    Procedure: CEREBRAL ANGIOGRAM;  Surgeon: Ranjit Key MD;  Location: [facility] 18/19;  Service: Neurosurgery   • COLONOSCOPY     • CYST REMOVAL Right     REMOVED UNDER RIGHT ARM. BENIGN   • EMBOLIZATION CEREBRAL N/A 11/26/2018    Procedure: Cerebral angiography and embolization of cerebral aneurysm with possible Neuroform stent;  Surgeon: Ranjit Key MD;  Location: [facility] OR 18/19;  Service: Neurosurgery   • HYSTERECTOMY     • PACEMAKER IMPLANTATION Left     [employer]   • PARATHYROID GLAND SURGERY      ONE GLAND RIGHT   • THYROIDECTOMY, PARTIAL Right    • TONSILLECTOMY AND  ADENOIDECTOMY         Social History:  Social History     Socioeconomic History   • Marital status:      Spouse name: Not on file   • Number of children: Not on file   • Years of education: Not on file   • Highest education level: Not on file   Occupational History   • Occupation: retired   Tobacco Use   • Smoking status: Former Smoker     Packs/day: 1.50     Years: 25.00     Pack years: 37.50     Types: Cigarettes     Last attempt to quit:      Years since quittin.6   • Smokeless tobacco: Never Used   Substance and Sexual Activity   • Alcohol use: Yes     Comment: occasional   • Drug use: No   • Sexual activity: Defer       Allergies:  Allergies   Allergen Reactions   • Codeine Other (See Comments)     HYPOTENTION   • Morphine And Related Hallucinations       Immunizations:    There is no immunization history on file for this patient.    ROS:  Review of Systems   Constitution: Negative for chills, decreased appetite, fever, malaise/fatigue, weight gain and weight loss.   HENT: Negative for congestion, hoarse voice, nosebleeds and sore throat.    Eyes: Negative for blurred vision, double vision and visual disturbance.   Cardiovascular: Negative for chest pain, claudication, dyspnea on exertion, irregular heartbeat, leg swelling, near-syncope, orthopnea, palpitations, paroxysmal nocturnal dyspnea and syncope.   Respiratory: Negative for cough, hemoptysis, shortness of breath, sleep disturbances due to breathing, snoring, sputum production and wheezing.    Endocrine: Negative for cold intolerance, heat intolerance, polydipsia and polyuria.   Hematologic/Lymphatic: Negative for adenopathy and bleeding problem. Does not bruise/bleed easily.   Skin: Negative for flushing, itching, nail changes and rash.   Musculoskeletal: Negative for arthritis, back pain, joint pain, muscle cramps, muscle weakness, myalgias and neck pain.   Gastrointestinal: Negative for bloating, abdominal pain, anorexia, change in  "bowel habit, constipation, diarrhea, heartburn, hematemesis, hematochezia, jaundice, melena, nausea and vomiting.   Genitourinary: Negative for dysuria, hematuria and nocturia.   Neurological: Negative for brief paralysis, disturbances in coordination, excessive daytime sleepiness, dizziness, headaches, light-headedness, loss of balance, numbness, paresthesias, seizures and vertigo.   Psychiatric/Behavioral: Negative for altered mental status and depression. The patient is not nervous/anxious.    Allergic/Immunologic: Negative for environmental allergies and hives.          Objective:         /64   Pulse 60   Ht 170.2 cm (67\")   Wt 83 kg (183 lb)   BMI 28.66 kg/m²     Physical Exam   Constitutional: She is oriented to person, place, and time. She appears well-developed and well-nourished. No distress.   HENT:   Head: Normocephalic and atraumatic.   Mouth/Throat: Oropharynx is clear and moist.   Eyes: Pupils are equal, round, and reactive to light. Conjunctivae and EOM are normal. No scleral icterus.   Neck: Normal range of motion. Neck supple. No thyromegaly present.   Cardiovascular: Normal rate, regular rhythm, S1 normal, S2 normal and intact distal pulses.  No extrasystoles are present. PMI is not displaced. Exam reveals no gallop, no S3, no S4, no friction rub and no decreased pulses.   Murmur ( There is a 1/6 to 2/6 systolic murmur heard at the left lower sternal border) heard.  Pulses:       Carotid pulses are 2+ on the right side with bruit, and 2+ on the left side.       Dorsalis pedis pulses are 2+ on the right side, and 2+ on the left side.        Posterior tibial pulses are 2+ on the right side, and 2+ on the left side.   Pulmonary/Chest: Effort normal and breath sounds normal. No respiratory distress. She has no wheezes. She has no rales.   Abdominal: Soft. Bowel sounds are normal. She exhibits no distension and no mass. There is no tenderness. There is no rebound and no guarding. "   Musculoskeletal: Normal range of motion. She exhibits edema ( Trivial bilateral lower extremity edema).   Lymphadenopathy:     She has no cervical adenopathy.   Neurological: She is alert and oriented to person, place, and time. Coordination normal.   Skin: Skin is warm and dry. No rash noted. She is not diaphoretic. No pallor.   Psychiatric: She has a normal mood and affect. Her behavior is normal.   Nursing note and vitals reviewed.      In-Office Procedure(s):  Procedures    ASCVD RIsk Score::  The ASCVD Risk score ([place]paulette BO Jr., et al., 2013) failed to calculate for the following reasons:    The patient has a prior MI or stroke diagnosis    Recent Radiology:  Imaging Results (Most Recent)     None          Lab Review:   not applicable             Assessment:          Diagnosis Plan   1. Essential hypertension  lisinopril (PRINIVIL,ZESTRIL) 10 MG tablet    Basic Metabolic Panel   2. Presence of cardiac pacemaker     3. Right carotid bruit  US Carotid Bilateral   4. Dyslipidemia            Plan:      1.  Essential hypertension  Her blood pressure is not adequately controlled.  I am going to add lisinopril 10 mg daily to her medical regimen.  She will continue to monitor her blood pressure and call me in a month with her readings.  I will see her back in the office in approximately 4 months.  She should have a BMP in approximately 2 weeks.    2.  Dual-chamber pacemaker  Pacemaker was interrogated in June 2020.  It demonstrated normal function.  She has 7 years remaining on her battery.  She was pacing at 72% in the atrium 10% of the ventricle.  No atrial fibrillation    3.  Right carotid bruit  This sounds actually more like a thrill.  It may be related to her thoracic outlet syndrome but I am going to check a carotid Doppler to be sure.    4.  Dyslipidemia  She is on atorvastatin.  I do not see a recent lipid profile.  I will have this obtained when she has her BMP      Level of Care:                 Xochilt  MD Ubaldo  08/11/20  .

## 2020-08-18 ENCOUNTER — RESULTS ENCOUNTER (OUTPATIENT)
Dept: CARDIOLOGY | Facility: CLINIC | Age: 75
End: 2020-08-18

## 2020-08-18 DIAGNOSIS — I10 ESSENTIAL HYPERTENSION: ICD-10-CM

## 2020-08-18 DIAGNOSIS — E78.5 DYSLIPIDEMIA: ICD-10-CM

## 2020-08-24 DIAGNOSIS — I65.23 BILATERAL CAROTID ARTERY STENOSIS: Primary | ICD-10-CM

## 2020-08-28 ENCOUNTER — HOSPITAL ENCOUNTER (OUTPATIENT)
Dept: CARDIOLOGY | Facility: HOSPITAL | Age: 75
Discharge: HOME | End: 2020-08-28
Admitting: INTERNAL MEDICINE

## 2020-08-28 DIAGNOSIS — I65.23 BILATERAL CAROTID ARTERY STENOSIS: ICD-10-CM

## 2020-08-28 LAB
BH CV XLRA MEAS LEFT DIST CCA EDV: 17.6 CM/SEC
BH CV XLRA MEAS LEFT DIST CCA PSV: 50.8 CM/SEC
BH CV XLRA MEAS LEFT DIST ICA EDV: -27.5 CM/SEC
BH CV XLRA MEAS LEFT DIST ICA PSV: -86 CM/SEC
BH CV XLRA MEAS LEFT ICA/CCA RATIO: 1.69
BH CV XLRA MEAS LEFT MID ICA EDV: -22.6 CM/SEC
BH CV XLRA MEAS LEFT MID ICA PSV: -62.7 CM/SEC
BH CV XLRA MEAS LEFT PROX CCA EDV: 11 CM/SEC
BH CV XLRA MEAS LEFT PROX CCA PSV: 47.4 CM/SEC
BH CV XLRA MEAS LEFT PROX ECA EDV: -6.5 CM/SEC
BH CV XLRA MEAS LEFT PROX ECA PSV: -49.5 CM/SEC
BH CV XLRA MEAS LEFT PROX ICA EDV: -16.2 CM/SEC
BH CV XLRA MEAS LEFT PROX ICA PSV: -50.1 CM/SEC
BH CV XLRA MEAS LEFT PROX SCLA EDV: 94.6 CM/SEC
BH CV XLRA MEAS LEFT PROX SCLA PSV: 112 CM/SEC
BH CV XLRA MEAS LEFT VERTEBRAL A EDV: 12.7 CM/SEC
BH CV XLRA MEAS LEFT VERTEBRAL A PSV: 58 CM/SEC
BH CV XLRA MEAS RIGHT DIST CCA EDV: 14.3 CM/SEC
BH CV XLRA MEAS RIGHT DIST CCA PSV: 70.8 CM/SEC
BH CV XLRA MEAS RIGHT DIST ICA EDV: 22.3 CM/SEC
BH CV XLRA MEAS RIGHT DIST ICA PSV: 79.2 CM/SEC
BH CV XLRA MEAS RIGHT ICA/CCA RATIO: 1.44
BH CV XLRA MEAS RIGHT MID ICA EDV: 23.5 CM/SEC
BH CV XLRA MEAS RIGHT MID ICA PSV: 98.8 CM/SEC
BH CV XLRA MEAS RIGHT PROX CCA EDV: -9.3 CM/SEC
BH CV XLRA MEAS RIGHT PROX CCA PSV: -64.6 CM/SEC
BH CV XLRA MEAS RIGHT PROX ECA EDV: -11.2 CM/SEC
BH CV XLRA MEAS RIGHT PROX ECA PSV: -80.1 CM/SEC
BH CV XLRA MEAS RIGHT PROX ICA EDV: -25.2 CM/SEC
BH CV XLRA MEAS RIGHT PROX ICA PSV: -102.6 CM/SEC
BH CV XLRA MEAS RIGHT PROX SCLA EDV: 16 CM/SEC
BH CV XLRA MEAS RIGHT PROX SCLA PSV: 97.4 CM/SEC
BH CV XLRA MEAS RIGHT VERTEBRAL A EDV: 11.5 CM/SEC
BH CV XLRA MEAS RIGHT VERTEBRAL A PSV: 42.2 CM/SEC
LEFT ARM BP: NORMAL MMHG
RIGHT ARM BP: NORMAL MMHG

## 2020-08-28 PROCEDURE — 93880 EXTRACRANIAL BILAT STUDY: CPT

## 2020-09-02 ENCOUNTER — TELEPHONE (OUTPATIENT)
Dept: INTERNAL MEDICINE | Facility: CLINIC | Age: 75
End: 2020-09-02

## 2020-09-02 DIAGNOSIS — I10 ESSENTIAL HYPERTENSION: Primary | ICD-10-CM

## 2020-09-02 NOTE — TELEPHONE ENCOUNTER
20-Dr Xochilt Conner  Pt: Meme Andrew   1945  Reason for Call: Pt. Needs to talk to Dr Lemon regarding her B/P and Lisinopril. States that B/P is running low especially in am. She is concerned.  Home # [phone]  Cell .   Thank you

## 2020-09-02 NOTE — TELEPHONE ENCOUNTER
Patient called.      Dr. Xochilt Conner would like a BMP done.    Patient was told that Dr. Price could order this.    You are PCP.    If ok, can call patient back to schedule Lab.    Thank You

## 2020-09-05 LAB
BUN SERPL-MCNC: 25 MG/DL (ref 8–27)
BUN/CREAT SERPL: 21 (ref 12–28)
CALCIUM SERPL-MCNC: 10 MG/DL (ref 8.7–10.3)
CHLORIDE SERPL-SCNC: 98 MMOL/L (ref 96–106)
CO2 SERPL-SCNC: 23 MMOL/L (ref 20–29)
CREAT SERPL-MCNC: 1.19 MG/DL (ref 0.57–1)
GLUCOSE SERPL-MCNC: 92 MG/DL (ref 65–99)
POTASSIUM SERPL-SCNC: 4.9 MMOL/L (ref 3.5–5.2)
SODIUM SERPL-SCNC: 139 MMOL/L (ref 134–144)

## 2020-09-07 ENCOUNTER — RESULTS ENCOUNTER (OUTPATIENT)
Dept: CARDIOLOGY | Facility: CLINIC | Age: 75
End: 2020-09-07

## 2020-09-07 DIAGNOSIS — I10 ESSENTIAL HYPERTENSION: ICD-10-CM

## 2020-09-14 ENCOUNTER — TELEPHONE (OUTPATIENT)
Dept: CARDIOLOGY | Facility: CLINIC | Age: 75
End: 2020-09-14

## 2020-09-14 NOTE — TELEPHONE ENCOUNTER
20-Dr Xochilt Conner  Pt. Meme PHILLIPS-1945  Phone: [phone]  Reason for Call: Calling regarding discussing results of Lab work. Please call.  Thank you

## 2020-09-28 ENCOUNTER — TELEPHONE (OUTPATIENT)
Dept: INTERNAL MEDICINE | Facility: CLINIC | Age: 75
End: 2020-09-28

## 2020-09-28 NOTE — TELEPHONE ENCOUNTER
Patient came in to request an appointment for a possible UTI.    Patient come anytime.    Thank You

## 2020-10-05 ENCOUNTER — OFFICE VISIT (OUTPATIENT)
Dept: INTERNAL MEDICINE | Facility: CLINIC | Age: 75
End: 2020-10-05

## 2020-10-05 ENCOUNTER — TELEPHONE (OUTPATIENT)
Dept: INTERNAL MEDICINE | Facility: CLINIC | Age: 75
End: 2020-10-05

## 2020-10-05 VITALS
WEIGHT: 182 LBS | OXYGEN SATURATION: 97 % | HEART RATE: 60 BPM | BODY MASS INDEX: 28.56 KG/M2 | RESPIRATION RATE: 15 BRPM | DIASTOLIC BLOOD PRESSURE: 82 MMHG | HEIGHT: 67 IN | SYSTOLIC BLOOD PRESSURE: 130 MMHG | TEMPERATURE: 97.7 F

## 2020-10-05 DIAGNOSIS — R31.9 URINARY TRACT INFECTION WITH HEMATURIA, SITE UNSPECIFIED: ICD-10-CM

## 2020-10-05 DIAGNOSIS — N39.0 URINARY TRACT INFECTION WITHOUT HEMATURIA, SITE UNSPECIFIED: Primary | ICD-10-CM

## 2020-10-05 DIAGNOSIS — N39.0 URINARY TRACT INFECTION WITH HEMATURIA, SITE UNSPECIFIED: ICD-10-CM

## 2020-10-05 LAB
BILIRUB BLD-MCNC: NEGATIVE MG/DL
CLARITY, POC: ABNORMAL
COLOR UR: YELLOW
GLUCOSE UR STRIP-MCNC: NEGATIVE MG/DL
KETONES UR QL: NEGATIVE
LEUKOCYTE EST, POC: ABNORMAL
NITRITE UR-MCNC: NEGATIVE MG/ML
PH UR: 5 [PH] (ref 5–8)
PROT UR STRIP-MCNC: NEGATIVE MG/DL
RBC # UR STRIP: ABNORMAL /UL
SP GR UR: 1.01 (ref 1–1.03)
UROBILINOGEN UR QL: NORMAL

## 2020-10-05 PROCEDURE — 99213 OFFICE O/P EST LOW 20 MIN: CPT | Performed by: INTERNAL MEDICINE

## 2020-10-05 PROCEDURE — 81003 URINALYSIS AUTO W/O SCOPE: CPT | Performed by: INTERNAL MEDICINE

## 2020-10-05 RX ORDER — SULFAMETHOXAZOLE AND TRIMETHOPRIM 800; 160 MG/1; MG/1
1 TABLET ORAL 2 TIMES DAILY
Qty: 14 TABLET | Refills: 0 | Status: SHIPPED | OUTPATIENT
Start: 2020-10-05 | End: 2020-10-12

## 2020-10-05 NOTE — TELEPHONE ENCOUNTER
Patient called and is wanting an appointment.    Patient has been battling an UTI for 2 weeks.    Still having problems.    Thank You

## 2020-10-05 NOTE — PROGRESS NOTES
Subjective   Meme Morales is a 75 y.o. female.     Chief Complaint   Patient presents with   • Urinary Tract Infection       History of Present Illness   This is a 75-year-old female with a past medical history significant for benign essential hypertension, hyperlipidemia, chronic kidney disease stage III, history of mitral valve regurgitation, tricuspid regurgitation, history of cerebral aneurysm, history of sick sinus syndrome status post pacemaker who presents today for an acute visit.  The patient presents with a 2-week history of frequency and urgency as well as some dysuria which is mostly been occurring at night.  The urine has been cloudy, symptoms have been intermittent over the last 2 weeks.  The patient states she did go to the [facility] ([place]) where she was treated with 3 days of Keflex, she was subsequently told that the culture was returned and the antibiotic did appropriately cover the bacteria.  Symptoms were only resolved for approximately 1 to 2 days when they then again returned.  She has not had any fevers, she has had a few chills, no low back pain and no obvious hematuria, she has had some suprapubic tenderness.  The patient also reports that she has had some neck discomfort intermittently as well and was unsure if this could be related.  No other new voiced complaints.    The following portions of the patient's history were reviewed and updated as appropriate: allergies, current medications, past family history, past medical history, past social history, past surgical history and problem list.    Depression Screen:  No flowsheet data found.    Assessment/Plan   There are no diagnoses linked to this encounter.     #1.  Acute cystitis-urinalysis was completed and was consistent with an infection, will treat with Bactrim DS 1 tablet p.o. twice daily x7 days and send the urine out for a culture and sensitivity.    Past Medical History:   Diagnosis Date   • Acid reflux    • Arthritis    •  Arthritis of carpometacarpal (CMC) joint of right thumb    • Francisco esophagus    • Basal cell carcinoma    • Brain aneurysm    • Burning with urination    • Cancer (CMS/HCC)     non malignant skin cancers   • Cerebral aneurysm    • Congestive heart failure (CMS/HCC)    • Depression    • Difficulty swallowing    • Disease of thyroid gland     HISTORY OF NODULES, SURGERY.    • Encounter for Medicare annual wellness exam    • Esophageal reflux    • Exposure to chickenpox    • H/O TB skin testing     WITH POSITIVE RESULT. NEG CXR.   • Heart murmur    • History of hyperparathyroidism    • History of migraine headaches    • Hoarseness    • Hypercalcemia    • Hyperlipidemia    • Hypertension    • Hypothyroidism    • Leaky heart valve    • Low back pain    • Osteopenia with high risk of fracture    • Overweight with body mass index (BMI) of 25 to 25.9 in adult    • Pacemaker     IMPLANTED LEFT SIDE   • Palpitations    • Pseudocholinesterase deficiency     AUNT ON FATHER'S SIDE OF FAMILY   • Pulmonary hypertension (CMS/HCC)    • Rheumatic multiple valve disease    • RLS (restless legs syndrome)    • Rubella    • Seasonal allergies    • Sick sinus syndrome due to SA node dysfunction (CMS/HCC)     REASON FOR PACEMAKER   • Skin disorder    • Slow to wake up after anesthesia    • Stage 3 chronic kidney disease (CMS/HCC)    • Symptoms of urinary tract infection    • Thoracic outlet syndrome    • Thyroid disease    • Tinea cruris    • Tricuspid valve regurgitation    • Urinary hesitancy    • Valvular heart disease    • Vitamin D deficiency        Past Surgical History:   Procedure Laterality Date   • CARDIAC CATHETERIZATION      Post-Proc Data:  Lesions successfully dilated    • CATARACT EXTRACTION WITH INTRAOCULAR LENS IMPLANT      LEFT AND RIGHT   • CEREBRAL ANGIOGRAM N/A 6/26/2019    Procedure: CEREBRAL ANGIOGRAM;  Surgeon: Ranjit Key MD;  Location: [facility] OR 18/19;  Service: Neurosurgery   • COLONOSCOPY     •  CYST REMOVAL Right     REMOVED UNDER RIGHT ARM. BENIGN   • EMBOLIZATION CEREBRAL N/A 2018    Procedure: Cerebral angiography and embolization of cerebral aneurysm with possible Neuroform stent;  Surgeon: Ranjit Key MD;  Location: [facility] OR ;  Service: Neurosurgery   • HYSTERECTOMY     • PACEMAKER IMPLANTATION Left     [employer]   • PARATHYROID GLAND SURGERY      ONE GLAND RIGHT   • THYROIDECTOMY, PARTIAL Right    • TONSILLECTOMY AND ADENOIDECTOMY     • UPPER GASTROINTESTINAL ENDOSCOPY         Family History   Problem Relation Age of Onset   • Heart disease Father    • Stroke Father    • Heart attack Father    • Hypertension Father    • Stroke Maternal Grandmother    • Hypertension Mother    • Hypertension Sister    • Malig Hyperthermia Neg Hx        Social History     Socioeconomic History   • Marital status:      Spouse name: Not on file   • Number of children: Not on file   • Years of education: Not on file   • Highest education level: Not on file   Occupational History   • Occupation: retired   Tobacco Use   • Smoking status: Former Smoker     Packs/day: 1.50     Years: 25.00     Pack years: 37.50     Types: Cigarettes     Quit date:      Years since quittin.7   • Smokeless tobacco: Never Used   Substance and Sexual Activity   • Alcohol use: Yes     Comment: occasional   • Drug use: No   • Sexual activity: Defer       Current Outpatient Medications   Medication Sig Dispense Refill   • acetaminophen (TYLENOL) 500 MG tablet Take 1,000 mg by mouth Every 6 (Six) Hours As Needed for Mild Pain  or Fever.     • amLODIPine (NORVASC) 5 MG tablet Take 5 mg by mouth Daily.     • aspirin  MG tablet Take 1 tablet by mouth Daily. (Patient taking differently: Take 325 mg by mouth Daily. PT HAS APPT TODAY TO ASK WHEN TO HOLD FOR PROCEDURE) 30 tablet 6   • atorvastatin (LIPITOR) 40 MG tablet Take 1 tablet by mouth every night at bedtime.     • azelastine (ASTELIN) 0.1 %  "nasal spray 2 sprays into the nostril(s) as directed by provider 2 (Two) Times a Day. Use in each nostril as directed     • calcium polycarbophil (FIBERCON) 625 MG tablet Take 1,250 mg by mouth Daily.     • Cholecalciferol (VITAMIN D3) 2000 units tablet Take 2,000 Units by mouth Daily.     • denosumab (PROLIA) 60 MG/ML solution prefilled syringe syringe Inject 60 mg under the skin into the appropriate area as directed 1 (One) Time.     • flunisolide (NASALIDE) 25 MCG/ACT (0.025%) solution nasal spray      • furosemide (LASIX) 40 MG tablet Take 40 mg by mouth Daily As Needed (WITH SWELLING).     • levothyroxine (SYNTHROID, LEVOTHROID) 75 MCG tablet Take 75 mcg by mouth Daily.     • lisinopril (PRINIVIL,ZESTRIL) 10 MG tablet Take 1 tablet by mouth Daily. 30 tablet 11   • montelukast (SINGULAIR) 10 MG tablet Take 10 mg by mouth Daily.     • pantoprazole (PROTONIX) 40 MG EC tablet Take 40 mg by mouth Daily.     • rOPINIRole (REQUIP) 1 MG tablet Take 1 mg by mouth 2 (Two) Times a Day. TAKES IN AFTERNOON AND AT BEDTIME     • vitamin C (ASCORBIC ACID) 500 MG tablet Take 500 mg by mouth Daily.       No current facility-administered medications for this visit.        Review of Systems    Objective   /82 (BP Location: Left arm, Patient Position: Sitting, Cuff Size: Adult)   Pulse 60   Temp 97.7 °F (36.5 °C) (Temporal)   Resp 15   Ht 170.2 cm (67.01\")   Wt 82.6 kg (182 lb)   SpO2 97%   BMI 28.50 kg/m²     Physical Exam  Constitutional:  Patient appears well-developed, well-nourished and in no acute distress.  Neck:  The neck is supple and symmetric.  The trachea is midline with no masses.  Exam of the thyroid reveals no thyromegaly or masses.  Pulmonary:  Respiratory effort is normal with no increased work of breathing or respiratory distress.  Lungs are clear to auscultation bilaterally.  Cardiovascular:  Auscultation of the heart rate and rhythm is normal.  S1 and S2 are normal without rubs, gallops or murmurs.  " Carotid pulses are 2+ bilaterally without bruit.  Examination of the extremities reveals no edema.  Abdomen: Positive suprapubic tenderness  Neurologic:  Cranial nerves II-XII are grossly intact.  Back: No CVA tenderness was noted.  Psychiatric:  Patient's judgment and insight are unimpaired.  Patient's mood and affect are normal.      Recent Results (from the past 2016 hour(s))   Duplex Carotid Ultrasound CAR    Collection Time: 08/28/20 10:32 AM   Result Value Ref Range    Prox CCA PSV 47.4 cm/sec    Prox CCA PSV -64.6 cm/sec    Prox CCA EDV 11.0 cm/sec    Prox CCA EDV -9.3 cm/sec    Dist CCA PSV 50.8 cm/sec    Dist CCA PSV 70.8 cm/sec    Dist CCA EDV 17.6 cm/sec    Dist CCA EDV 14.3 cm/sec    Prox ECA PSV -49.5 cm/sec    Prox ECA PSV -80.1 cm/sec    Prox ECA EDV -6.5 cm/sec    Prox ECA EDV -11.2 cm/sec    Prox ICA PSV -50.1 cm/sec    Prox ICA PSV -102.6 cm/sec    Prox ICA EDV -16.2 cm/sec    Prox ICA EDV -25.2 cm/sec    Mid ICA PSV -62.7 cm/sec    Mid ICA EDV -22.6 cm/sec    Dist ICA PSV -86.0 cm/sec    Dist ICA EDV -27.5 cm/sec    Vertebral A PSV 58.0 cm/sec    Vertebral A EDV 12.7 cm/sec    Prox SCLA .0 cm/sec    Prox SCLA EDV 94.6 cm/sec    Mid ICA PSV 98.8 cm/sec    Mid ICA EDV 23.5 cm/sec    Dist ICA PSV 79.2 cm/sec    Dist ICA EDV 22.3 cm/sec    Vertebral A PSV 42.2 cm/sec    Vertebral A EDV 11.5 cm/sec    ICA/CCA ratio 1.44     Prox SCLA PSV 97.4 cm/sec    Prox SCLA EDV 16 cm/sec    ICA/CCA ratio 1.69     Left arm /68 mmHg    Right arm /69 mmHg   Basic Metabolic Panel    Collection Time: 09/04/20  9:09 AM    Specimen: Blood   Result Value Ref Range    Glucose 92 65 - 99 mg/dL    BUN 25 8 - 27 mg/dL    Creatinine 1.19 (H) 0.57 - 1.00 mg/dL    eGFR Non African Am 44 (L) >59 mL/min/1.73    eGFR  Am 51 (L) >59 mL/min/1.73    BUN/Creatinine Ratio 21 12 - 28    Sodium 139 134 - 144 mmol/L    Potassium 4.9 3.5 - 5.2 mmol/L    Chloride 98 96 - 106 mmol/L    Total CO2 23 20 - 29 mmol/L     Calcium 10.0 8.7 - 10.3 mg/dL

## 2020-10-07 LAB
BACTERIA UR CULT: ABNORMAL
BACTERIA UR CULT: ABNORMAL
OTHER ANTIBIOTIC SUSC ISLT: ABNORMAL

## 2020-10-22 DIAGNOSIS — I10 ESSENTIAL HYPERTENSION: ICD-10-CM

## 2020-10-22 NOTE — TELEPHONE ENCOUNTER
"CPA  Pt called needing refills of \"lisinopril (PRINIVIL,ZESTRIL) 10 MG tablet\" (q90 day) sent to [facility] [facility] mail order    Pt cb#[phone]   "

## 2020-10-23 RX ORDER — LISINOPRIL 10 MG/1
10 TABLET ORAL DAILY
Qty: 90 TABLET | Refills: 1 | Status: SHIPPED | OUTPATIENT
Start: 2020-10-23 | End: 2021-03-17

## 2020-11-02 DIAGNOSIS — E78.5 DYSLIPIDEMIA: Primary | ICD-10-CM

## 2020-11-02 RX ORDER — ATORVASTATIN CALCIUM 40 MG/1
40 TABLET, FILM COATED ORAL
Qty: 90 TABLET | Refills: 0 | Status: SHIPPED | OUTPATIENT
Start: 2020-11-02 | End: 2021-02-02 | Stop reason: SDUPTHER

## 2020-12-16 ENCOUNTER — TELEMEDICINE (OUTPATIENT)
Dept: CARDIOLOGY | Facility: CLINIC | Age: 75
End: 2020-12-16

## 2020-12-16 VITALS
WEIGHT: 180 LBS | DIASTOLIC BLOOD PRESSURE: 67 MMHG | HEIGHT: 67 IN | BODY MASS INDEX: 28.25 KG/M2 | SYSTOLIC BLOOD PRESSURE: 140 MMHG

## 2020-12-16 DIAGNOSIS — E78.5 DYSLIPIDEMIA: ICD-10-CM

## 2020-12-16 DIAGNOSIS — I10 ESSENTIAL HYPERTENSION: Primary | ICD-10-CM

## 2020-12-16 DIAGNOSIS — I49.5 SICK SINUS SYNDROME DUE TO SA NODE DYSFUNCTION (HCC): ICD-10-CM

## 2020-12-16 DIAGNOSIS — Z95.0 PRESENCE OF CARDIAC PACEMAKER: ICD-10-CM

## 2020-12-16 PROCEDURE — 99442 PR PHYS/QHP TELEPHONE EVALUATION 11-20 MIN: CPT | Performed by: INTERNAL MEDICINE

## 2020-12-16 RX ORDER — AMLODIPINE BESYLATE 10 MG/1
10 TABLET ORAL DAILY
Qty: 90 TABLET | Refills: 2 | Status: SHIPPED | OUTPATIENT
Start: 2020-12-16 | End: 2021-09-07

## 2020-12-16 NOTE — PROGRESS NOTES
Subjective:     Encounter Date:12/16/2020      Patient ID: Meme Morales is a 75 y.o. female.    Chief Complaint:  Chief Complaint   Patient presents with   • Congestive Heart Failure   • Cardiac Valve Problem       HPI:  History of Present Illness     You have chosen to receive care through a telephone visit. Do you consent to use a telephone visit for your medical care today? Yes    I had a telephone visit with Ms. Morales today. She is a pleasant 75-year-old female with a history of hypertension, hyperlipidemia sick sinus syndrome and is status post pacemaker placement.  She also has a history of a cerebral aneurysm and underwent coiling.  She has a history of chronic kidney disease as well as mild mitral and moderate tricuspid insufficiency.  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  She has occasional right arm numbness.    Ms. Morales states that she is feeling fairly well.  She continues to have occasional shortness of air but this is unchanged.  She is not complaining of chest discomfort.  She is not complaining of palpitations, dizziness or near syncope.  She has minimal lower extremity edema.  On her last visit I did add lisinopril 10 mg daily to her medical regimen. She did have lab work approximately 1 month later.  Her potassium was 4.9.  BUN 25 and creatinine was 1.19.  This was from 9/4/2020.  She does see nephrology in the next month or so.  Her blood pressure does continue to be elevated.  Her readings are generally anywhere between systolics of 140 up to 158.  I would like to see a little bit better blood pressure control however I do not want her pressure too low due to renal perfusion issues.  I am going to increase her amlodipine to 10 mg daily.  She will monitor her blood pressure.  This could potentially make her lower extremity edema worse.  We will just have to monitor and see.    The following portions of the patient's history were reviewed and updated as  appropriate: allergies, current medications, past family history, past medical history, past social history, past surgical history and problem list.    Problem List:  Patient Active Problem List   Diagnosis   • Cerebral aneurysm without rupture   • Hypertension   • Sick sinus syndrome due to SA node dysfunction (CMS/HCC)   • Cerebral aneurysm, nonruptured   • Other cerebrovascular disease   • CKD (chronic kidney disease) stage 3, GFR 30-59 ml/min   • Tricuspid regurgitation   • Mitral valve regurgitation   • Presence of cardiac pacemaker   • Right carotid bruit   • Thoracic outlet syndrome associated with cervical rib   • Dyslipidemia   • Urinary tract infection without hematuria       Past Medical History:  Past Medical History:   Diagnosis Date   • Acid reflux    • Arthritis    • Arthritis of carpometacarpal (CMC) joint of right thumb    • Francisco esophagus    • Basal cell carcinoma    • Brain aneurysm    • Burning with urination    • Cancer (CMS/HCC)     non malignant skin cancers   • Cerebral aneurysm    • Congestive heart failure (CMS/HCC)    • Depression    • Difficulty swallowing    • Disease of thyroid gland     HISTORY OF NODULES, SURGERY.    • Encounter for Medicare annual wellness exam    • Esophageal reflux    • Exposure to chickenpox    • H/O TB skin testing     WITH POSITIVE RESULT. NEG CXR.   • Heart murmur    • History of hyperparathyroidism    • History of migraine headaches    • Hoarseness    • Hypercalcemia    • Hyperlipidemia    • Hypertension    • Hypothyroidism    • Leaky heart valve    • Low back pain    • Osteopenia with high risk of fracture    • Overweight with body mass index (BMI) of 25 to 25.9 in adult    • Pacemaker     IMPLANTED LEFT SIDE   • Palpitations    • Pseudocholinesterase deficiency     AUNT ON FATHER'S SIDE OF FAMILY   • Pulmonary hypertension (CMS/HCC)    • Rheumatic multiple valve disease    • RLS (restless legs syndrome)    • Rubella    • Seasonal allergies    • Sick sinus  syndrome due to SA node dysfunction (CMS/HCC)     REASON FOR PACEMAKER   • Skin disorder    • Slow to wake up after anesthesia    • Stage 3 chronic kidney disease    • Symptoms of urinary tract infection    • Thoracic outlet syndrome    • Thyroid disease    • Tinea cruris    • Tricuspid valve regurgitation    • Urinary hesitancy    • Valvular heart disease    • Vitamin D deficiency        Past Surgical History:  Past Surgical History:   Procedure Laterality Date   • CARDIAC CATHETERIZATION      Post-Proc Data:  Lesions successfully dilated    • CATARACT EXTRACTION WITH INTRAOCULAR LENS IMPLANT      LEFT AND RIGHT   • CEREBRAL ANGIOGRAM N/A 2019    Procedure: CEREBRAL ANGIOGRAM;  Surgeon: Ranjit Key MD;  [facility] ;  Service: Neurosurgery   • COLONOSCOPY     • CYST REMOVAL Right     REMOVED UNDER RIGHT ARM. BENIGN   • EMBOLIZATION CEREBRAL N/A 2018    Procedure: Cerebral angiography and embolization of cerebral aneurysm with possible Neuroform stent;  Surgeon: Ranjit Key MD;  [facility] ;  Service: Neurosurgery   • HYSTERECTOMY     • PACEMAKER IMPLANTATION Left     [employer]   • PARATHYROID GLAND SURGERY      ONE GLAND RIGHT   • THYROIDECTOMY, PARTIAL Right    • TONSILLECTOMY AND ADENOIDECTOMY     • UPPER GASTROINTESTINAL ENDOSCOPY         Social History:  Social History     Socioeconomic History   • Marital status:      Spouse name: Not on file   • Number of children: Not on file   • Years of education: Not on file   • Highest education level: Not on file   Occupational History   • Occupation: retired   Tobacco Use   • Smoking status: Former Smoker     Packs/day: 1.50     Years: 25.00     Pack years: 37.50     Types: Cigarettes     Quit date:      Years since quittin.9   • Smokeless tobacco: Never Used   Substance and Sexual Activity   • Alcohol use: Yes     Comment: occasional   • Drug use: No   • Sexual activity: Defer        Allergies:  Allergies   Allergen Reactions   • Codeine Other (See Comments)     HYPOTENTION   • Morphine And Related Hallucinations       Immunizations:  Immunization History   Administered Date(s) Administered   • Pneumococcal Conjugate 13-Valent (PCV13) 03/22/2018   • Tdap 01/01/2012   • Zoster, Unspecified 01/03/2020       ROS:  Review of Systems   Constitution: Negative for chills, decreased appetite, fever, malaise/fatigue, weight gain and weight loss.   HENT: Negative for congestion, hoarse voice, nosebleeds and sore throat.    Eyes: Negative for blurred vision, double vision and visual disturbance.   Cardiovascular: Positive for dyspnea on exertion and leg swelling. Negative for chest pain, claudication, irregular heartbeat, near-syncope, orthopnea, palpitations, paroxysmal nocturnal dyspnea and syncope.   Respiratory: Negative for cough, hemoptysis, shortness of breath, sleep disturbances due to breathing, snoring, sputum production and wheezing.    Endocrine: Negative for cold intolerance, heat intolerance, polydipsia and polyuria.   Hematologic/Lymphatic: Negative for adenopathy and bleeding problem. Does not bruise/bleed easily.   Skin: Negative for flushing, itching, nail changes and rash.   Musculoskeletal: Negative for arthritis, back pain, joint pain, muscle cramps, muscle weakness, myalgias and neck pain.   Gastrointestinal: Negative for bloating, abdominal pain, anorexia, change in bowel habit, constipation, diarrhea, heartburn, hematemesis, hematochezia, jaundice, melena, nausea and vomiting.   Genitourinary: Negative for dysuria, hematuria and nocturia.   Neurological: Negative for brief paralysis, disturbances in coordination, excessive daytime sleepiness, dizziness, headaches, light-headedness, loss of balance, numbness, paresthesias, seizures and vertigo.   Psychiatric/Behavioral: Negative for altered mental status and depression. The patient is not nervous/anxious.   "  Allergic/Immunologic: Negative for environmental allergies and hives.          Objective:         /67   Ht 170.2 cm (67\")   Wt 81.6 kg (180 lb)   BMI 28.19 kg/m²     Physical Exam  Unable to perform physical exam secondary to telephone visit  In-Office Procedure(s):  Procedures    ASCVD RIsk Score::  The ASCVD Risk score (Ramakrishnapaulette BO Jr., et al., 2013) failed to calculate for the following reasons:    The patient has a prior MI or stroke diagnosis    Recent Radiology:  Imaging Results (Most Recent)     None          Lab Review:   not applicable             Assessment:          Diagnosis Plan   1. Essential hypertension  amLODIPine (NORVASC) 10 MG tablet   2. Sick sinus syndrome due to SA node dysfunction (CMS/HCC)     3. Presence of cardiac pacemaker     4. Dyslipidemia            Plan:      Ms. Morales's blood pressure remains elevated.  I am going to increase her amlodipine to 10 mg daily.  As stated above, I do not want to lower her pressure too much.  We will need to continue to monitor her renal function.  She does see nephrology in the next month.  She is going to call me within the next 2 to 4 weeks and let me know how her blood pressure is trending    Pacemaker was interrogated on 11/30/2020.  Her device demonstrated normal function.  There was no atrial fibrillation.  She was pacing the atrium 69% in the ventricle 70%.    I had ordered a lipid profile on her last visit along with her BMP however I do not see this in the computer.  We will have to give her another requisition slip    Length of telephone visit was 11 minutes  EMR documentation and review of records 10 minutes      Level of Care:                 Xochilt Conner MD  12/16/20  .  "

## 2021-02-02 DIAGNOSIS — E78.5 DYSLIPIDEMIA: ICD-10-CM

## 2021-02-02 RX ORDER — ATORVASTATIN CALCIUM 40 MG/1
40 TABLET, FILM COATED ORAL DAILY
Qty: 10 TABLET | Refills: 0 | Status: SHIPPED | OUTPATIENT
Start: 2021-02-02 | End: 2021-05-10 | Stop reason: SDUPTHER

## 2021-02-02 RX ORDER — ATORVASTATIN CALCIUM 40 MG/1
40 TABLET, FILM COATED ORAL
Qty: 90 TABLET | Refills: 1 | Status: SHIPPED | OUTPATIENT
Start: 2021-02-02 | End: 2021-02-09

## 2021-02-02 NOTE — TELEPHONE ENCOUNTER
"CPA  Pt called needing \"atorvastatin (LIPITOR) 40 MG tablet\" 10 days sent [facility] # [phone] and remaining (*80 days) sent to [facility] mail order please    Pt cb# [phone]   "

## 2021-02-09 ENCOUNTER — OFFICE VISIT (OUTPATIENT)
Dept: FAMILY MEDICINE CLINIC | Facility: CLINIC | Age: 76
End: 2021-02-09

## 2021-02-09 VITALS
BODY MASS INDEX: 28.98 KG/M2 | HEART RATE: 65 BPM | TEMPERATURE: 96 F | WEIGHT: 185 LBS | RESPIRATION RATE: 15 BRPM | DIASTOLIC BLOOD PRESSURE: 76 MMHG | SYSTOLIC BLOOD PRESSURE: 148 MMHG | OXYGEN SATURATION: 97 %

## 2021-02-09 DIAGNOSIS — Z00.00 ROUTINE HEALTH MAINTENANCE: Primary | ICD-10-CM

## 2021-02-09 PROBLEM — E78.5 DYSLIPIDEMIA: Status: RESOLVED | Noted: 2020-08-11 | Resolved: 2021-02-09

## 2021-02-09 PROBLEM — E78.2 MIXED HYPERLIPIDEMIA: Status: ACTIVE | Noted: 2021-02-09

## 2021-02-09 PROBLEM — E78.5 HYPERLIPIDEMIA: Status: ACTIVE | Noted: 2021-02-09

## 2021-02-09 PROCEDURE — 96160 PT-FOCUSED HLTH RISK ASSMT: CPT | Performed by: FAMILY MEDICINE

## 2021-02-09 PROCEDURE — 1170F FXNL STATUS ASSESSED: CPT | Performed by: FAMILY MEDICINE

## 2021-02-09 PROCEDURE — 1160F RVW MEDS BY RX/DR IN RCRD: CPT | Performed by: FAMILY MEDICINE

## 2021-02-09 PROCEDURE — G0439 PPPS, SUBSEQ VISIT: HCPCS | Performed by: FAMILY MEDICINE

## 2021-02-09 PROCEDURE — 1126F AMNT PAIN NOTED NONE PRSNT: CPT | Performed by: FAMILY MEDICINE

## 2021-02-09 RX ORDER — LEVOTHYROXINE SODIUM 0.07 MG/1
TABLET ORAL EVERY 24 HOURS
COMMUNITY
End: 2021-05-10 | Stop reason: SDUPTHER

## 2021-02-09 RX ORDER — CALCIUM POLYCARBOPHIL 625 MG 625 MG/1
TABLET ORAL EVERY 6 HOURS SCHEDULED
COMMUNITY

## 2021-02-09 RX ORDER — MONTELUKAST SODIUM 10 MG/1
TABLET ORAL EVERY 24 HOURS
COMMUNITY
End: 2021-05-10 | Stop reason: SDUPTHER

## 2021-02-09 RX ORDER — AMLODIPINE BESYLATE 5 MG/1
TABLET ORAL EVERY 24 HOURS
COMMUNITY
End: 2021-02-09

## 2021-02-09 RX ORDER — AZELASTINE HCL 205.5 UG/1
SPRAY NASAL EVERY 12 HOURS SCHEDULED
COMMUNITY
End: 2021-05-10 | Stop reason: SDUPTHER

## 2021-02-09 RX ORDER — ATORVASTATIN CALCIUM 40 MG/1
TABLET, FILM COATED ORAL EVERY 24 HOURS
COMMUNITY
End: 2021-02-09

## 2021-02-09 RX ORDER — FUROSEMIDE 40 MG/1
40 TABLET ORAL DAILY PRN
COMMUNITY
End: 2021-08-10 | Stop reason: SDUPTHER

## 2021-02-09 RX ORDER — ROPINIROLE 1 MG/1
1 TABLET, FILM COATED ORAL 3 TIMES DAILY
COMMUNITY
End: 2021-11-11 | Stop reason: SDUPTHER

## 2021-02-09 RX ORDER — FLUNISOLIDE 0.25 MG/ML
SOLUTION NASAL EVERY 12 HOURS SCHEDULED
COMMUNITY
End: 2021-05-10 | Stop reason: SDUPTHER

## 2021-02-09 RX ORDER — ASPIRIN 325 MG
TABLET ORAL EVERY 24 HOURS
COMMUNITY
End: 2021-02-09

## 2021-02-09 RX ORDER — MULTIVIT WITH MINERALS/LUTEIN
TABLET ORAL EVERY 24 HOURS
COMMUNITY

## 2021-02-09 NOTE — PATIENT INSTRUCTIONS
"Healthy Eating  Following a healthy eating pattern may help you to achieve and maintain a healthy body weight, reduce the risk of chronic disease, and live a long and productive life. It is important to follow a healthy eating pattern at an appropriate calorie level for your body. Your nutritional needs should be met primarily through food by choosing a variety of nutrient-rich foods.  What are tips for following this plan?  Reading food labels  · Read labels and choose the following:  ? Reduced or low sodium.  ? Juices with 100% fruit juice.  ? Foods with low saturated fats and high polyunsaturated and monounsaturated fats.  ? Foods with whole grains, such as whole wheat, cracked wheat, brown rice, and wild rice.  ? Whole grains that are fortified with folic acid. This is recommended for women who are pregnant or who want to become pregnant.  · Read labels and avoid the following:  ? Foods with a lot of added sugars. These include foods that contain brown sugar, corn sweetener, corn syrup, dextrose, fructose, glucose, high-fructose corn syrup, honey, invert sugar, lactose, malt syrup, maltose, molasses, raw sugar, sucrose, trehalose, or turbinado sugar.  § Do not eat more than the following amounts of added sugar per day:  § 6 teaspoons (25 g) for women.  § 9 teaspoons (38 g) for men.  ? Foods that contain processed or refined starches and grains.  ? Refined grain products, such as white flour, degermed cornmeal, white bread, and white rice.  Shopping  · Choose nutrient-rich snacks, such as vegetables, whole fruits, and nuts. Avoid high-calorie and high-sugar snacks, such as potato chips, fruit snacks, and candy.  · Use oil-based dressings and spreads on foods instead of solid fats such as butter, stick margarine, or cream cheese.  · Limit pre-made sauces, mixes, and \"instant\" products such as flavored rice, instant noodles, and ready-made pasta.  · Try more plant-protein sources, such as tofu, tempeh, black beans, " edamame, lentils, nuts, and seeds.  · Explore eating plans such as the Mediterranean diet or vegetarian diet.  Cooking  · Use oil to sauté or stir-perez foods instead of solid fats such as butter, stick margarine, or lard.  · Try baking, boiling, grilling, or broiling instead of frying.  · Remove the fatty part of meats before cooking.  · Steam vegetables in water or broth.  Meal planning    · At meals, imagine dividing your plate into fourths:  ? One-half of your plate is fruits and vegetables.  ? One-fourth of your plate is whole grains.  ? One-fourth of your plate is protein, especially lean meats, poultry, eggs, tofu, beans, or nuts.  · Include low-fat dairy as part of your daily diet.  Lifestyle  · Choose healthy options in all settings, including home, work, school, restaurants, or stores.  · Prepare your food safely:  ? Wash your hands after handling raw meats.  ? Keep food preparation surfaces clean by regularly washing with hot, soapy water.  ? Keep raw meats separate from ready-to-eat foods, such as fruits and vegetables.  ? , meat, poultry, and eggs to the recommended internal temperature.  ? Store foods at safe temperatures. In general:  § Keep cold foods at 40°F (4.4°C) or below.  § Keep hot foods at 140°F (60°C) or above.  § Keep your freezer at 0°F (-17.8°C) or below.  § Foods are no longer safe to eat when they have been between the temperatures of 40°-140°F (4.4-60°C) for more than 2 hours.  What foods should I eat?  Fruits  Aim to eat 2 cup-equivalents of fresh, canned (in natural juice), or frozen fruits each day. Examples of 1 cup-equivalent of fruit include 1 small apple, 8 large strawberries, 1 cup canned fruit, ½ cup dried fruit, or 1 cup 100% juice.  Vegetables  Aim to eat 2½-3 cup-equivalents of fresh and frozen vegetables each day, including different varieties and colors. Examples of 1 cup-equivalent of vegetables include 2 medium carrots, 2 cups raw, leafy greens, 1 cup chopped  vegetable (raw or cooked), or 1 medium baked potato.  Grains  Aim to eat 6 ounce-equivalents of whole grains each day. Examples of 1 ounce-equivalent of grains include 1 slice of bread, 1 cup ready-to-eat cereal, 3 cups popcorn, or ½ cup cooked rice, pasta, or cereal.  Meats and other proteins  Aim to eat 5-6 ounce-equivalents of protein each day. Examples of 1 ounce-equivalent of protein include 1 egg, 1/2 cup nuts or seeds, or 1 tablespoon (16 g) peanut butter. A cut of meat or fish that is the size of a deck of cards is about 3-4 ounce-equivalents.  · Of the protein you eat each week, try to have at least 8 ounces come from seafood. This includes salmon, trout, herring, and anchovies.  Dairy  Aim to eat 3 cup-equivalents of fat-free or low-fat dairy each day. Examples of 1 cup-equivalent of dairy include 1 cup (240 mL) milk, 8 ounces (250 g) yogurt, 1½ ounces (44 g) natural cheese, or 1 cup (240 mL) fortified soy milk.  Fats and oils  · Aim for about 5 teaspoons (21 g) per day. Choose monounsaturated fats, such as canola and olive oils, avocados, peanut butter, and most nuts, or polyunsaturated fats, such as sunflower, corn, and soybean oils, walnuts, pine nuts, sesame seeds, sunflower seeds, and flaxseed.  Beverages  · Aim for six 8-oz glasses of water per day. Limit coffee to three to five 8-oz cups per day.  · Limit caffeinated beverages that have added calories, such as soda and energy drinks.  · Limit alcohol intake to no more than 1 drink a day for nonpregnant women and 2 drinks a day for men. One drink equals 12 oz of beer (355 mL), 5 oz of wine (148 mL), or 1½ oz of hard liquor (44 mL).  Seasoning and other foods  · Avoid adding excess amounts of salt to your foods. Try flavoring foods with herbs and spices instead of salt.  · Avoid adding sugar to foods.  · Try using oil-based dressings, sauces, and spreads instead of solid fats.  This information is based on general [place] nutrition guidelines. For more  information, visit QReca!plate.gov. Exact amounts may vary based on your nutrition needs.  Summary  · A healthy eating plan may help you to maintain a healthy weight, reduce the risk of chronic diseases, and stay active throughout your life.  · Plan your meals. Make sure you eat the right portions of a variety of nutrient-rich foods.  · Try baking, boiling, grilling, or broiling instead of frying.  · Choose healthy options in all settings, including home, work, school, restaurants, or stores.  This information is not intended to replace advice given to you by your health care provider. Make sure you discuss any questions you have with your health care provider.  Document Revised: 04/01/2019 Document Reviewed: 04/01/2019  [employer] Patient Education © 2020 [employer]      Exercising to Stay Healthy  To become healthy and stay healthy, it is recommended that you do moderate-intensity and vigorous-intensity exercise. You can tell that you are exercising at a moderate intensity if your heart starts beating faster and you start breathing faster but can still hold a conversation. You can tell that you are exercising at a vigorous intensity if you are breathing much harder and faster and cannot hold a conversation while exercising.  Exercising regularly is important. It has many health benefits, such as:  · Improving overall fitness, flexibility, and endurance.  · Increasing bone density.  · Helping with weight control.  · Decreasing body fat.  · Increasing muscle strength.  · Reducing stress and tension.  · Improving overall health.  How often should I exercise?  Choose an activity that you enjoy, and set realistic goals. Your health care provider can help you make an activity plan that works for you.  Exercise regularly as told by your health care provider. This may include:  · Doing strength training two times a week, such as:  ? Lifting weights.  ? Using resistance bands.  ? Push-ups.  ? Sit-ups.  ? Yoga.  · Doing  a certain intensity of exercise for a given amount of time. Choose from these options:  ? A total of 150 minutes of moderate-intensity exercise every week.  ? A total of 75 minutes of vigorous-intensity exercise every week.  ? A mix of moderate-intensity and vigorous-intensity exercise every week.  Children, pregnant women, people who have not exercised regularly, people who are overweight, and older adults may need to talk with a health care provider about what activities are safe to do. If you have a medical condition, be sure to talk with your health care provider before you start a new exercise program.  What are some exercise ideas?  Moderate-intensity exercise ideas include:  · Walking 1 mile (1.6 km) in about 15 minutes.  · Biking.  · Hiking.  · Golfing.  · Dancing.  · Water aerobics.  Vigorous-intensity exercise ideas include:  · Walking 4.5 miles (7.2 km) or more in about 1 hour.  · Jogging or running 5 miles (8 km) in about 1 hour.  · Biking 10 miles (16.1 km) or more in about 1 hour.  · Lap swimming.  · Roller-skating or in-line skating.  · Cross-country skiing.  · Vigorous competitive sports, such as football, basketball, and soccer.  · Jumping rope.  · Aerobic dancing.  What are some everyday activities that can help me to get exercise?  · Yard work, such as:  ? Pushing a .  ? Raking and bagging leaves.  · Washing your car.  · Pushing a stroller.  · Shoveling snow.  · Gardening.  · Washing windows or floors.  How can I be more active in my day-to-day activities?  · Use stairs instead of an elevator.  · Take a walk during your lunch break.  · If you drive, park your car farther away from your work or school.  · If you take public transportation, get off one stop early and walk the rest of the way.  · Stand up or walk around during all of your indoor phone calls.  · Get up, stretch, and walk around every 30 minutes throughout the day.  · Enjoy exercise with a friend. Support to continue  exercising will help you keep a regular routine of activity.  What guidelines can I follow while exercising?  · Before you start a new exercise program, talk with your health care provider.  · Do not exercise so much that you hurt yourself, feel dizzy, or get very short of breath.  · Wear comfortable clothes and wear shoes with good support.  · Drink plenty of water while you exercise to prevent dehydration or heat stroke.  · Work out until your breathing and your heartbeat get faster.  Where to find more information  · [place] Department of Health and Human Services: www.hhs.gov  · Centers for Disease Control and Prevention (CDC): www.cdc.gov  Summary  · Exercising regularly is important. It will improve your overall fitness, flexibility, and endurance.  · Regular exercise also will improve your overall health. It can help you control your weight, reduce stress, and improve your bone density.  · Do not exercise so much that you hurt yourself, feel dizzy, or get very short of breath.  · Before you start a new exercise program, talk with your health care provider.  This information is not intended to replace advice given to you by your health care provider. Make sure you discuss any questions you have with your health care provider.  Document Revised: 11/30/2018 Document Reviewed: 11/08/2018  [employer] Patient Education © 2020 [employer]

## 2021-02-09 NOTE — PROGRESS NOTES
The ABCs of the Annual Wellness Visit  Subsequent Medicare Wellness Visit    Chief Complaint   Patient presents with   • Establish Care       Subjective   History of Present Illness:  Meme Morales is a 75 y.o. female who presents for a Subsequent Medicare Wellness Visit.    Here today to establish care.  Overall doing well, no specific questions or concerns today.  No need for any refills.  Reports good adherence and tolerance to current regimen.    HEALTH RISK ASSESSMENT    Recent Hospitalizations:  No hospitalization(s) within the last year.    Current Medical Providers:  Patient Care Team:  Joe Goddard MD as PCP - General (Family Medicine)  Xochilt Conner MD as Consulting Physician (Cardiology)  Corry Jain MD as Consulting Physician (Nephrology)    Smoking Status:  Social History     Tobacco Use   Smoking Status Former Smoker   • Packs/day: 1.50   • Years: 25.00   • Pack years: 37.50   • Types: Cigarettes   • Quit date:    • Years since quittin.1   Smokeless Tobacco Never Used       Alcohol Consumption:  Social History     Substance and Sexual Activity   Alcohol Use Yes    Comment: occasional       Depression Screen:   No flowsheet data found.    Fall Risk Screen:  STEADI Fall Risk Assessment has not been completed.    Health Habits and Functional and Cognitive Screening:  No flowsheet data found.      Does the patient have evidence of cognitive impairment? No    Asprin use counseling:Taking ASA appropriately as indicated    Age-appropriate Screening Schedule:  Refer to the list below for future screening recommendations based on patient's age, sex and/or medical conditions. Orders for these recommended tests are listed in the plan section. The patient has been provided with a written plan.    Health Maintenance   Topic Date Due   • DXA SCAN  1945   • LIPID PANEL  10/16/2018   • TDAP/TD VACCINES (2 - Td) 2022   • COLONOSCOPY  2025   • INFLUENZA VACCINE   Completed   • ZOSTER VACCINE  Completed          The following portions of the patient's history were reviewed and updated as appropriate: allergies, current medications, past family history, past medical history, past social history, past surgical history and problem list.    Outpatient Medications Prior to Visit   Medication Sig Dispense Refill   • amLODIPine (NORVASC) 10 MG tablet Take 1 tablet by mouth Daily. 90 tablet 2   • aspirin  MG tablet Take 1 tablet by mouth Daily. (Patient taking differently: Take 325 mg by mouth Daily. PT HAS APPT TODAY TO ASK WHEN TO HOLD FOR PROCEDURE) 30 tablet 6   • atorvastatin (LIPITOR) 40 MG tablet Take 1 tablet by mouth Daily. 10 tablet 0   • Cholecalciferol (VITAMIN D3) 2000 units tablet Take 2,000 Units by mouth Daily.     • denosumab (PROLIA) 60 MG/ML solution prefilled syringe syringe Inject 60 mg under the skin into the appropriate area as directed 1 (One) Time.     • lisinopril (PRINIVIL,ZESTRIL) 10 MG tablet Take 1 tablet by mouth Daily. 90 tablet 1   • pantoprazole (PROTONIX) 40 MG EC tablet Take 40 mg by mouth Daily.     • acetaminophen (TYLENOL) 500 MG tablet Take 1,000 mg by mouth Every 6 (Six) Hours As Needed for Mild Pain  or Fever.     • azelastine (ASTELIN) 0.1 % nasal spray 2 sprays into the nostril(s) as directed by provider 2 (Two) Times a Day. Use in each nostril as directed     • calcium polycarbophil (FIBERCON) 625 MG tablet Take 1,250 mg by mouth Daily.     • flunisolide (NASALIDE) 25 MCG/ACT (0.025%) solution nasal spray      • furosemide (LASIX) 40 MG tablet Take 40 mg by mouth Daily As Needed (WITH SWELLING).     • levothyroxine (SYNTHROID, LEVOTHROID) 75 MCG tablet Take 75 mcg by mouth Daily.     • montelukast (SINGULAIR) 10 MG tablet Take 10 mg by mouth Daily.     • rOPINIRole (REQUIP) 1 MG tablet Take 1 mg by mouth 2 (Two) Times a Day. TAKES IN AFTERNOON AND AT BEDTIME     • vitamin C (ASCORBIC ACID) 500 MG tablet Take 500 mg by mouth Daily.      • Acetaminophen 500 MG capsule Daily.     • ascorbic acid (VITAMIN C) 1000 MG tablet Daily.     • azelastine (ASTEPRO) 0.15 % solution nasal spray Every 12 (Twelve) Hours.     • calcium polycarbophil (FiberCon) 625 MG tablet Every 6 (Six) Hours.     • flunisolide (NASALIDE) 25 MCG/ACT (0.025%) solution nasal spray Every 12 (Twelve) Hours.     • furosemide (Lasix) 40 MG tablet Daily.     • levothyroxine (SYNTHROID, LEVOTHROID) 75 MCG tablet Daily.     • montelukast (Singulair) 10 MG tablet Daily.     • rOPINIRole (REQUIP) 1 MG tablet Every 12 (Twelve) Hours.     • amLODIPine (NORVASC) 5 MG tablet Daily.     • aspirin 325 MG tablet Daily.     • atorvastatin (LIPITOR) 40 MG tablet Take 1 tablet by mouth every night at bedtime. 90 tablet 1   • atorvastatin (LIPITOR) 40 MG tablet Daily.     • Cholecalciferol-Vitamin C (Vitamin D3-Vitamin C) 1000-500 UNIT-MG capsule Daily.       No facility-administered medications prior to visit.        Patient Active Problem List   Diagnosis   • Essential hypertension   • Sick sinus syndrome due to SA node dysfunction (CMS/HCC)   • Cerebral aneurysm, nonruptured   • Other cerebrovascular disease   • CKD (chronic kidney disease) stage 3, GFR 30-59 ml/min (CMS/HCC)   • Tricuspid regurgitation   • Mitral valve regurgitation   • Presence of cardiac pacemaker   • Right carotid bruit   • Thoracic outlet syndrome associated with cervical rib   • Mixed hyperlipidemia   • Hyperparathyroidism (CMS/HCC)   • Thyroid nodule       Advanced Care Planning:  ACP discussion was held with the patient during this visit. Patient has an advance directive in EMR which is still valid.     Review of Systems    Compared to one year ago, the patient feels her physical health is the same.  Compared to one year ago, the patient feels her mental health is the same.    Reviewed chart for potential of high risk medication in the elderly: yes  Reviewed chart for potential of harmful drug interactions in the  elderly:yes    Objective         Vitals:    02/09/21 0936   BP: 148/76   Pulse: 65   Resp: 15   Temp: 96 °F (35.6 °C)   SpO2: 97%   Weight: 83.9 kg (185 lb)       Body mass index is 28.98 kg/m².  Discussed the patient's BMI with her. The BMI is in the acceptable range.    Physical Exam          Assessment/Plan   Medicare Risks and Personalized Health Plan  CMS Preventative Services Quick Reference  Obesity/Overweight     The above risks/problems have been discussed with the patient.  Pertinent information has been shared with the patient in the After Visit Summary.  Follow up plans and orders are seen below in the Assessment/Plan Section.    Diagnoses and all orders for this visit:    1. Routine health maintenance (Primary)    No labs or interventions recommended at this time.  She like to hold off until after the pandemic to talk about DEXA scanning.  Will get labs at next visit.    Recommended follow-up as below.  Encouraged communication via [employer] in the meantime.    Follow Up:  Return in about 3 months (around 5/9/2021).     An After Visit Summary and PPPS were given to the patient.       Prevention counseling performed as below:  Healthy eating and exercise

## 2021-03-09 DIAGNOSIS — Z23 IMMUNIZATION DUE: ICD-10-CM

## 2021-03-17 DIAGNOSIS — I10 ESSENTIAL HYPERTENSION: ICD-10-CM

## 2021-03-17 RX ORDER — LISINOPRIL 10 MG/1
TABLET ORAL
Qty: 90 TABLET | Refills: 1 | Status: SHIPPED | OUTPATIENT
Start: 2021-03-17 | End: 2021-06-21 | Stop reason: ALTCHOICE

## 2021-03-23 ENCOUNTER — OFFICE VISIT (OUTPATIENT)
Dept: FAMILY MEDICINE CLINIC | Facility: CLINIC | Age: 76
End: 2021-03-23

## 2021-03-23 VITALS
WEIGHT: 185 LBS | SYSTOLIC BLOOD PRESSURE: 138 MMHG | DIASTOLIC BLOOD PRESSURE: 72 MMHG | HEART RATE: 69 BPM | TEMPERATURE: 97.8 F | RESPIRATION RATE: 16 BRPM | OXYGEN SATURATION: 99 % | BODY MASS INDEX: 28.98 KG/M2

## 2021-03-23 DIAGNOSIS — S80.11XA CONTUSION OF RIGHT LOWER LEG, INITIAL ENCOUNTER: Primary | ICD-10-CM

## 2021-03-23 DIAGNOSIS — R60.9 DEPENDENT EDEMA: ICD-10-CM

## 2021-03-23 PROCEDURE — 99213 OFFICE O/P EST LOW 20 MIN: CPT | Performed by: FAMILY MEDICINE

## 2021-03-23 NOTE — PROGRESS NOTES
Chief Complaint  Leg Pain    Subjective    History of Present Illness {CC  Problem List  Visit  Diagnosis   Encounters  Notes  Medications  Labs  Result Review Imaging  Media :23}     Meme Morales presents to [facility] for Leg Pain.  History of Present Illness     Here with concern for some right leg pain and swelling into the ankle.  Has a rather large dog who accidentally ran into her right shin.  She had a small bruise and has had some pain in the area but then had rather significant swelling that extended down to her ankle.  She was not sure if this was something that she needed to be concerned about.  It does seem to improve with recumbency and seems to be getting better overall.  No pain in the calf, no erythema associated.  Has some baseline dependent edema, but this was significantly worse and worrisome.    Objective     Vital Signs:   /72   Pulse 69   Temp 97.8 °F (36.6 °C)   Resp 16   Wt 83.9 kg (185 lb)   SpO2 99%   BMI 28.98 kg/m²   Physical Exam  Vitals and nursing note reviewed.   Constitutional:       General: She is not in acute distress.     Appearance: Normal appearance. She is not ill-appearing.   Cardiovascular:      Rate and Rhythm: Normal rate and regular rhythm.      Pulses: Normal pulses.      Heart sounds: Normal heart sounds. No murmur heard.     Pulmonary:      Effort: Pulmonary effort is normal. No respiratory distress.      Breath sounds: Normal breath sounds. No rales.   Musculoskeletal:      Right lower le+ Pitting Edema present.   Skin:     Comments: Slight ecchymosis over R shin   Neurological:      Mental Status: She is alert and oriented to person, place, and time. Mental status is at baseline.   Psychiatric:         Mood and Affect: Mood normal.         Behavior: Behavior normal.          Result Review  Data Reviewed:{ Labs  Result Review  Imaging  Med Tab  Media :23}                   Assessment and Plan {CC  Problem List  Visit Diagnosis  ROS  Review (Popup)  [facility]  Quality  BestPractice  Medications  SmartSets  SnapShot Encounters  Media :23}   Diagnoses and all orders for this visit:    1. Contusion of right lower leg, initial encounter (Primary)    2. Dependent edema    Swelling likely secondary to the contusion, appears to be slowly resolving. No interventions necessary at this time. Continue to monitor.     Follow up as below. Encouraged to communicate via [employer] in the meantime.      Follow Up {Instructions Charge Capture  Follow-up Communications :23}     Patient was given instructions and counseling regarding her condition or for health maintenance advice. Please see specific information pulled into the AVS (placed there by myself) if appropriate.    No follow-ups on file.      DOMINIK Goddard MD

## 2021-05-10 ENCOUNTER — OFFICE VISIT (OUTPATIENT)
Dept: FAMILY MEDICINE CLINIC | Facility: CLINIC | Age: 76
End: 2021-05-10

## 2021-05-10 VITALS
RESPIRATION RATE: 16 BRPM | DIASTOLIC BLOOD PRESSURE: 70 MMHG | HEART RATE: 63 BPM | OXYGEN SATURATION: 99 % | TEMPERATURE: 96.4 F | SYSTOLIC BLOOD PRESSURE: 122 MMHG | BODY MASS INDEX: 28.66 KG/M2 | WEIGHT: 183 LBS

## 2021-05-10 DIAGNOSIS — E78.2 MIXED HYPERLIPIDEMIA: ICD-10-CM

## 2021-05-10 DIAGNOSIS — J30.1 CHRONIC ALLERGIC RHINITIS DUE TO POLLEN: ICD-10-CM

## 2021-05-10 DIAGNOSIS — I10 ESSENTIAL HYPERTENSION: Primary | ICD-10-CM

## 2021-05-10 DIAGNOSIS — E03.9 ACQUIRED HYPOTHYROIDISM: ICD-10-CM

## 2021-05-10 DIAGNOSIS — R60.9 DEPENDENT EDEMA: ICD-10-CM

## 2021-05-10 DIAGNOSIS — K21.9 GASTROESOPHAGEAL REFLUX DISEASE WITHOUT ESOPHAGITIS: ICD-10-CM

## 2021-05-10 PROCEDURE — 99214 OFFICE O/P EST MOD 30 MIN: CPT | Performed by: FAMILY MEDICINE

## 2021-05-10 RX ORDER — ATORVASTATIN CALCIUM 40 MG/1
40 TABLET, FILM COATED ORAL DAILY
Qty: 10 TABLET | Refills: 0 | Status: SHIPPED | OUTPATIENT
Start: 2021-05-10 | End: 2021-05-10

## 2021-05-10 RX ORDER — ATORVASTATIN CALCIUM 40 MG/1
40 TABLET, FILM COATED ORAL DAILY
Qty: 90 TABLET | Refills: 3 | Status: SHIPPED | OUTPATIENT
Start: 2021-05-10 | End: 2022-03-23

## 2021-05-10 RX ORDER — AZELASTINE HCL 205.5 UG/1
2 SPRAY NASAL 2 TIMES DAILY
Qty: 30 ML | Refills: 3 | Status: SHIPPED | OUTPATIENT
Start: 2021-05-10 | End: 2021-05-10

## 2021-05-10 RX ORDER — LEVOTHYROXINE SODIUM 0.07 MG/1
75 TABLET ORAL DAILY
Qty: 90 TABLET | Refills: 3 | Status: SHIPPED | OUTPATIENT
Start: 2021-05-10 | End: 2021-05-10

## 2021-05-10 RX ORDER — AZELASTINE HCL 205.5 UG/1
2 SPRAY NASAL 2 TIMES DAILY
Qty: 30 ML | Refills: 3 | Status: SHIPPED | OUTPATIENT
Start: 2021-05-10 | End: 2021-10-11

## 2021-05-10 RX ORDER — PANTOPRAZOLE SODIUM 40 MG/1
40 TABLET, DELAYED RELEASE ORAL DAILY
Qty: 90 TABLET | Refills: 3 | Status: SHIPPED | OUTPATIENT
Start: 2021-05-10 | End: 2021-05-10

## 2021-05-10 RX ORDER — PANTOPRAZOLE SODIUM 40 MG/1
40 TABLET, DELAYED RELEASE ORAL DAILY
Qty: 90 TABLET | Refills: 3 | Status: SHIPPED | OUTPATIENT
Start: 2021-05-10 | End: 2022-03-23

## 2021-05-10 RX ORDER — MONTELUKAST SODIUM 10 MG/1
10 TABLET ORAL EVERY 24 HOURS
Qty: 90 TABLET | Refills: 3 | Status: SHIPPED | OUTPATIENT
Start: 2021-05-10 | End: 2021-05-10

## 2021-05-10 RX ORDER — MONTELUKAST SODIUM 10 MG/1
10 TABLET ORAL EVERY 24 HOURS
Qty: 90 TABLET | Refills: 3 | Status: SHIPPED | OUTPATIENT
Start: 2021-05-10 | End: 2022-03-23

## 2021-05-10 RX ORDER — LEVOTHYROXINE SODIUM 0.07 MG/1
75 TABLET ORAL DAILY
Qty: 90 TABLET | Refills: 3 | Status: SHIPPED | OUTPATIENT
Start: 2021-05-10 | End: 2022-03-23

## 2021-05-10 RX ORDER — FLUNISOLIDE 0.25 MG/ML
2 SOLUTION NASAL EVERY 12 HOURS SCHEDULED
Qty: 25 ML | Refills: 3 | Status: SHIPPED | OUTPATIENT
Start: 2021-05-10 | End: 2021-10-11

## 2021-05-10 RX ORDER — FLUNISOLIDE 0.25 MG/ML
2 SOLUTION NASAL EVERY 12 HOURS SCHEDULED
Qty: 25 ML | Refills: 3 | Status: SHIPPED | OUTPATIENT
Start: 2021-05-10 | End: 2021-05-10

## 2021-05-10 NOTE — PROGRESS NOTES
Chief Complaint  Leg Swelling    Subjective    History of Present Illness {  Problem List  Visit  Diagnosis   Encounters  Notes  Medications  Labs  Result Review Imaging  Media :23}     Meme Morales presents to [facility] for Leg Swelling.  History of Present Illness     Here today for general follow-up and needing some refills of some prescriptions.  Doing well overall.  Has had a few episodes of dizziness recently.  Thinks that his relative to her fluid balance.  She has a history of some hypertension and dependent edema.  Has to be careful with drinking too much fluid as her legs will uncomfortably swell.  However she does recognize that on occasion when she has a not enough water to drink she gets dizzy with position changes.      Blood pressure is slightly overcontrolled today.  Is on amlodipine and lisinopril.  Dr. Conner had discussed possibly decreasing the lisinopril in the past.    Has hyperlipidemia, takes atorvastatin daily.  Needs refills today.    Has chronic allergic rhinitis for which she takes azelastine and flunisolide nasal sprays.  Requesting refills of both of these today.    Lastly requesting a refill of levothyroxine today.  Takes it as prescribed, last TSH was within normal limits.  No current symptoms of high or low thyroid function.    Objective     Vital Signs:   /70   Pulse 63   Temp 96.4 °F (35.8 °C)   Resp 16   Wt 83 kg (183 lb)   SpO2 99%   BMI 28.66 kg/m²   Physical Exam  Vitals and nursing note reviewed.   Constitutional:       General: She is not in acute distress.     Appearance: Normal appearance. She is not ill-appearing.   Cardiovascular:      Rate and Rhythm: Normal rate and regular rhythm.      Pulses: Normal pulses.      Heart sounds: Normal heart sounds. No murmur heard.     Pulmonary:      Effort: Pulmonary effort is normal. No respiratory distress.      Breath sounds: Normal breath sounds. No rales.    Musculoskeletal:      Right lower le+ Pitting Edema present.      Left lower le+ Pitting Edema present.   Neurological:      Mental Status: She is alert and oriented to person, place, and time. Mental status is at baseline.   Psychiatric:         Mood and Affect: Mood normal.         Behavior: Behavior normal.          Result Review  Data Reviewed:{ Labs  Result Review  Imaging  Med Tab  Media :23}                   Assessment and Plan {CC Problem List  Visit Diagnosis  ROS  Review (Popup)  Health Maintenance  Quality  BestPractice  Medications  SmartSets  SnapShot Encounters  Media :23}   Diagnoses and all orders for this visit:    1. Essential hypertension (Primary)    2. Dependent edema    3. Mixed hyperlipidemia  -     Discontinue: atorvastatin (LIPITOR) 40 MG tablet; Take 1 tablet by mouth Daily.  Dispense: 10 tablet; Refill: 0  -     atorvastatin (LIPITOR) 40 MG tablet; Take 1 tablet by mouth Daily.  Dispense: 90 tablet; Refill: 3    4. Chronic allergic rhinitis due to pollen  -     Discontinue: montelukast (Singulair) 10 MG tablet; Take 1 tablet by mouth Daily.  Dispense: 90 tablet; Refill: 3  -     Discontinue: azelastine (ASTEPRO) 0.15 % solution nasal spray; 2 sprays into the nostril(s) as directed by provider 2 (Two) Times a Day.  Dispense: 30 mL; Refill: 3  -     Discontinue: flunisolide (NASALIDE) 25 MCG/ACT (0.025%) solution nasal spray; Inhale 2 sprays Every 12 (Twelve) Hours.  Dispense: 25 mL; Refill: 3  -     azelastine (ASTEPRO) 0.15 % solution nasal spray; 2 sprays into the nostril(s) as directed by provider 2 (Two) Times a Day.  Dispense: 30 mL; Refill: 3  -     flunisolide (NASALIDE) 25 MCG/ACT (0.025%) solution nasal spray; Inhale 2 sprays Every 12 (Twelve) Hours.  Dispense: 25 mL; Refill: 3  -     montelukast (Singulair) 10 MG tablet; Take 1 tablet by mouth Daily.  Dispense: 90 tablet; Refill: 3    5. Acquired hypothyroidism  -     Discontinue: levothyroxine  (SYNTHROID, LEVOTHROID) 75 MCG tablet; Take 1 tablet by mouth Daily.  Dispense: 90 tablet; Refill: 3  -     levothyroxine (SYNTHROID, LEVOTHROID) 75 MCG tablet; Take 1 tablet by mouth Daily.  Dispense: 90 tablet; Refill: 3    6. Gastroesophageal reflux disease without esophagitis  -     Discontinue: pantoprazole (PROTONIX) 40 MG EC tablet; Take 1 tablet by mouth Daily.  Dispense: 90 tablet; Refill: 3  -     pantoprazole (PROTONIX) 40 MG EC tablet; Take 1 tablet by mouth Daily.  Dispense: 90 tablet; Refill: 3    Meds as above.  Accidentally sent them to her physical pharmacy rather than her mail order pharmacy initially.    Discussed her fluid balance.  Encouraged her to drink more fluid when she is going to be more active.  Also suggested that she could taper her lisinopril and see how she does over the next several weeks.  Has follow-up with Dr. Conner upcoming.    Continue meds as prescribed otherwise.  Plan for follow-up as below.  Encouraged communication via [employer] in the meantime.      Follow Up {Instructions Charge Capture  Follow-up Communications :23}     Patient was given instructions and counseling regarding her condition or for health maintenance advice. Please see specific information pulled into the AVS (placed there by myself) if appropriate.    Return in about 6 months (around 11/10/2021), or if symptoms worsen or fail to improve.      DOMINIK Goddard MD

## 2021-06-21 ENCOUNTER — OFFICE VISIT (OUTPATIENT)
Dept: CARDIOLOGY | Facility: CLINIC | Age: 76
End: 2021-06-21

## 2021-06-21 VITALS
SYSTOLIC BLOOD PRESSURE: 138 MMHG | HEIGHT: 67 IN | WEIGHT: 180 LBS | DIASTOLIC BLOOD PRESSURE: 72 MMHG | BODY MASS INDEX: 28.25 KG/M2 | HEART RATE: 61 BPM

## 2021-06-21 DIAGNOSIS — Z95.0 PRESENCE OF CARDIAC PACEMAKER: Chronic | ICD-10-CM

## 2021-06-21 DIAGNOSIS — I10 ESSENTIAL HYPERTENSION: Primary | Chronic | ICD-10-CM

## 2021-06-21 DIAGNOSIS — E78.2 MIXED HYPERLIPIDEMIA: Chronic | ICD-10-CM

## 2021-06-21 DIAGNOSIS — I38 VALVULAR HEART DISEASE: Chronic | ICD-10-CM

## 2021-06-21 PROBLEM — G54.0 THORACIC OUTLET SYNDROME ASSOCIATED WITH CERVICAL RIB: Chronic | Status: ACTIVE | Noted: 2020-08-11

## 2021-06-21 PROBLEM — I67.1 CEREBRAL ANEURYSM, NONRUPTURED: Chronic | Status: ACTIVE | Noted: 2019-05-21

## 2021-06-21 PROBLEM — R09.89 RIGHT CAROTID BRUIT: Chronic | Status: ACTIVE | Noted: 2020-08-11

## 2021-06-21 PROBLEM — Q76.5 THORACIC OUTLET SYNDROME ASSOCIATED WITH CERVICAL RIB: Chronic | Status: ACTIVE | Noted: 2020-08-11

## 2021-06-21 PROBLEM — Q76.0 THORACIC OUTLET SYNDROME ASSOCIATED WITH CERVICAL RIB: Chronic | Status: ACTIVE | Noted: 2020-08-11

## 2021-06-21 PROCEDURE — 99214 OFFICE O/P EST MOD 30 MIN: CPT | Performed by: INTERNAL MEDICINE

## 2021-06-21 NOTE — PROGRESS NOTES
"Chief Complaint  Hypertension    Subjective    History of Present Illness      I saw Ms. Morales in the office today. She is a pleasant 76-year-old female with a history of hypertension, hyperlipidemia sick sinus syndrome and is status post pacemaker placement.  She also has a history of a cerebral aneurysm and underwent coiling.  She has a history of chronic kidney disease as well as mild mitral and moderate tricuspid insufficiency.  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  She has occasional right arm numbness.    On her last office visit, her blood pressure was elevated and I added lisinopril 10 mg daily.  She states that she has had periods of hypotension with the lisinopril.  She did decrease the medication in half but continued to have.  Of weakness with the medication.  She finally discontinued the medication approximately 2 weeks ago.  She does check her blood pressure and she states generally her blood pressure has been in the 120 or 130 range.  She states occasionally it will increase to the 150s but returned to normal.  She does have some mild shortness of air but she attributes this to deconditioning.  No chest discomfort.  She has chronic lower extremity edema, unsure if some of this is secondary to her chronic kidney disease and amlodipine.  No palpitations, dizziness or near syncope.    Objective   Vital Signs:   /72   Pulse 61   Ht 170.2 cm (67\")   Wt 81.6 kg (180 lb)   BMI 28.19 kg/m²     Vitals and nursing note reviewed.   Constitutional:       General: Not in acute distress.     Appearance: Healthy appearance. Well-developed and not in distress. Not diaphoretic.   Eyes:      General: No scleral icterus.     Conjunctiva/sclera: Conjunctivae normal.      Pupils: Pupils are equal, round, and reactive to light.   HENT:      Head: Normocephalic and atraumatic.   Neck:      Thyroid: No thyromegaly.      Vascular: Carotid bruit ( Right carotid bruit) present.      " Lymphadenopathy: No cervical adenopathy.   Pulmonary:      Effort: Pulmonary effort is normal. No respiratory distress.      Breath sounds: Normal breath sounds. No wheezing. No rales.   Cardiovascular:      PMI at left midclavicular line. Normal rate. Regular rhythm. Normal S1. Normal S2.      Murmurs:  There is a 1/6 systolic murmur heard at the mid left sternal border      No gallop. No S3 and S4 gallop. No click. No rub.   Pulses:     Intact distal pulses. No decreased pulses.   Edema:     Peripheral edema ( Bilateral lower extremity edema with compression stockings) present.  Abdominal:      General: Bowel sounds are normal. There is no distension.      Palpations: Abdomen is soft. There is no abdominal mass.      Tenderness: There is no abdominal tenderness. There is no guarding or rebound.   Musculoskeletal: Normal range of motion.      Cervical back: Normal range of motion and neck supple. Skin:     General: Skin is warm and dry.      Coloration: Skin is not pale.      Findings: No rash.   Neurological:      Mental Status: Alert, oriented to person, place, and time and oriented to person, place and time.      Coordination: Coordination normal.      Gait: Gait is intact.   Psychiatric:         Behavior: Behavior normal.         Result Review :   The following data was reviewed by: Xochilt Conner MD on 06/21/2021:  Common labs    Common Labsle 9/4/20   Glucose 92   BUN 25   Creatinine 1.19 (A)   eGFR Non  Am 44 (A)   eGFR African Am 51 (A)   Sodium 139   Potassium 4.9   Chloride 98   Calcium 10.0   (A) Abnormal value                      Assessment and Plan    1. Essential hypertension  Stable.  She is going to monitor her blood pressure let me know the readings.    2. Valvular heart disease  Stable.    3. Presence of cardiac pacemaker  Stable    4. Mixed hyperlipidemia  She is on atorvastatin 40 mg daily    Overall Ms. Morales is doing fairly well.  I have not changed her medications.  She will  monitor her blood pressure.  I will check an echocardiogram next year.  She will let me know if any problems develop.        Follow Up   No follow-ups on file.  Patient was given instructions and counseling regarding her condition or for health maintenance advice. Please see specific information pulled into the AVS if appropriate.

## 2021-07-07 ENCOUNTER — TELEPHONE (OUTPATIENT)
Dept: FAMILY MEDICINE CLINIC | Facility: CLINIC | Age: 76
End: 2021-07-07

## 2021-07-07 NOTE — TELEPHONE ENCOUNTER
Patient was being referred out to [facility] by Dr. Price for bone density shots she is wanting to know if you are able to sed her she do not know who she was being sent to. She is also wanting to know If you know where she can go.

## 2021-08-10 ENCOUNTER — TELEPHONE (OUTPATIENT)
Dept: CARDIOLOGY | Facility: CLINIC | Age: 76
End: 2021-08-10

## 2021-08-10 DIAGNOSIS — I10 ESSENTIAL HYPERTENSION: Primary | ICD-10-CM

## 2021-08-10 RX ORDER — FUROSEMIDE 40 MG/1
40 TABLET ORAL DAILY
Qty: 90 TABLET | Refills: 1 | Status: SHIPPED | OUTPATIENT
Start: 2021-08-10 | End: 2022-06-06 | Stop reason: SDUPTHER

## 2021-08-20 PROCEDURE — 93294 REM INTERROG EVL PM/LDLS PM: CPT | Performed by: INTERNAL MEDICINE

## 2021-08-20 PROCEDURE — 93296 REM INTERROG EVL PM/IDS: CPT | Performed by: INTERNAL MEDICINE

## 2021-09-06 DIAGNOSIS — I10 ESSENTIAL HYPERTENSION: ICD-10-CM

## 2021-09-07 RX ORDER — AMLODIPINE BESYLATE 10 MG/1
TABLET ORAL
Qty: 90 TABLET | Refills: 1 | Status: SHIPPED | OUTPATIENT
Start: 2021-09-07 | End: 2022-02-08 | Stop reason: SDUPTHER

## 2021-10-09 DIAGNOSIS — J30.1 CHRONIC ALLERGIC RHINITIS DUE TO POLLEN: ICD-10-CM

## 2021-10-11 RX ORDER — FLUNISOLIDE 0.25 MG/ML
SOLUTION NASAL
Qty: 100 ML | Refills: 1 | Status: SHIPPED | OUTPATIENT
Start: 2021-10-11 | End: 2023-02-10 | Stop reason: SDUPTHER

## 2021-10-11 RX ORDER — AZELASTINE HCL 205.5 UG/1
SPRAY NASAL
Qty: 120 ML | Refills: 1 | Status: SHIPPED | OUTPATIENT
Start: 2021-10-11 | End: 2022-08-09 | Stop reason: SDUPTHER

## 2021-11-11 RX ORDER — ROPINIROLE 1 MG/1
1 TABLET, FILM COATED ORAL 3 TIMES DAILY
Qty: 90 TABLET | Refills: 0 | Status: SHIPPED | OUTPATIENT
Start: 2021-11-11 | End: 2021-12-01

## 2021-11-11 NOTE — TELEPHONE ENCOUNTER
Caller: Meme Morales    Relationship: Self    Best call back number: [phone]     Requested Prescriptions:   Requested Prescriptions     Pending Prescriptions Disp Refills   • rOPINIRole (REQUIP) 1 MG tablet       Sig: Take 1 tablet by mouth 3 (Three) Times a Day.        Pharmacy where request should be sent:  [facility] - [facility] [address] [facility] - [phone]  - [phone] FX  [phone]    Additional details provided by patient:     Does the patient have less than a 3 day supply:  [] Yes  [x] No    Matty Loco Rep   11/11/21 10:02 EST

## 2021-11-19 PROCEDURE — 93294 REM INTERROG EVL PM/LDLS PM: CPT | Performed by: INTERNAL MEDICINE

## 2021-11-19 PROCEDURE — 93296 REM INTERROG EVL PM/IDS: CPT | Performed by: INTERNAL MEDICINE

## 2021-12-01 RX ORDER — ROPINIROLE 1 MG/1
1 TABLET, FILM COATED ORAL 3 TIMES DAILY
Qty: 90 TABLET | Refills: 1 | Status: SHIPPED | OUTPATIENT
Start: 2021-12-01 | End: 2022-02-08 | Stop reason: SDUPTHER

## 2021-12-04 NOTE — PROGRESS NOTES
"Chief Complaint  Hypertension    Subjective    History of Present Illness      I saw Ms. Morales in the office today. She is a pleasant 76-year-old female with a history of hypertension, hyperlipidemia sick sinus syndrome and is status post pacemaker placement.  She also has a history of a cerebral aneurysm and underwent coiling.  She has a history of chronic kidney disease as well as mild mitral and moderate tricuspid insufficiency.  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  She has occasional right arm numbness.     Meme is doing fairly well from a cardiac standpoint.  Her blood pressure has been controlled.  She is not complaining of shortness of air.  She does have some lower extremity edema at the end of the day but this is generally resolved by morning.  No orthopnea or paroxysmal nocturnal dyspnea.  No chest tightness.  She has palpitations which are stable.  No dizziness or near syncope.    Objective   Vital Signs:   /70   Pulse 62   Ht 170.2 cm (67\")   Wt 79.4 kg (175 lb)   BMI 27.41 kg/m²     Vitals and nursing note reviewed.   Constitutional:       General: Not in acute distress.     Appearance: Healthy appearance. Well-developed and not in distress. Not diaphoretic.   Eyes:      General: No scleral icterus.     Conjunctiva/sclera: Conjunctivae normal.      Pupils: Pupils are equal, round, and reactive to light.   HENT:      Head: Normocephalic and atraumatic.   Neck:      Thyroid: No thyromegaly.      Vascular: Carotid bruit ( Right carotid bruit) present.      Lymphadenopathy: No cervical adenopathy.   Pulmonary:      Effort: Pulmonary effort is normal. No respiratory distress.      Breath sounds: Normal breath sounds. No wheezing. No rales.   Cardiovascular:      PMI at left midclavicular line. Normal rate. Regular rhythm. Normal S1. Normal S2.      Murmurs:  There is a 1/6 systolic murmur heard at the mid left sternal border      No gallop. No S3 and S4 gallop. " No click. No rub.   Pulses:     Intact distal pulses. No decreased pulses.   Edema:     Peripheral edema ( Bilateral lower extremity edema with compression stockings) present.  Abdominal:      General: Bowel sounds are normal. There is no distension.      Palpations: Abdomen is soft. There is no abdominal mass.      Tenderness: There is no abdominal tenderness. There is no guarding or rebound.   Musculoskeletal: Normal range of motion.      Cervical back: Normal range of motion and neck supple. Skin:     General: Skin is warm and dry.      Coloration: Skin is not pale.      Findings: No rash.   Neurological:      Mental Status: Alert, oriented to person, place, and time and oriented to person, place and time.      Coordination: Coordination normal.      Gait: Gait is intact.   Psychiatric:         Behavior: Behavior normal.         Result Review :   The following data was reviewed by: Xochilt Conner MD on 12/06/2021:                          Assessment and Plan    1. Valvular heart disease  She had an echocardiogram and August 2019.  She had mild mitral insufficiency, mild calcification of the aortic valve and moderate tricuspid insufficiency.  Her ejection fraction was normal.  I will plan on repeating echocardiogram in 2022 for ongoing surveillance of her valves.    2. Presence of cardiac pacemaker  Interrogation of her device demonstrated normal function.  No significant arrhythmias were noted on the pacemaker.  She has 5.5 years remaining on her battery    3. Essential hypertension  Controlled    4. Mixed hyperlipidemia  She is on atorvastatin 40 mg daily.  Her most recent lipid profile was August 2020.  I have placed an order for her to have a lipid profile and CMP.  She plans on getting this done within the next 1 to 2 weeks.    Overall, Ms. Morales is doing well.  She has lost 5 pounds since her previous visit.  She is staying active.  She does care for her  who has some chronic medical issues.         Follow Up   No follow-ups on file.  Patient was given instructions and counseling regarding her condition or for health maintenance advice. Please see specific information pulled into the AVS if appropriate.

## 2021-12-06 ENCOUNTER — OFFICE VISIT (OUTPATIENT)
Dept: CARDIOLOGY | Facility: CLINIC | Age: 76
End: 2021-12-06

## 2021-12-06 VITALS
BODY MASS INDEX: 27.47 KG/M2 | SYSTOLIC BLOOD PRESSURE: 124 MMHG | DIASTOLIC BLOOD PRESSURE: 70 MMHG | WEIGHT: 175 LBS | HEART RATE: 62 BPM | HEIGHT: 67 IN

## 2021-12-06 DIAGNOSIS — I38 VALVULAR HEART DISEASE: Primary | Chronic | ICD-10-CM

## 2021-12-06 DIAGNOSIS — E78.2 MIXED HYPERLIPIDEMIA: Chronic | ICD-10-CM

## 2021-12-06 DIAGNOSIS — Z95.0 PRESENCE OF CARDIAC PACEMAKER: Chronic | ICD-10-CM

## 2021-12-06 DIAGNOSIS — I10 ESSENTIAL HYPERTENSION: Chronic | ICD-10-CM

## 2021-12-06 PROCEDURE — 99214 OFFICE O/P EST MOD 30 MIN: CPT | Performed by: INTERNAL MEDICINE

## 2021-12-10 ENCOUNTER — LAB (OUTPATIENT)
Dept: LAB | Facility: HOSPITAL | Age: 76
End: 2021-12-10

## 2021-12-10 DIAGNOSIS — E78.2 MIXED HYPERLIPIDEMIA: Chronic | ICD-10-CM

## 2021-12-10 DIAGNOSIS — I10 ESSENTIAL HYPERTENSION: Chronic | ICD-10-CM

## 2021-12-10 LAB
ALBUMIN SERPL-MCNC: 4.3 G/DL (ref 3.5–5.2)
ALBUMIN/GLOB SERPL: 1.5 G/DL
ALP SERPL-CCNC: 80 U/L (ref 39–117)
ALT SERPL W P-5'-P-CCNC: 16 U/L (ref 1–33)
ANION GAP SERPL CALCULATED.3IONS-SCNC: 8.1 MMOL/L (ref 5–15)
AST SERPL-CCNC: 18 U/L (ref 1–32)
BILIRUB SERPL-MCNC: 0.5 MG/DL (ref 0–1.2)
BUN SERPL-MCNC: 28 MG/DL (ref 8–23)
BUN/CREAT SERPL: 29.8 (ref 7–25)
CALCIUM SPEC-SCNC: 9.9 MG/DL (ref 8.6–10.5)
CHLORIDE SERPL-SCNC: 106 MMOL/L (ref 98–107)
CHOLEST SERPL-MCNC: 144 MG/DL (ref 0–200)
CO2 SERPL-SCNC: 25.9 MMOL/L (ref 22–29)
CREAT SERPL-MCNC: 0.94 MG/DL (ref 0.57–1)
GFR SERPL CREATININE-BSD FRML MDRD: 58 ML/MIN/1.73
GLOBULIN UR ELPH-MCNC: 2.9 GM/DL
GLUCOSE SERPL-MCNC: 94 MG/DL (ref 65–99)
HDLC SERPL-MCNC: 82 MG/DL (ref 40–60)
LDLC SERPL CALC-MCNC: 50 MG/DL (ref 0–100)
LDLC/HDLC SERPL: 0.62 {RATIO}
POTASSIUM SERPL-SCNC: 4.6 MMOL/L (ref 3.5–5.2)
PROT SERPL-MCNC: 7.2 G/DL (ref 6–8.5)
SODIUM SERPL-SCNC: 140 MMOL/L (ref 136–145)
TRIGL SERPL-MCNC: 54 MG/DL (ref 0–150)
VLDLC SERPL-MCNC: 12 MG/DL (ref 5–40)

## 2021-12-10 PROCEDURE — 36415 COLL VENOUS BLD VENIPUNCTURE: CPT

## 2021-12-10 PROCEDURE — 80061 LIPID PANEL: CPT

## 2021-12-10 PROCEDURE — 80053 COMPREHEN METABOLIC PANEL: CPT

## 2022-02-08 ENCOUNTER — TELEPHONE (OUTPATIENT)
Dept: FAMILY MEDICINE CLINIC | Facility: CLINIC | Age: 77
End: 2022-02-08

## 2022-02-08 DIAGNOSIS — I10 ESSENTIAL HYPERTENSION: ICD-10-CM

## 2022-02-08 RX ORDER — AMLODIPINE BESYLATE 10 MG/1
10 TABLET ORAL DAILY
Qty: 90 TABLET | Refills: 1 | Status: SHIPPED | OUTPATIENT
Start: 2022-02-08 | End: 2022-04-19

## 2022-02-08 RX ORDER — ROPINIROLE 1 MG/1
1 TABLET, FILM COATED ORAL 3 TIMES DAILY
Qty: 30 TABLET | Refills: 0 | Status: SHIPPED | OUTPATIENT
Start: 2022-02-08 | End: 2022-03-28 | Stop reason: SDUPTHER

## 2022-02-08 NOTE — TELEPHONE ENCOUNTER
PATIENT CALLED AND STATES [facility] CALLED THE OFFICE FOR A REFILL AND THE MEDICATION WAS DENIED. SHE DOES NOT KNOW WHICH MEDICATION WAS DENIED.     SHE RECEIVED AN EMAIL. SHE HAD REQUESTED     levothyroxine (SYNTHROID, LEVOTHROID) 75 MCG tablet    montelukast (Singulair) 10 MG tablet    atorvastatin (LIPITOR) 40 MG tablet    THIS IS A NEW PHARMACY FOR HER.     [facility] - [facility] [address] [place] [address], [address] - [phone]  - [phone]   [phone]    CALL BACK NUMBER [phone]

## 2022-02-08 NOTE — TELEPHONE ENCOUNTER
[facility] requesting a new RX for Norvasc. RX sent. Patient is up to date with appointments with provider

## 2022-02-18 PROCEDURE — 93296 REM INTERROG EVL PM/IDS: CPT | Performed by: INTERNAL MEDICINE

## 2022-02-18 PROCEDURE — 93294 REM INTERROG EVL PM/LDLS PM: CPT | Performed by: INTERNAL MEDICINE

## 2022-03-22 DIAGNOSIS — E03.9 ACQUIRED HYPOTHYROIDISM: ICD-10-CM

## 2022-03-22 DIAGNOSIS — E78.2 MIXED HYPERLIPIDEMIA: ICD-10-CM

## 2022-03-22 DIAGNOSIS — K21.9 GASTROESOPHAGEAL REFLUX DISEASE WITHOUT ESOPHAGITIS: ICD-10-CM

## 2022-03-22 DIAGNOSIS — J30.1 CHRONIC ALLERGIC RHINITIS DUE TO POLLEN: ICD-10-CM

## 2022-03-23 RX ORDER — PANTOPRAZOLE SODIUM 40 MG/1
40 TABLET, DELAYED RELEASE ORAL DAILY
Qty: 90 TABLET | Refills: 0 | Status: SHIPPED | OUTPATIENT
Start: 2022-03-23 | End: 2022-06-16

## 2022-03-23 RX ORDER — LEVOTHYROXINE SODIUM 0.07 MG/1
75 TABLET ORAL DAILY
Qty: 90 TABLET | Refills: 0 | Status: SHIPPED | OUTPATIENT
Start: 2022-03-23 | End: 2022-06-16

## 2022-03-23 RX ORDER — MONTELUKAST SODIUM 10 MG/1
10 TABLET ORAL EVERY 24 HOURS
Qty: 90 TABLET | Refills: 0 | Status: SHIPPED | OUTPATIENT
Start: 2022-03-23 | End: 2022-06-16

## 2022-03-23 RX ORDER — ATORVASTATIN CALCIUM 40 MG/1
40 TABLET, FILM COATED ORAL DAILY
Qty: 90 TABLET | Refills: 0 | Status: SHIPPED | OUTPATIENT
Start: 2022-03-23 | End: 2022-06-16

## 2022-03-28 RX ORDER — ROPINIROLE 1 MG/1
1 TABLET, FILM COATED ORAL 3 TIMES DAILY
Qty: 60 TABLET | Refills: 0 | Status: SHIPPED | OUTPATIENT
Start: 2022-03-28 | End: 2022-04-13 | Stop reason: SDUPTHER

## 2022-03-28 NOTE — TELEPHONE ENCOUNTER
Caller: Meme Morales    Relationship: Self    Best call back number: [phone] (M)      Requested Prescriptions:   Requested Prescriptions     Pending Prescriptions Disp Refills   • rOPINIRole (REQUIP) 1 MG tablet 30 tablet 0     Sig: Take 1 tablet by mouth 3 (Three) Times a Day. NEEDS APPOINTMENT FOR MORE REFILLS        Pharmacy where request should be sent:  [facility] - [address], [address] - [phone]  - [phone] FX    Additional details provided by patient: PATIENT HAS 1 TABLET LEFT. SHE IS ASKING FOR AN EMERGENCY SUPPLY TO GO TO HER LOCAL [facility]:  [facility] [address] AT [facility] - [phone] [facility] [phone]   [phone]    HE STILL NEEDS HER FULL REFILL SENT BY MAIL      Does the patient have less than a 3 day supply:  [x] Yes  [] No    KEVIN Valenzuela   03/28/22 08:10 EDT

## 2022-04-13 RX ORDER — ROPINIROLE 1 MG/1
1 TABLET, FILM COATED ORAL 3 TIMES DAILY
Qty: 60 TABLET | Refills: 0 | Status: SHIPPED | OUTPATIENT
Start: 2022-04-13 | End: 2022-04-19 | Stop reason: SDUPTHER

## 2022-04-19 ENCOUNTER — OFFICE VISIT (OUTPATIENT)
Dept: FAMILY MEDICINE CLINIC | Facility: CLINIC | Age: 77
End: 2022-04-19

## 2022-04-19 VITALS
BODY MASS INDEX: 27.41 KG/M2 | WEIGHT: 175 LBS | RESPIRATION RATE: 20 BRPM | SYSTOLIC BLOOD PRESSURE: 120 MMHG | DIASTOLIC BLOOD PRESSURE: 70 MMHG | HEART RATE: 68 BPM | OXYGEN SATURATION: 99 %

## 2022-04-19 DIAGNOSIS — G25.81 RESTLESS LEGS SYNDROME (RLS): Primary | ICD-10-CM

## 2022-04-19 DIAGNOSIS — M18.0 ARTHRITIS OF CARPOMETACARPAL (CMC) JOINT OF BOTH THUMBS: ICD-10-CM

## 2022-04-19 DIAGNOSIS — R10.11 ABDOMINAL WALL PAIN IN RIGHT UPPER QUADRANT: ICD-10-CM

## 2022-04-19 PROCEDURE — 99214 OFFICE O/P EST MOD 30 MIN: CPT | Performed by: FAMILY MEDICINE

## 2022-04-19 RX ORDER — ROPINIROLE 1 MG/1
1 TABLET, FILM COATED ORAL 3 TIMES DAILY
Qty: 270 TABLET | Refills: 3 | Status: SHIPPED | OUTPATIENT
Start: 2022-04-19 | End: 2022-05-06 | Stop reason: SDUPTHER

## 2022-04-19 RX ORDER — LISINOPRIL AND HYDROCHLOROTHIAZIDE 12.5; 1 MG/1; MG/1
TABLET ORAL
COMMUNITY
Start: 2022-03-22 | End: 2022-12-06 | Stop reason: SDUPTHER

## 2022-04-19 NOTE — PROGRESS NOTES
Chief Complaint  Med Management and Abdominal Pain (Under R rib cage x1 month)    Subjective    History of Present Illness {CC  Problem List  Visit  Diagnosis   Encounters  Notes  Medications  Labs  Result Review Imaging  Media :23}     Meme Morales presents to [facility] for Med Management and Abdominal Pain (Under R rib cage x1 month).  History of Present Illness     Here today for follow-up as above. Doing fairly well overall. Needs a refill of her ropinirole for restless leg syndrome. Has been taking this for quite some time with overall good relief of her symptoms.    Has noticed some pain in her right upper quadrant after moving her . He is rather dependent on her care and can be rather heavy. She has not felt any bulge in the area but felt that maybe she pulled a muscle.    Has bilateral thumb pain that is become significantly more bothersome. It is interfering with some of her care of her .  had a similar problem in the past, got both thumbs injected. She points to her first CMC joint bilateral.    Objective     Vital Signs:   /70   Pulse 68   Resp 20   Wt 79.4 kg (175 lb)   SpO2 99%   BMI 27.41 kg/m²   Physical Exam  Vitals and nursing note reviewed.   Constitutional:       General: She is not in acute distress.     Appearance: Normal appearance. She is not ill-appearing.   Cardiovascular:      Rate and Rhythm: Normal rate and regular rhythm.      Pulses: Normal pulses.      Heart sounds: Normal heart sounds. No murmur heard.  Pulmonary:      Effort: Pulmonary effort is normal. No respiratory distress.      Breath sounds: Normal breath sounds. No rales.   Abdominal:      General: Abdomen is flat. Bowel sounds are normal. There is no distension.      Palpations: Abdomen is soft.      Tenderness: There is abdominal tenderness. There is no guarding or rebound.      Hernia: No hernia is present.   Musculoskeletal:      Comments: Pain  over the first CMC joint bilateral.   Neurological:      Mental Status: She is alert and oriented to person, place, and time. Mental status is at baseline.   Psychiatric:         Mood and Affect: Mood normal.         Behavior: Behavior normal.          Result Review  Data Reviewed:{ Labs  Result Review  Imaging  Med Tab  Media :23}                   Assessment and Plan {CC Problem List  Visit Diagnosis  ROS  Review (Popup)  Health Maintenance  Quality  BestPractice  Medications  SmartSets  SnapShot Encounters  Media :23}   Diagnoses and all orders for this visit:    1. Restless legs syndrome (RLS) (Primary)  -     rOPINIRole (REQUIP) 1 MG tablet; Take 1 tablet by mouth 3 (Three) Times a Day.  Dispense: 270 tablet; Refill: 3    2. Arthritis of carpometacarpal (CMC) joint of both thumbs  -     Ambulatory Referral to Orthopedic Surgery    3. Abdominal wall pain in right upper quadrant    Orders as above. Abdominal wall pain seems to be a strain at best. Should improve with time. Discussed giving at rest if possible.      Follow Up {Instructions Charge Capture  Follow-up Communications :23}     Patient was given instructions and counseling regarding her condition or for health maintenance advice. Please see specific information pulled into the AVS (placed there by myself) if appropriate.    Return in about 3 months (around 7/19/2022) for Medicare Wellness.      DOMINIK Goddard MD

## 2022-05-06 DIAGNOSIS — G25.81 RESTLESS LEGS SYNDROME (RLS): ICD-10-CM

## 2022-05-06 RX ORDER — ROPINIROLE 1 MG/1
1 TABLET, FILM COATED ORAL 3 TIMES DAILY
Qty: 30 TABLET | Refills: 0 | Status: SHIPPED | OUTPATIENT
Start: 2022-05-06 | End: 2022-05-13 | Stop reason: SDUPTHER

## 2022-05-13 DIAGNOSIS — G25.81 RESTLESS LEGS SYNDROME (RLS): ICD-10-CM

## 2022-05-13 RX ORDER — ROPINIROLE 1 MG/1
1 TABLET, FILM COATED ORAL 3 TIMES DAILY
Qty: 270 TABLET | Refills: 1 | Status: SHIPPED | OUTPATIENT
Start: 2022-05-13

## 2022-05-13 NOTE — TELEPHONE ENCOUNTER
Caller: Meme Morales    Relationship: Self    Best call back number: [phone]    Requested Prescriptions:       rOPINIRole (REQUIP) 1 MG tablet     Pharmacy where request should be sent:    [facility] [address] [facility] - [phone]  - [phone] FX  [phone]  Additional details provided by patient:PATIENT IS CALLING TO REQUEST A REFILL FOR THE ABOVE MEDICATION.  SHE STATES SHE WAS PRESCRIBED A 10 DAY SUPPLY BY DR. VAZQUEZ WHEN DR. RIVERS WAS OUT, BUT IS ALMOST OUT OF THEM.      Does the patient have less than a 3 day supply:  [x] Yes  [] No    Miriam Pittman, Tammyed Rep   05/13/22 12:20 EDT     PLEASE ADVISE.

## 2022-05-13 NOTE — TELEPHONE ENCOUNTER
Last OV: 4/19/2022    Next OV: not scheduled  (not overdue till 7/19/2022)    Last Refill: 5/6/2022

## 2022-05-20 PROCEDURE — 93296 REM INTERROG EVL PM/IDS: CPT | Performed by: INTERNAL MEDICINE

## 2022-05-20 PROCEDURE — 93294 REM INTERROG EVL PM/LDLS PM: CPT | Performed by: INTERNAL MEDICINE

## 2022-06-06 ENCOUNTER — OFFICE VISIT (OUTPATIENT)
Dept: CARDIOLOGY | Facility: CLINIC | Age: 77
End: 2022-06-06

## 2022-06-06 VITALS
HEART RATE: 60 BPM | HEIGHT: 67 IN | SYSTOLIC BLOOD PRESSURE: 112 MMHG | BODY MASS INDEX: 27.15 KG/M2 | WEIGHT: 173 LBS | DIASTOLIC BLOOD PRESSURE: 60 MMHG

## 2022-06-06 DIAGNOSIS — I10 ESSENTIAL HYPERTENSION: Primary | Chronic | ICD-10-CM

## 2022-06-06 DIAGNOSIS — R06.09 DYSPNEA ON EXERTION: ICD-10-CM

## 2022-06-06 DIAGNOSIS — I67.1 CEREBRAL ANEURYSM, NONRUPTURED: Chronic | ICD-10-CM

## 2022-06-06 DIAGNOSIS — E78.2 MIXED HYPERLIPIDEMIA: Chronic | ICD-10-CM

## 2022-06-06 DIAGNOSIS — Z95.0 PRESENCE OF CARDIAC PACEMAKER: Chronic | ICD-10-CM

## 2022-06-06 DIAGNOSIS — I47.1 ATRIAL TACHYCARDIA: Chronic | ICD-10-CM

## 2022-06-06 PROBLEM — I47.19 ATRIAL TACHYCARDIA: Status: ACTIVE | Noted: 2022-06-06

## 2022-06-06 PROBLEM — I47.19 ATRIAL TACHYCARDIA: Chronic | Status: ACTIVE | Noted: 2022-06-06

## 2022-06-06 PROCEDURE — 99215 OFFICE O/P EST HI 40 MIN: CPT | Performed by: INTERNAL MEDICINE

## 2022-06-06 RX ORDER — FUROSEMIDE 40 MG/1
40 TABLET ORAL DAILY
Qty: 90 TABLET | Refills: 1 | Status: SHIPPED | OUTPATIENT
Start: 2022-06-06 | End: 2023-03-28

## 2022-06-06 NOTE — PROGRESS NOTES
"Chief Complaint  Cardiac Valve Problem    Subjective    History of Present Illness      I saw Ms. Morales in the office today. She is a pleasant 77-year-old female with a history of hypertension, hyperlipidemia, sick sinus syndrome and is status post pacemaker placement.  She also has a history of a cerebral aneurysm and underwent coiling.  She has a history of chronic kidney disease as well as mild mitral and moderate tricuspid insufficiency.  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  She has occasional right arm numbness.     Ms. Morales is in the office today.  She is not feeling very well.  She is having shortness of air and episodes of orthostasis.  She did bring in a list of her blood pressure readings.  She has systolic pressures as low as 70 mmHg with standing intermittently.  She states that Dr. Baptiste discontinued her amlodipine and placed her on lisinopril/HCT.  She feels like her blood pressure is dropping more frequently.  She is at the point that she is only taking half a tablet daily.  No chest discomfort.  No significant lower extremity edema.  She is on as needed Lasix and states that she takes medication approximately 1 day a week.  She is having some vague headaches.  She has had previous cerebral artery aneurysm coiling.  She has not had follow-up since 2018 and she states that she is becoming concerned.       Objective   Vital Signs:   /60   Pulse 60   Ht 170.2 cm (67\")   Wt 78.5 kg (173 lb)   BMI 27.10 kg/m²     Vitals and nursing note reviewed.   Constitutional:       General: Not in acute distress.     Appearance: Healthy appearance. Well-developed and not in distress. Not diaphoretic.   Eyes:      General: No scleral icterus.     Conjunctiva/sclera: Conjunctivae normal.      Pupils: Pupils are equal, round, and reactive to light.   HENT:      Head: Normocephalic and atraumatic.   Neck:      Thyroid: No thyromegaly.      Vascular: Carotid bruit ( Right " carotid bruit) present.      Lymphadenopathy: No cervical adenopathy.   Pulmonary:      Effort: Pulmonary effort is normal. No respiratory distress.      Breath sounds: Normal breath sounds. No wheezing. No rales.   Cardiovascular:      PMI at left midclavicular line. Normal rate. Regular rhythm. Normal S1. Normal S2.      Murmurs:  There is a 1/6 systolic murmur heard at the mid left sternal border      No gallop. No S3 and S4 gallop. No click. No rub.   Pulses:     Intact distal pulses. No decreased pulses.   Edema:     Peripheral edema ( Bilateral lower extremity edema with compression stockings) present.  Abdominal:      General: Bowel sounds are normal. There is no distension.      Palpations: Abdomen is soft. There is no abdominal mass.      Tenderness: There is no abdominal tenderness. There is no guarding or rebound.   Musculoskeletal: Normal range of motion.      Cervical back: Normal range of motion and neck supple. Skin:     General: Skin is warm and dry.      Coloration: Skin is not pale.      Findings: No rash.   Neurological:      Mental Status: Alert, oriented to person, place, and time and oriented to person, place and time.      Coordination: Coordination normal.      Gait: Gait is intact.   Psychiatric:         Behavior: Behavior normal.         Result Review :       Component   Ref Range & Units 5 mo ago   (12/10/21) 1 yr ago   (9/4/20) 2 yr ago   (6/12/19) 3 yr ago   (11/26/18) 3 yr ago   (11/19/18)   Glucose   65 - 99 mg/dL 94  92  85  142 High  R  118 High     BUN   8 - 23 mg/dL 28 High   25 R  26 High    26 High     Creatinine   0.57 - 1.00 mg/dL 0.94  1.19 High   0.98   1.09 High     Sodium   136 - 145 mmol/L 140  139 R  139   140    Potassium   3.5 - 5.2 mmol/L 4.6  4.9  4.5   4.0    Chloride   98 - 107 mmol/L 106  98 R  102   99    CO2   22.0 - 29.0 mmol/L 25.9   26.6   28.9    Calcium   8.6 - 10.5 mg/dL 9.9  10.0 R  9.9   10.2    Total Protein   6.0 - 8.5 g/dL 7.2   6.9      Albumin   3.50  - 5.20 g/dL 4.30   4.20      ALT (SGPT)   1 - 33 U/L 16   16      AST (SGOT)   1 - 32 U/L 18   19      Alkaline Phosphatase   39 - 117 U/L 80   62      Total Bilirubin   0.0 - 1.2 mg/dL 0.5   0.4 R      eGFR Non African Amer   >60 mL/min/1.73 58 Low   44 Low  R  55 Low    49 Low     Globulin   gm/dL 2.9   2.7      A/G Ratio   g/dL 1.5   1.6      BUN/Creatinine Ratio   7.0 - 25.0 29.8 High   21 R  26.5 High    23.9    Anion Gap   5.0 - 15.0 mmol/L 8.1   10.4 R   12.1 R    Resulting Agency [facility]  [facility]  [facility]             Narrative  Performed by: [facility]  GFR Normal >60            Component   Ref Range & Units 5 mo ago    Total Cholesterol   0 - 200 mg/dL 144    Triglycerides   0 - 150 mg/dL 54    HDL Cholesterol   40 - 60 mg/dL 82 High     LDL Cholesterol    0 - 100 mg/dL 50    VLDL Cholesterol   5 - 40 mg/dL 12    LDL/HDL Ratio  0.62    Resulting Agency [facility]             Narrative  Performed by: [facility]  Cholesterol Reference Ranges   (U.S. Department of Health and Human Services ATP III Classifications)     Desirable          <200 mg/dL               She did have lab work at Dr. Jain office and March 2022.  Her hemoglobin was 11.9.  Her BUN was 31 and creatinine 1.05.  Potassium 5.0       She did have lab work at Dr. Walker's office and March 2022.  Her hemoglobin was 11.9.  Her BUN was 31 and creatinine 1.05.  Potassium 5.0     Assessment and Plan    1. Essential hypertension  She is having episodes of orthostasis with blood pressures dropping to 70 mmHg.  She is currently taking one half of her lisinopril hydrochlorothiazide, so she is essentially taking 5 mg- 6.25 daily.  She is going to continue to monitor her blood pressure.    2. Mixed hyperlipidemia  She is on atorvastatin 40 mg daily.  Her LDL a few months ago was 50    3. Atrial tachycardia (HCC)  Atrial tachycardia was noted on her pacemaker.  It was 11 seconds.  No change in treatment    4. Presence of  cardiac pacemaker  Interrogation of her device demonstrates normal function.    5. Cerebral aneurysm, nonruptured  She does have a history of coiling of a cerebral artery aneurysm.  She has not been followed up.  Her neurosurgeon has retired.  She is having some headaches.  We will order a cerebral angiogram    She is having some shortness of air.  I am going to repeat her echocardiogram.  She has not had an ischemic assessment in several years so we will order a nuclear stress test as well.      I spent 68 minutes caring for Meme on this date of service. This time includes time spent by me in the following activities:preparing for the visit, reviewing tests, obtaining and/or reviewing a separately obtained history, performing a medically appropriate examination and/or evaluation , counseling and educating the patient/family/caregiver, ordering medications, tests, or procedures, referring and communicating with other health care professionals , documenting information in the medical record, independently interpreting results and communicating that information with the patient/family/caregiver and care coordination  Follow Up   No follow-ups on file.  Patient was given instructions and counseling regarding her condition or for health maintenance advice. Please see specific information pulled into the AVS if appropriate.

## 2022-06-16 DIAGNOSIS — K21.9 GASTROESOPHAGEAL REFLUX DISEASE WITHOUT ESOPHAGITIS: ICD-10-CM

## 2022-06-16 DIAGNOSIS — E03.9 ACQUIRED HYPOTHYROIDISM: ICD-10-CM

## 2022-06-16 DIAGNOSIS — E78.2 MIXED HYPERLIPIDEMIA: ICD-10-CM

## 2022-06-16 DIAGNOSIS — J30.1 CHRONIC ALLERGIC RHINITIS DUE TO POLLEN: ICD-10-CM

## 2022-06-16 RX ORDER — ATORVASTATIN CALCIUM 40 MG/1
40 TABLET, FILM COATED ORAL DAILY
Qty: 90 TABLET | Refills: 3 | Status: SHIPPED | OUTPATIENT
Start: 2022-06-16

## 2022-06-16 RX ORDER — LEVOTHYROXINE SODIUM 0.07 MG/1
75 TABLET ORAL DAILY
Qty: 90 TABLET | Refills: 3 | Status: SHIPPED | OUTPATIENT
Start: 2022-06-16

## 2022-06-16 RX ORDER — PANTOPRAZOLE SODIUM 40 MG/1
40 TABLET, DELAYED RELEASE ORAL DAILY
Qty: 90 TABLET | Refills: 3 | Status: SHIPPED | OUTPATIENT
Start: 2022-06-16

## 2022-06-16 RX ORDER — MONTELUKAST SODIUM 10 MG/1
10 TABLET ORAL EVERY 24 HOURS
Qty: 90 TABLET | Refills: 3 | Status: SHIPPED | OUTPATIENT
Start: 2022-06-16

## 2022-06-17 ENCOUNTER — HOSPITAL ENCOUNTER (OUTPATIENT)
Dept: NUCLEAR MEDICINE | Facility: HOSPITAL | Age: 77
Discharge: HOME | End: 2022-06-17

## 2022-06-17 ENCOUNTER — HOSPITAL ENCOUNTER (OUTPATIENT)
Dept: CARDIOLOGY | Facility: HOSPITAL | Age: 77
Discharge: HOME | End: 2022-06-17
Admitting: INTERNAL MEDICINE

## 2022-06-17 VITALS
BODY MASS INDEX: 27.15 KG/M2 | SYSTOLIC BLOOD PRESSURE: 161 MMHG | DIASTOLIC BLOOD PRESSURE: 69 MMHG | WEIGHT: 173 LBS | HEIGHT: 67 IN | HEART RATE: 60 BPM

## 2022-06-17 DIAGNOSIS — R06.09 DYSPNEA ON EXERTION: ICD-10-CM

## 2022-06-17 LAB
BH CV ECHO MEAS - ACS: 1.38 CM
BH CV ECHO MEAS - AO MAX PG: 12.6 MMHG
BH CV ECHO MEAS - AO MEAN PG: 6.8 MMHG
BH CV ECHO MEAS - AO ROOT DIAM: 2.34 CM
BH CV ECHO MEAS - AO V2 MAX: 177.4 CM/SEC
BH CV ECHO MEAS - AO V2 VTI: 44.2 CM
BH CV ECHO MEAS - AVA(I,D): 2.5 CM2
BH CV ECHO MEAS - EDV(CUBED): 96.3 ML
BH CV ECHO MEAS - EDV(MOD-SP2): 45 ML
BH CV ECHO MEAS - EDV(MOD-SP4): 62 ML
BH CV ECHO MEAS - EF(MOD-BP): 62 %
BH CV ECHO MEAS - EF(MOD-SP2): 62.2 %
BH CV ECHO MEAS - EF(MOD-SP4): 61.3 %
BH CV ECHO MEAS - ESV(CUBED): 23.4 ML
BH CV ECHO MEAS - ESV(MOD-SP2): 17 ML
BH CV ECHO MEAS - ESV(MOD-SP4): 24 ML
BH CV ECHO MEAS - FS: 37.6 %
BH CV ECHO MEAS - IVS/LVPW: 0.63 CM
BH CV ECHO MEAS - IVSD: 0.76 CM
BH CV ECHO MEAS - LA DIMENSION: 3.6 CM
BH CV ECHO MEAS - LAT PEAK E' VEL: 7.8 CM/SEC
BH CV ECHO MEAS - LV DIASTOLIC VOL/BSA (35-75): 33.2 CM2
BH CV ECHO MEAS - LV MASS(C)D: 153.6 GRAMS
BH CV ECHO MEAS - LV MAX PG: 5 MMHG
BH CV ECHO MEAS - LV MEAN PG: 2.48 MMHG
BH CV ECHO MEAS - LV SYSTOLIC VOL/BSA (12-30): 12.9 CM2
BH CV ECHO MEAS - LV V1 MAX: 111.4 CM/SEC
BH CV ECHO MEAS - LV V1 VTI: 28.9 CM
BH CV ECHO MEAS - LVIDD: 4.6 CM
BH CV ECHO MEAS - LVIDS: 2.9 CM
BH CV ECHO MEAS - LVOT AREA: 3.9 CM2
BH CV ECHO MEAS - LVOT DIAM: 2.21 CM
BH CV ECHO MEAS - LVPWD: 1.2 CM
BH CV ECHO MEAS - MED PEAK E' VEL: 7.3 CM/SEC
BH CV ECHO MEAS - MR MAX PG: 114.4 MMHG
BH CV ECHO MEAS - MR MAX VEL: 534.8 CM/SEC
BH CV ECHO MEAS - MV A MAX VEL: 103.3 CM/SEC
BH CV ECHO MEAS - MV DEC SLOPE: 295.2 CM/SEC2
BH CV ECHO MEAS - MV DEC TIME: 232 MSEC
BH CV ECHO MEAS - MV E MAX VEL: 84.4 CM/SEC
BH CV ECHO MEAS - MV E/A: 0.82
BH CV ECHO MEAS - MV MAX PG: 5.2 MMHG
BH CV ECHO MEAS - MV MEAN PG: 2.22 MMHG
BH CV ECHO MEAS - MV P1/2T: 89.5 MSEC
BH CV ECHO MEAS - MV V2 VTI: 37.5 CM
BH CV ECHO MEAS - MVA(P1/2T): 2.46 CM2
BH CV ECHO MEAS - MVA(VTI): 3 CM2
BH CV ECHO MEAS - PA ACC TIME: 0.15 SEC
BH CV ECHO MEAS - PA PR(ACCEL): 13.6 MMHG
BH CV ECHO MEAS - PA V2 MAX: 89.6 CM/SEC
BH CV ECHO MEAS - PI END-D VEL: 95 CM/SEC
BH CV ECHO MEAS - PULM A REVS DUR: 0.18 SEC
BH CV ECHO MEAS - PULM A REVS VEL: 30.8 CM/SEC
BH CV ECHO MEAS - PULM DIAS VEL: 33.4 CM/SEC
BH CV ECHO MEAS - PULM S/D: 2
BH CV ECHO MEAS - PULM SYS VEL: 66.8 CM/SEC
BH CV ECHO MEAS - QP/QS: 1.17
BH CV ECHO MEAS - RAP SYSTOLE: 3 MMHG
BH CV ECHO MEAS - RV MAX PG: 2.02 MMHG
BH CV ECHO MEAS - RV V1 MAX: 71.1 CM/SEC
BH CV ECHO MEAS - RV V1 VTI: 16.2 CM
BH CV ECHO MEAS - RVOT DIAM: 3.2 CM
BH CV ECHO MEAS - RVSP: 42 MMHG
BH CV ECHO MEAS - SI(MOD-SP2): 15 ML/M2
BH CV ECHO MEAS - SI(MOD-SP4): 20.4 ML/M2
BH CV ECHO MEAS - SV(LVOT): 111.2 ML
BH CV ECHO MEAS - SV(MOD-SP2): 28 ML
BH CV ECHO MEAS - SV(MOD-SP4): 38 ML
BH CV ECHO MEAS - SV(RVOT): 130.1 ML
BH CV ECHO MEAS - TAPSE (>1.6): 2.3 CM
BH CV ECHO MEAS - TR MAX PG: 39 MMHG
BH CV ECHO MEAS - TR MAX VEL: 312.2 CM/SEC
BH CV ECHO MEASUREMENTS AVERAGE E/E' RATIO: 11.18
BH CV REST NUCLEAR ISOTOPE DOSE: 10.1 MCI
BH CV STRESS COMMENTS STAGE 1: NORMAL
BH CV STRESS DOSE REGADENOSON STAGE 1: 0.4
BH CV STRESS DURATION MIN STAGE 1: 0
BH CV STRESS DURATION SEC STAGE 1: 10
BH CV STRESS NUCLEAR ISOTOPE DOSE: 30.8 MCI
BH CV STRESS PROTOCOL 1: NORMAL
BH CV STRESS RECOVERY BP: NORMAL MMHG
BH CV STRESS RECOVERY HR: 69 BPM
BH CV STRESS STAGE 1: 1
BH CV XLRA - RV BASE: 4 CM
BH CV XLRA - RV LENGTH: 6.3 CM
BH CV XLRA - RV MID: 3.2 CM
BH CV XLRA - TDI S': 14.1 CM/SEC
LEFT ATRIUM VOLUME INDEX: 45.9 ML/M2
LV EF NUC BP: 83 %
MAXIMAL PREDICTED HEART RATE: 141 BPM
MAXIMAL PREDICTED HEART RATE: 141 BPM
PERCENT MAX PREDICTED HR: 58.87 %
SINUS: 2.8 CM
STJ: 2.8 CM
STRESS BASELINE BP: NORMAL MMHG
STRESS BASELINE HR: 60 BPM
STRESS PERCENT HR: 69 %
STRESS POST PEAK BP: NORMAL MMHG
STRESS POST PEAK HR: 83 BPM
STRESS TARGET HR: 120 BPM
STRESS TARGET HR: 120 BPM

## 2022-06-17 PROCEDURE — A9500 TC99M SESTAMIBI: HCPCS | Performed by: INTERNAL MEDICINE

## 2022-06-17 PROCEDURE — 93016 CV STRESS TEST SUPVJ ONLY: CPT | Performed by: INTERNAL MEDICINE

## 2022-06-17 PROCEDURE — 0 TECHNETIUM SESTAMIBI: Performed by: INTERNAL MEDICINE

## 2022-06-17 PROCEDURE — 93017 CV STRESS TEST TRACING ONLY: CPT

## 2022-06-17 PROCEDURE — 93018 CV STRESS TEST I&R ONLY: CPT | Performed by: INTERNAL MEDICINE

## 2022-06-17 PROCEDURE — 93306 TTE W/DOPPLER COMPLETE: CPT

## 2022-06-17 PROCEDURE — 93306 TTE W/DOPPLER COMPLETE: CPT | Performed by: INTERNAL MEDICINE

## 2022-06-17 PROCEDURE — 25010000002 REGADENOSON 0.4 MG/5ML SOLUTION: Performed by: INTERNAL MEDICINE

## 2022-06-17 PROCEDURE — 78452 HT MUSCLE IMAGE SPECT MULT: CPT | Performed by: INTERNAL MEDICINE

## 2022-06-17 PROCEDURE — 78452 HT MUSCLE IMAGE SPECT MULT: CPT

## 2022-06-17 RX ADMIN — REGADENOSON 0.4 MG: 0.08 INJECTION, SOLUTION INTRAVENOUS at 09:10

## 2022-06-17 RX ADMIN — TECHNETIUM TC 99M SESTAMIBI 1 DOSE: 1 INJECTION INTRAVENOUS at 08:25

## 2022-06-17 RX ADMIN — TECHNETIUM TC 99M SESTAMIBI 1 DOSE: 1 INJECTION INTRAVENOUS at 09:10

## 2022-07-11 ENCOUNTER — HOSPITAL ENCOUNTER (OUTPATIENT)
Dept: CT IMAGING | Facility: HOSPITAL | Age: 77
Discharge: HOME | End: 2022-07-11
Admitting: INTERNAL MEDICINE

## 2022-07-11 DIAGNOSIS — I67.1 CEREBRAL ANEURYSM, NONRUPTURED: Chronic | ICD-10-CM

## 2022-07-11 LAB — CREAT BLDA-MCNC: 1.1 MG/DL (ref 0.6–1.3)

## 2022-07-11 PROCEDURE — 25010000002 IOPAMIDOL 61 % SOLUTION: Performed by: INTERNAL MEDICINE

## 2022-07-11 PROCEDURE — 82565 ASSAY OF CREATININE: CPT

## 2022-07-11 PROCEDURE — 70496 CT ANGIOGRAPHY HEAD: CPT

## 2022-07-11 RX ADMIN — IOPAMIDOL 95 ML: 612 INJECTION, SOLUTION INTRAVENOUS at 08:33

## 2022-07-15 DIAGNOSIS — I67.1 CEREBRAL ANEURYSM, NONRUPTURED: Primary | ICD-10-CM

## 2022-07-25 ENCOUNTER — TELEPHONE (OUTPATIENT)
Dept: NEUROSURGERY | Facility: CLINIC | Age: 77
End: 2022-07-25

## 2022-07-25 NOTE — TELEPHONE ENCOUNTER
Dr. Snyder does not treat for DX. Patient jeronimo need to see Dr. Carmona or Dr. Harrison. Please see referral for further communication.

## 2022-07-25 NOTE — TELEPHONE ENCOUNTER
Ms. Morales called to check on her referral to Dr. Snyder from Dr. Lerner,. Do you mind checking into this please

## 2022-08-09 DIAGNOSIS — J30.1 CHRONIC ALLERGIC RHINITIS DUE TO POLLEN: ICD-10-CM

## 2022-08-09 RX ORDER — AZELASTINE HCL 205.5 UG/1
2 SPRAY NASAL 2 TIMES DAILY
Qty: 120 ML | Refills: 1 | Status: SHIPPED | OUTPATIENT
Start: 2022-08-09 | End: 2023-02-10 | Stop reason: SDUPTHER

## 2022-08-09 NOTE — TELEPHONE ENCOUNTER
Caller: Meme Morales    Relationship: Self    Best call back number: [phone]     Requested Prescriptions:   Requested Prescriptions     Pending Prescriptions Disp Refills   • azelastine (ASTEPRO) 0.15 % solution nasal spray 120 mL 1     Si sprays 2 (Two) Times a Day. as directed        Pharmacy where request should be sent: [facility]) - [facility] [address] [phone] [facility] [phone] [facility]     Additional details provided by patient: PATIENT IS COMPLETELY OUT    Does the patient have less than a 3 day supply:  [x] Yes  [] No    Matty Gonzales Rep   22 14:30 EDT

## 2022-08-19 PROCEDURE — 93296 REM INTERROG EVL PM/IDS: CPT | Performed by: INTERNAL MEDICINE

## 2022-08-19 PROCEDURE — 93294 REM INTERROG EVL PM/LDLS PM: CPT | Performed by: INTERNAL MEDICINE

## 2022-09-06 NOTE — PROGRESS NOTES
Subjective   History of Present Illness: Meme Morales is a 77 y.o. female is being seen for consultation today at the request of Xochilt Conner MD for cerebral aneurysm. CTA completed 7/11/22. She is a previous patient of Dr. Key.  She is doing well today.  No new complaints.  Denies any changes in the frequency or severity of her headaches.  Denies any changes in vision.  Denies any strokelike episodes.  Denies any changes in strength or sensation.  She reports over the year she has had a pressure incision on the right side of her head      History of Present Illness    The following portions of the patient's history were reviewed and updated as appropriate: allergies, past family history, past medical history, past social history, past surgical history and problem list.    Past Medical History:   Diagnosis Date   • Acid reflux    • Arthritis    • Arthritis of carpometacarpal (CMC) joint of right thumb    • Francisco esophagus    • Basal cell carcinoma    • Brain aneurysm    • Burning with urination    • Cancer (HCC)     non malignant skin cancers   • Cerebral aneurysm    • Congestive heart failure (HCC)    • Depression    • Difficulty swallowing    • Disease of thyroid gland     HISTORY OF NODULES, SURGERY.    • Encounter for Medicare annual wellness exam    • Esophageal reflux    • Exposure to chickenpox    • H/O TB skin testing     WITH POSITIVE RESULT. NEG CXR.   • Heart murmur    • High blood pressure    • History of hyperparathyroidism    • History of migraine headaches    • Hoarseness    • Hypercalcemia    • Hyperlipidemia    • Hypertension    • Hypothyroidism    • Leaky heart valve    • Low back pain    • Osteopenia with high risk of fracture    • Overweight with body mass index (BMI) of 25 to 25.9 in adult    • Pacemaker     IMPLANTED LEFT SIDE   • Palpitations    • Pseudocholinesterase deficiency     AUNT ON FATHER'S SIDE OF FAMILY   • Pulmonary hypertension (HCC)    • Rheumatic multiple valve  disease    • RLS (restless legs syndrome)    • Rubella    • Seasonal allergies    • Sick sinus syndrome due to SA node dysfunction (HCC)     REASON FOR PACEMAKER   • Skin disorder    • Slow to wake up after anesthesia    • Stage 3 chronic kidney disease (HCC)    • Symptoms of urinary tract infection    • Thoracic outlet syndrome    • Thyroid disease    • Tinea cruris    • Tricuspid valve regurgitation    • Urinary hesitancy    • Valvular heart disease    • Vitamin D deficiency         Past Surgical History:   Procedure Laterality Date   • CARDIAC CATHETERIZATION      Post-Proc Data:  Lesions successfully dilated    • CATARACT EXTRACTION WITH INTRAOCULAR LENS IMPLANT      LEFT AND RIGHT   • CEREBRAL ANGIOGRAM N/A 6/26/2019    Procedure: CEREBRAL ANGIOGRAM;  Surgeon: Ranjit Key MD;  Location: [facility] OR 18/19;  Service: Neurosurgery   • COLONOSCOPY     • CYST REMOVAL Right     REMOVED UNDER RIGHT ARM. BENIGN   • EMBOLIZATION CEREBRAL N/A 11/26/2018    Procedure: Cerebral angiography and embolization of cerebral aneurysm with possible Neuroform stent;  Surgeon: Ranjit Key MD;  Location: [facility] OR 18/19;  Service: Neurosurgery   • HYSTERECTOMY     • PACEMAKER IMPLANTATION Left     [employer] [facility]   • PARATHYROID GLAND SURGERY      ONE GLAND RIGHT   • THYROIDECTOMY, PARTIAL Right    • TONSILLECTOMY AND ADENOIDECTOMY     • UPPER GASTROINTESTINAL ENDOSCOPY            Current Outpatient Medications:   •  Acetaminophen 500 MG capsule, Daily., Disp: , Rfl:   •  ascorbic acid (VITAMIN C) 1000 MG tablet, Daily., Disp: , Rfl:   •  aspirin  MG tablet, Take 1 tablet by mouth Daily., Disp: 30 tablet, Rfl: 6  •  atorvastatin (LIPITOR) 40 MG tablet, TAKE 1 TABLET BY MOUTH  DAILY, Disp: 90 tablet, Rfl: 3  •  azelastine (ASTEPRO) 0.15 % solution nasal spray, 2 sprays into the nostril(s) as directed by provider 2 (Two) Times a Day. as directed, Disp: 120 mL, Rfl: 1  •  Cholecalciferol (VITAMIN D3)  2000 units tablet, Take 2,000 Units by mouth Daily., Disp: , Rfl:   •  flunisolide (NASALIDE) 25 MCG/ACT (0.025%) solution nasal spray, USE 2 SPRAYS NASALLY EVERY 12 HOURS, Disp: 100 mL, Rfl: 1  •  furosemide (Lasix) 40 MG tablet, Take 1 tablet by mouth Daily., Disp: 90 tablet, Rfl: 1  •  levothyroxine (SYNTHROID, LEVOTHROID) 75 MCG tablet, TAKE 1 TABLET BY MOUTH  DAILY, Disp: 90 tablet, Rfl: 3  •  lisinopril-hydrochlorothiazide (PRINZIDE,ZESTORETIC) 10-12.5 MG per tablet, , Disp: , Rfl:   •  montelukast (SINGULAIR) 10 MG tablet, TAKE 1 TABLET BY MOUTH  DAILY, Disp: 90 tablet, Rfl: 3  •  pantoprazole (PROTONIX) 40 MG EC tablet, TAKE 1 TABLET BY MOUTH  DAILY, Disp: 90 tablet, Rfl: 3  •  polycarbophil (calcium polycarbophil) 625 MG tablet tablet, Every 6 (Six) Hours., Disp: , Rfl:   •  rOPINIRole (REQUIP) 1 MG tablet, Take 1 tablet by mouth 3 (Three) Times a Day., Disp: 270 tablet, Rfl: 1     Allergies   Allergen Reactions   • Codeine Other (See Comments)     HYPOTENTION   • Morphine And Related Hallucinations        Social History     Socioeconomic History   • Marital status:    Tobacco Use   • Smoking status: Former Smoker     Packs/day: 1.50     Years: 25.00     Pack years: 37.50     Types: Cigarettes     Quit date:      Years since quittin.7   • Smokeless tobacco: Never Used   Substance and Sexual Activity   • Alcohol use: Yes     Comment: occasional   • Drug use: No   • Sexual activity: Defer        Family History   Problem Relation Age of Onset   • Heart disease Father    • Stroke Father    • Heart attack Father    • Hypertension Father    • Stroke Maternal Grandmother    • Hypertension Mother    • Hypertension Sister    • Malig Hyperthermia Neg Hx         Review of Systems   HENT: Negative for tinnitus.    Eyes: Positive for visual disturbance (blurry vision both eyes ).   Gastrointestinal: Negative for constipation and diarrhea.   Genitourinary: Negative for difficulty urinating and frequency.    Neurological: Positive for dizziness and light-headedness. Negative for speech difficulty, weakness, numbness and headaches.       Objective     Vitals:    22 1416   BP: 140/70   Pulse: 60   Temp: 98.2 °F (36.8 °C)   SpO2: 98%   Weight: 81 kg (178 lb 9.6 oz)     Body mass index is 27.97 kg/m².      Physical Exam  Neurologic Exam  Awake, alert, oriented x3  Pupils equal round reactive to light  Extraocular muscles intact  Face symmetric  Speech is fluent and clear  No pronator drift  Motor exam  Bilateral deltoids 5/5, bilateral biceps 5/5, bilateral triceps 5/5, bilateral wrist extension 5/5 bilateral hand  5/5  Bilateral hip flexion 5/5, bilateral knee extension 5/5, bilateral DF/PF 5/5  No clonus  No Hattie's reflex  Able to detect  light touch in all 4 extremities      Assessment & Plan   Independent Review of Radiographic Studies:      I personally reviewed the images from the following studies.  CTA head from 2022  The CTA images show expected changes from the stent assisted coil embolization of the left sided ophthalmic artery aneurysm.  There is no evidence of residual or recurrent aneurysm    Medical Decision Makin-year-old female s/p stent assisted coil embolization of a left sided ophthalmic segment aneurysm in 2018  -She is doing well today.  She reports she has had a sensation of fracture on the right side of her head for the past year, but otherwise has no new complaints.  No changes in the frequency or severity of her headaches.  No new neurologic deficits.  -I reviewed the CTA findings and there is expected changes from the stent assisted coil embolization.  There is no obvious evidence of residual or recurrent aneurysm.   -I believe an MRA would be very helpful to better visualize site aneurysm.  We also discussed the risks and benefits of a diagnostic cerebral angiogram.  Her aneurysm has never ruptured and was incidentally found.  I will plan to have her come back in  1 year with a follow-up MRA.  If she develops any new symptoms I have asked her to come back earlier.  If there is any question of residual or recurrent aneurysm at that time we will consider a diagnostic angiogram.     Diagnoses and all orders for this visit:    1. Cerebral aneurysm, nonruptured (Primary)  -     MRI Angiogram Head Without Contrast; Future      Return in about 1 year (around 9/7/2023).    I spent 35 minutes reviewing the medical record, reviewing the CTA images, discussing the management of intracranial aneurysms

## 2022-09-07 ENCOUNTER — OFFICE VISIT (OUTPATIENT)
Dept: NEUROSURGERY | Facility: CLINIC | Age: 77
End: 2022-09-07

## 2022-09-07 VITALS
DIASTOLIC BLOOD PRESSURE: 70 MMHG | OXYGEN SATURATION: 98 % | BODY MASS INDEX: 27.97 KG/M2 | SYSTOLIC BLOOD PRESSURE: 140 MMHG | HEART RATE: 60 BPM | WEIGHT: 178.6 LBS | TEMPERATURE: 98.2 F

## 2022-09-07 DIAGNOSIS — I67.1 CEREBRAL ANEURYSM, NONRUPTURED: Primary | ICD-10-CM

## 2022-09-07 PROCEDURE — 99204 OFFICE O/P NEW MOD 45 MIN: CPT | Performed by: NEUROLOGICAL SURGERY

## 2022-09-09 ENCOUNTER — PATIENT ROUNDING (BHMG ONLY) (OUTPATIENT)
Dept: NEUROSURGERY | Facility: CLINIC | Age: 77
End: 2022-09-09

## 2022-11-13 ENCOUNTER — HOSPITAL ENCOUNTER (OUTPATIENT)
Facility: HOSPITAL | Age: 77
Setting detail: OBSERVATION
Discharge: HOME | End: 2022-11-15
Attending: EMERGENCY MEDICINE | Admitting: INTERNAL MEDICINE

## 2022-11-13 ENCOUNTER — APPOINTMENT (OUTPATIENT)
Dept: CT IMAGING | Facility: HOSPITAL | Age: 77
End: 2022-11-13

## 2022-11-13 ENCOUNTER — APPOINTMENT (OUTPATIENT)
Dept: GENERAL RADIOLOGY | Facility: HOSPITAL | Age: 77
End: 2022-11-13

## 2022-11-13 DIAGNOSIS — K52.9 COLITIS: ICD-10-CM

## 2022-11-13 DIAGNOSIS — R10.9 ABDOMINAL PAIN, UNSPECIFIED ABDOMINAL LOCATION: Primary | ICD-10-CM

## 2022-11-13 DIAGNOSIS — N28.9 ACUTE RENAL INSUFFICIENCY: ICD-10-CM

## 2022-11-13 DIAGNOSIS — I95.9 HYPOTENSION, UNSPECIFIED HYPOTENSION TYPE: ICD-10-CM

## 2022-11-13 DIAGNOSIS — R07.9 CHEST PAIN, UNSPECIFIED TYPE: ICD-10-CM

## 2022-11-13 DIAGNOSIS — D72.829 LEUKOCYTOSIS, UNSPECIFIED TYPE: ICD-10-CM

## 2022-11-13 PROBLEM — N17.9 AKI (ACUTE KIDNEY INJURY): Status: ACTIVE | Noted: 2022-11-13

## 2022-11-13 PROBLEM — N39.0 UTI (URINARY TRACT INFECTION): Status: ACTIVE | Noted: 2022-11-13

## 2022-11-13 LAB
ADV 40+41 DNA STL QL NAA+NON-PROBE: NOT DETECTED
ALBUMIN SERPL-MCNC: 5 G/DL (ref 3.5–5.2)
ALBUMIN/GLOB SERPL: 1.4 G/DL
ALP SERPL-CCNC: 143 U/L (ref 39–117)
ALT SERPL W P-5'-P-CCNC: 18 U/L (ref 1–33)
ANION GAP SERPL CALCULATED.3IONS-SCNC: 16.2 MMOL/L (ref 5–15)
AST SERPL-CCNC: 30 U/L (ref 1–32)
ASTRO TYP 1-8 RNA STL QL NAA+NON-PROBE: NOT DETECTED
BACTERIA UR QL AUTO: ABNORMAL /HPF
BASOPHILS # BLD AUTO: 0.11 10*3/MM3 (ref 0–0.2)
BASOPHILS NFR BLD AUTO: 0.6 % (ref 0–1.5)
BILIRUB SERPL-MCNC: 0.6 MG/DL (ref 0–1.2)
BILIRUB UR QL STRIP: NEGATIVE
BUN SERPL-MCNC: 33 MG/DL (ref 8–23)
BUN/CREAT SERPL: 16.2 (ref 7–25)
C CAYETANENSIS DNA STL QL NAA+NON-PROBE: NOT DETECTED
C COLI+JEJ+UPSA DNA STL QL NAA+NON-PROBE: NOT DETECTED
C DIFF TOX GENS STL QL NAA+PROBE: NEGATIVE
CALCIUM SPEC-SCNC: 11.3 MG/DL (ref 8.6–10.5)
CHLORIDE SERPL-SCNC: 99 MMOL/L (ref 98–107)
CLARITY UR: ABNORMAL
CO2 SERPL-SCNC: 20.8 MMOL/L (ref 22–29)
COLOR UR: ABNORMAL
CREAT SERPL-MCNC: 2.04 MG/DL (ref 0.57–1)
CRYPTOSP DNA STL QL NAA+NON-PROBE: NOT DETECTED
D-LACTATE SERPL-SCNC: 3 MMOL/L (ref 0.5–2)
DEPRECATED RDW RBC AUTO: 42.8 FL (ref 37–54)
E HISTOLYT DNA STL QL NAA+NON-PROBE: NOT DETECTED
EAEC PAA PLAS AGGR+AATA ST NAA+NON-PRB: NOT DETECTED
EC STX1+STX2 GENES STL QL NAA+NON-PROBE: NOT DETECTED
EGFRCR SERPLBLD CKD-EPI 2021: 24.4 ML/MIN/1.73
EOSINOPHIL # BLD AUTO: 0.39 10*3/MM3 (ref 0–0.4)
EOSINOPHIL NFR BLD AUTO: 2.1 % (ref 0.3–6.2)
EPEC EAE GENE STL QL NAA+NON-PROBE: NOT DETECTED
ERYTHROCYTE [DISTWIDTH] IN BLOOD BY AUTOMATED COUNT: 12.8 % (ref 12.3–15.4)
ETEC LTA+ST1A+ST1B TOX ST NAA+NON-PROBE: NOT DETECTED
G LAMBLIA DNA STL QL NAA+NON-PROBE: NOT DETECTED
GLOBULIN UR ELPH-MCNC: 3.6 GM/DL
GLUCOSE SERPL-MCNC: 153 MG/DL (ref 65–99)
GLUCOSE UR STRIP-MCNC: NEGATIVE MG/DL
HCT VFR BLD AUTO: 43.2 % (ref 34–46.6)
HGB BLD-MCNC: 14.6 G/DL (ref 12–15.9)
HGB UR QL STRIP.AUTO: NEGATIVE
HOLD SPECIMEN: NORMAL
HOLD SPECIMEN: NORMAL
HYALINE CASTS UR QL AUTO: ABNORMAL /LPF
IMM GRANULOCYTES # BLD AUTO: 0.08 10*3/MM3 (ref 0–0.05)
IMM GRANULOCYTES NFR BLD AUTO: 0.4 % (ref 0–0.5)
KETONES UR QL STRIP: ABNORMAL
LEUKOCYTE ESTERASE UR QL STRIP.AUTO: ABNORMAL
LIPASE SERPL-CCNC: 68 U/L (ref 13–60)
LYMPHOCYTES # BLD AUTO: 1.3 10*3/MM3 (ref 0.7–3.1)
LYMPHOCYTES NFR BLD AUTO: 6.9 % (ref 19.6–45.3)
MCH RBC QN AUTO: 30.5 PG (ref 26.6–33)
MCHC RBC AUTO-ENTMCNC: 33.8 G/DL (ref 31.5–35.7)
MCV RBC AUTO: 90.2 FL (ref 79–97)
MONOCYTES # BLD AUTO: 0.79 10*3/MM3 (ref 0.1–0.9)
MONOCYTES NFR BLD AUTO: 4.2 % (ref 5–12)
NEUTROPHILS NFR BLD AUTO: 16.18 10*3/MM3 (ref 1.7–7)
NEUTROPHILS NFR BLD AUTO: 85.8 % (ref 42.7–76)
NITRITE UR QL STRIP: NEGATIVE
NOROVIRUS GI+II RNA STL QL NAA+NON-PROBE: NOT DETECTED
NRBC BLD AUTO-RTO: 0 /100 WBC (ref 0–0.2)
NT-PROBNP SERPL-MCNC: 1708 PG/ML (ref 0–1800)
P SHIGELLOIDES DNA STL QL NAA+NON-PROBE: NOT DETECTED
PH UR STRIP.AUTO: <=5 [PH] (ref 5–8)
PLATELET # BLD AUTO: 294 10*3/MM3 (ref 140–450)
PMV BLD AUTO: 10.9 FL (ref 6–12)
POTASSIUM SERPL-SCNC: 5.2 MMOL/L (ref 3.5–5.2)
PROT SERPL-MCNC: 8.6 G/DL (ref 6–8.5)
PROT UR QL STRIP: ABNORMAL
QT INTERVAL: 425 MS
RBC # BLD AUTO: 4.79 10*6/MM3 (ref 3.77–5.28)
RBC # UR STRIP: ABNORMAL /HPF
REF LAB TEST METHOD: ABNORMAL
RVA RNA STL QL NAA+NON-PROBE: NOT DETECTED
S ENT+BONG DNA STL QL NAA+NON-PROBE: NOT DETECTED
SAPO I+II+IV+V RNA STL QL NAA+NON-PROBE: NOT DETECTED
SHIGELLA SP+EIEC IPAH ST NAA+NON-PROBE: NOT DETECTED
SODIUM SERPL-SCNC: 136 MMOL/L (ref 136–145)
SP GR UR STRIP: 1.02 (ref 1–1.03)
SQUAMOUS #/AREA URNS HPF: ABNORMAL /HPF
TROPONIN T SERPL-MCNC: <0.01 NG/ML (ref 0–0.03)
UROBILINOGEN UR QL STRIP: ABNORMAL
V CHOL+PARA+VUL DNA STL QL NAA+NON-PROBE: NOT DETECTED
V CHOLERAE DNA STL QL NAA+NON-PROBE: NOT DETECTED
WBC # UR STRIP: ABNORMAL /HPF
WBC NRBC COR # BLD: 18.85 10*3/MM3 (ref 3.4–10.8)
WHOLE BLOOD HOLD COAG: NORMAL
WHOLE BLOOD HOLD SPECIMEN: NORMAL
Y ENTEROCOL DNA STL QL NAA+NON-PROBE: NOT DETECTED

## 2022-11-13 PROCEDURE — 80053 COMPREHEN METABOLIC PANEL: CPT | Performed by: EMERGENCY MEDICINE

## 2022-11-13 PROCEDURE — P9612 CATHETERIZE FOR URINE SPEC: HCPCS

## 2022-11-13 PROCEDURE — 71045 X-RAY EXAM CHEST 1 VIEW: CPT

## 2022-11-13 PROCEDURE — 81001 URINALYSIS AUTO W/SCOPE: CPT | Performed by: EMERGENCY MEDICINE

## 2022-11-13 PROCEDURE — G0378 HOSPITAL OBSERVATION PER HR: HCPCS

## 2022-11-13 PROCEDURE — 83690 ASSAY OF LIPASE: CPT | Performed by: EMERGENCY MEDICINE

## 2022-11-13 PROCEDURE — 93010 ELECTROCARDIOGRAM REPORT: CPT | Performed by: INTERNAL MEDICINE

## 2022-11-13 PROCEDURE — 87493 C DIFF AMPLIFIED PROBE: CPT | Performed by: EMERGENCY MEDICINE

## 2022-11-13 PROCEDURE — 74176 CT ABD & PELVIS W/O CONTRAST: CPT

## 2022-11-13 PROCEDURE — 87040 BLOOD CULTURE FOR BACTERIA: CPT | Performed by: EMERGENCY MEDICINE

## 2022-11-13 PROCEDURE — 87507 IADNA-DNA/RNA PROBE TQ 12-25: CPT | Performed by: EMERGENCY MEDICINE

## 2022-11-13 PROCEDURE — 83605 ASSAY OF LACTIC ACID: CPT | Performed by: EMERGENCY MEDICINE

## 2022-11-13 PROCEDURE — 36415 COLL VENOUS BLD VENIPUNCTURE: CPT

## 2022-11-13 PROCEDURE — 25010000002 PIPERACILLIN SOD-TAZOBACTAM PER 1 G: Performed by: EMERGENCY MEDICINE

## 2022-11-13 PROCEDURE — 51798 US URINE CAPACITY MEASURE: CPT

## 2022-11-13 PROCEDURE — 96365 THER/PROPH/DIAG IV INF INIT: CPT

## 2022-11-13 PROCEDURE — 99285 EMERGENCY DEPT VISIT HI MDM: CPT

## 2022-11-13 PROCEDURE — 93005 ELECTROCARDIOGRAM TRACING: CPT | Performed by: EMERGENCY MEDICINE

## 2022-11-13 PROCEDURE — 96361 HYDRATE IV INFUSION ADD-ON: CPT

## 2022-11-13 PROCEDURE — 85025 COMPLETE CBC W/AUTO DIFF WBC: CPT | Performed by: EMERGENCY MEDICINE

## 2022-11-13 PROCEDURE — 84484 ASSAY OF TROPONIN QUANT: CPT | Performed by: EMERGENCY MEDICINE

## 2022-11-13 PROCEDURE — 83880 ASSAY OF NATRIURETIC PEPTIDE: CPT | Performed by: EMERGENCY MEDICINE

## 2022-11-13 RX ORDER — ASCORBIC ACID 500 MG
1000 TABLET ORAL NIGHTLY
Status: DISCONTINUED | OUTPATIENT
Start: 2022-11-13 | End: 2022-11-15 | Stop reason: HOSPADM

## 2022-11-13 RX ORDER — SODIUM CHLORIDE 0.9 % (FLUSH) 0.9 %
10 SYRINGE (ML) INJECTION EVERY 12 HOURS SCHEDULED
Status: DISCONTINUED | OUTPATIENT
Start: 2022-11-13 | End: 2022-11-15 | Stop reason: HOSPADM

## 2022-11-13 RX ORDER — LEVOTHYROXINE SODIUM 0.07 MG/1
75 TABLET ORAL
Status: DISCONTINUED | OUTPATIENT
Start: 2022-11-14 | End: 2022-11-15 | Stop reason: HOSPADM

## 2022-11-13 RX ORDER — SODIUM CHLORIDE 0.9 % (FLUSH) 0.9 %
10 SYRINGE (ML) INJECTION AS NEEDED
Status: DISCONTINUED | OUTPATIENT
Start: 2022-11-13 | End: 2022-11-15 | Stop reason: HOSPADM

## 2022-11-13 RX ORDER — ATORVASTATIN CALCIUM 20 MG/1
40 TABLET, FILM COATED ORAL DAILY
Status: DISCONTINUED | OUTPATIENT
Start: 2022-11-14 | End: 2022-11-15 | Stop reason: HOSPADM

## 2022-11-13 RX ORDER — ROPINIROLE 1 MG/1
1 TABLET, FILM COATED ORAL 3 TIMES DAILY
Status: DISCONTINUED | OUTPATIENT
Start: 2022-11-13 | End: 2022-11-13

## 2022-11-13 RX ORDER — MELATONIN
2000 DAILY
Status: DISCONTINUED | OUTPATIENT
Start: 2022-11-14 | End: 2022-11-15 | Stop reason: HOSPADM

## 2022-11-13 RX ORDER — ASPIRIN 325 MG
325 TABLET, DELAYED RELEASE (ENTERIC COATED) ORAL DAILY
Status: DISCONTINUED | OUTPATIENT
Start: 2022-11-14 | End: 2022-11-15 | Stop reason: HOSPADM

## 2022-11-13 RX ORDER — MONTELUKAST SODIUM 10 MG/1
10 TABLET ORAL NIGHTLY
Status: DISCONTINUED | OUTPATIENT
Start: 2022-11-13 | End: 2022-11-15 | Stop reason: HOSPADM

## 2022-11-13 RX ORDER — NITROGLYCERIN 0.4 MG/1
0.4 TABLET SUBLINGUAL
Status: DISCONTINUED | OUTPATIENT
Start: 2022-11-13 | End: 2022-11-15 | Stop reason: HOSPADM

## 2022-11-13 RX ORDER — ONDANSETRON 2 MG/ML
4 INJECTION INTRAMUSCULAR; INTRAVENOUS EVERY 6 HOURS PRN
Status: DISCONTINUED | OUTPATIENT
Start: 2022-11-13 | End: 2022-11-15 | Stop reason: HOSPADM

## 2022-11-13 RX ORDER — ROPINIROLE 1 MG/1
1 TABLET, FILM COATED ORAL 3 TIMES DAILY
Status: DISCONTINUED | OUTPATIENT
Start: 2022-11-13 | End: 2022-11-15 | Stop reason: HOSPADM

## 2022-11-13 RX ORDER — ROPINIROLE 1 MG/1
1 TABLET, FILM COATED ORAL ONCE
Status: COMPLETED | OUTPATIENT
Start: 2022-11-13 | End: 2022-11-13

## 2022-11-13 RX ORDER — SODIUM CHLORIDE 9 MG/ML
100 INJECTION, SOLUTION INTRAVENOUS CONTINUOUS
Status: DISCONTINUED | OUTPATIENT
Start: 2022-11-13 | End: 2022-11-15 | Stop reason: HOSPADM

## 2022-11-13 RX ADMIN — SODIUM CHLORIDE 100 ML/HR: 9 INJECTION, SOLUTION INTRAVENOUS at 23:15

## 2022-11-13 RX ADMIN — SODIUM CHLORIDE, POTASSIUM CHLORIDE, SODIUM LACTATE AND CALCIUM CHLORIDE 500 ML: 600; 310; 30; 20 INJECTION, SOLUTION INTRAVENOUS at 15:33

## 2022-11-13 RX ADMIN — TAZOBACTAM SODIUM AND PIPERACILLIN SODIUM 3.38 G: 375; 3 INJECTION, SOLUTION INTRAVENOUS at 18:54

## 2022-11-13 RX ADMIN — ROPINIROLE 1 MG: 1 TABLET, FILM COATED ORAL at 23:14

## 2022-11-13 RX ADMIN — ROPINIROLE 1 MG: 1 TABLET, FILM COATED ORAL at 16:41

## 2022-11-13 RX ADMIN — MONTELUKAST SODIUM 10 MG: 10 TABLET, FILM COATED ORAL at 23:13

## 2022-11-13 RX ADMIN — SODIUM CHLORIDE, POTASSIUM CHLORIDE, SODIUM LACTATE AND CALCIUM CHLORIDE 500 ML: 600; 310; 30; 20 INJECTION, SOLUTION INTRAVENOUS at 18:12

## 2022-11-13 RX ADMIN — SODIUM CHLORIDE, POTASSIUM CHLORIDE, SODIUM LACTATE AND CALCIUM CHLORIDE 500 ML: 600; 310; 30; 20 INJECTION, SOLUTION INTRAVENOUS at 16:40

## 2022-11-13 NOTE — ED NOTES
Pt presents to ED via EMS from home. Pt reports generalized weakness and fatigue x3-5 days with diarrhea. Pt was hypotensive with ambulation. Pt is A&OX4, able to stand and sit, and in a mask at this time.     Patient was placed in face mask during first look triage.  Patient was wearing a face mask throughout encounter.  I wore personal protective equipment throughout the encounter.  Hand hygiene was performed before and after patient encounter.

## 2022-11-13 NOTE — ED NOTES
Nursing report ED to floor  Meme Morales  77 y.o.  female    HPI :   Chief Complaint   Patient presents with    Abdominal Pain    Diarrhea    Hypotension       Admitting doctor:   Ayaz Jaimes MD    Admitting diagnosis:   The primary encounter diagnosis was Abdominal pain, unspecified abdominal location. Diagnoses of Acute renal insufficiency, Hypotension, unspecified hypotension type, Leukocytosis, unspecified type, Chest pain, unspecified type, and Colitis were also pertinent to this visit.    Code status:   Current Code Status       Date Active Code Status Order ID Comments User Context       Prior            Allergies:   Codeine and Morphine and related    Isolation:   Contact Spore    Intake and Output    Intake/Output Summary (Last 24 hours) at 11/13/2022 1818  Last data filed at 11/13/2022 1815  Gross per 24 hour   Intake 1000 ml   Output --   Net 1000 ml       Weight:   There were no vitals filed for this visit.    Most recent vitals:   Vitals:    11/13/22 1528 11/13/22 1534 11/13/22 1601 11/13/22 1631   BP: 130/56  102/85 137/70   BP Location:       Patient Position:       Pulse: 60 70 80 60   Resp:       Temp:       TempSrc:       SpO2: 100% 100% 100% 99%       Active LDAs/IV Access:   Lines, Drains & Airways       Active LDAs       Name Placement date Placement time Site Days    Peripheral IV 11/13/22 1526 Left Antecubital 11/13/22  1526  Antecubital  less than 1                    Labs (abnormal labs have a star):   Labs Reviewed   COMPREHENSIVE METABOLIC PANEL - Abnormal; Notable for the following components:       Result Value    Glucose 153 (*)     BUN 33 (*)     Creatinine 2.04 (*)     CO2 20.8 (*)     Calcium 11.3 (*)     Total Protein 8.6 (*)     Alkaline Phosphatase 143 (*)     Anion Gap 16.2 (*)     eGFR 24.4 (*)     All other components within normal limits    Narrative:     GFR Normal >60  Chronic Kidney Disease <60  Kidney Failure <15    The GFR formula is only valid for adults with  stable renal function between ages 18 and 70.   LIPASE - Abnormal; Notable for the following components:    Lipase 68 (*)     All other components within normal limits   URINALYSIS W/ MICROSCOPIC IF INDICATED (NO CULTURE) - Abnormal; Notable for the following components:    Color, UA Dark Yellow (*)     Appearance, UA Cloudy (*)     Ketones, UA 15 mg/dL (1+) (*)     Protein,  mg/dL (2+) (*)     Leuk Esterase, UA Small (1+) (*)     All other components within normal limits   CBC WITH AUTO DIFFERENTIAL - Abnormal; Notable for the following components:    WBC 18.85 (*)     Neutrophil % 85.8 (*)     Lymphocyte % 6.9 (*)     Monocyte % 4.2 (*)     Neutrophils, Absolute 16.18 (*)     Immature Grans, Absolute 0.08 (*)     All other components within normal limits   URINALYSIS, MICROSCOPIC ONLY - Abnormal; Notable for the following components:    RBC, UA 3-5 (*)     WBC, UA 6-12 (*)     Squamous Epithelial Cells, UA 3-6 (*)     All other components within normal limits   GASTROINTESTINAL PANEL, PCR - Normal    Narrative:     If Aeromonas, Staphylococcus aureus or Bacillus cereus are suspected, please order [id number]: Stool Culture, Aeromonas, S aureus, B Cereus.   CLOSTRIDIOIDES DIFFICILE TOXIN, PCR - Normal    Narrative:     The result indicates the absence of toxigenic C. difficile from stool specimen.    TROPONIN (IN-HOUSE) - Normal    Narrative:     Troponin T Reference Range:  <= 0.03 ng/mL-   Negative for AMI  >0.03 ng/mL-     Abnormal for myocardial necrosis.  Clinicians would have to utilize clinical acumen, EKG, Troponin and serial changes to determine if it is an Acute Myocardial Infarction or myocardial injury due to an underlying chronic condition.       Results may be falsely decreased if patient taking Biotin.     BNP (IN-HOUSE) - Normal    Narrative:     Among patients with dyspnea, NT-proBNP is highly sensitive for the detection of acute congestive heart failure. In addition NT-proBNP of <300 pg/ml  effectively rules out acute congestive heart failure with 99% negative predictive value.    Results may be falsely decreased if patient taking Biotin.     CLOSTRIDIOIDES DIFFICILE TOXIN    Narrative:     The following orders were created for panel order Clostridioides difficile Toxin - Stool, Per Rectum.  Procedure                               Abnormality         Status                     ---------                               -----------         ------                     Clostridioides difficile...[[id number]]  Normal              Final result                 Please view results for these tests on the individual orders.   BLOOD CULTURE   BLOOD CULTURE   RAINBOW DRAW    Narrative:     The following orders were created for panel order [place] Draw.  Procedure                               Abnormality         Status                     ---------                               -----------         ------                     Green Top (Gel)[[id number]]                                  Final result               Lavender Top[[id number]]                                     Final result               Gold Top - SST[[id number]]                                   Final result               Light Blue Top[[id number]]                                   Final result                 Please view results for these tests on the individual orders.   LACTIC ACID, PLASMA   CBC AND DIFFERENTIAL    Narrative:     The following orders were created for panel order CBC & Differential.  Procedure                               Abnormality         Status                     ---------                               -----------         ------                     CBC Auto Differential[[id number]]        Abnormal            Final result                 Please view results for these tests on the individual orders.   GREEN TOP   LAVENDER TOP   GOLD TOP - SST   LIGHT BLUE TOP       EKG:   ECG 12 Lead Chest Pain   Preliminary Result   HEART RATE= 65  bpm    RR Interval= 923  ms   CO Interval= 166  ms   P Horizontal Axis= 30  deg   P Front Axis= 53  deg   QRSD Interval= 110  ms   QT Interval= 425  ms   QRS Axis= 258  deg   T Wave Axis= 30  deg   - ABNORMAL ECG -   Sinus rhythm   Consider right ventricular hypertrophy   Inferior infarct, old   Electronically Signed By:    Date and Time of Study: 2022 15:10:03          Meds given in ED:   Medications   sodium chloride 0.9 % flush 10 mL (has no administration in time range)   lactated ringers bolus 500 mL (500 mL Intravenous New Bag 22)   piperacillin-tazobactam (ZOSYN) 3.375 g in iso-osmotic dextrose 50 ml (premix) (has no administration in time range)   lactated ringers bolus 500 mL (0 mL Intravenous Stopped 22 1639)   rOPINIRole (REQUIP) tablet 1 mg (1 mg Oral Given 22 1641)   lactated ringers bolus 500 mL (0 mL Intravenous Stopped 22 181)       Imaging results:  CT Abdomen Pelvis Without Contrast    Result Date: 2022  1.  Long segment of colitis involving the splenic flexure through the distal descending/proximal sigmoid colon. The location and overall appearance is suggestive of ischemic colitis with diverticulitis other primary differential consideration. Infectious colitis is also possible but considered less likely. Please note evaluation is suboptimal without intravenous contrast. Gastroenterology consultation is recommended. 2.  But are nephrolithiasis without hydronephrosis. Other findings as above  This report was finalized on 2022 5:24 PM by Dr. Nolan Chavez M.D.       Ambulatory status:   Assist x2    Social issues:   Social History     Socioeconomic History    Marital status:    Tobacco Use    Smoking status: Former     Packs/day: 1.50     Years: 25.00     Pack years: 37.50     Types: Cigarettes     Quit date:      Years since quittin.8    Smokeless tobacco: Never   Substance and Sexual Activity    Alcohol use: Yes     Comment: occasional     Drug use: No    Sexual activity: Julian Li RN  11/13/22 18:18 EST

## 2022-11-13 NOTE — ED PROVIDER NOTES
EMERGENCY DEPARTMENT ENCOUNTER    CHIEF COMPLAINT  Chief Complaint: abdominal pain  History given by: patient  History limited by: pt poor historian  Room Number: 35/35  PMD: Elizabeth Dawkins APRN  Cardiologist: Xochilt Bautista transitioning to [place] Steve MD    HPI:  Pt is a 77 y.o. female presents complaining of abdominal pain over the past 2 days with vomiting x2 in the past 24 hours, with stool x1 without blood or black color.  Patient reports associated chest tightness and mild shortness of air, denies cough, fever, changes to urinary habits.  Patient denies swelling in her arms or legs.  EMS reports patient's blood pressure upon standing at home was 80 systolic, improved with lying on stretcher  Patient denies passing out, falls, visual or speech disturbance, reports lightheadedness at home especially with standing, decreased p.o. intake.  Patient reports he has had a hysterectomy in the past but denies other abdominal surgeries    Duration: 2 days  Associated Symptoms: Nausea, vomiting, loose stool x1, chest tightness, dyspnea, lightheadedness  Aggravating Factors: Eating or drinking, lightheadedness worse with standing  Alleviating Factors: Nothing  Treatment before arrival: EMS transport    Upon review the patient's chart it is noted:  Patient with a history of atrial tachycardia, sick sinus syndrome status post pacemaker orthostatic hypotension  Patient was stress test 6/17/2022 with perfusion study noted with an EF of greater than 70% with low risk stress test 6/17/2022    PAST MEDICAL HISTORY  Active Ambulatory Problems     Diagnosis Date Noted   • Essential hypertension 10/16/2018   • Sick sinus syndrome due to SA node dysfunction ([facility]) 10/16/2018   • Cerebral aneurysm, nonruptured 05/21/2019   • Other cerebrovascular disease 05/21/2019   • CKD (chronic kidney disease) stage 3, GFR 30-59 ml/min ([facility]) 07/31/2019   • Tricuspid regurgitation 07/31/2019   • Mitral valve regurgitation 07/31/2019   •  Presence of cardiac pacemaker 07/31/2019   • Right carotid bruit 08/11/2020   • Thoracic outlet syndrome associated with cervical rib 08/11/2020   • Mixed hyperlipidemia 02/09/2021   • Hyperparathyroidism (HCC) 10/24/2016   • Thyroid nodule 10/24/2016   • Chronic allergic rhinitis due to pollen 05/10/2021   • Acquired hypothyroidism 05/10/2021   • Gastroesophageal reflux disease without esophagitis 05/10/2021   • Dependent edema 05/10/2021   • Valvular heart disease 06/21/2021   • Restless legs syndrome (RLS) 04/19/2022   • Arthritis of carpometacarpal (CMC) joint of both thumbs 04/19/2022   • Atrial tachycardia (HCC) 06/06/2022   • Dyspnea on exertion 06/06/2022     Resolved Ambulatory Problems     Diagnosis Date Noted   • Dyslipidemia 08/11/2020     Past Medical History:   Diagnosis Date   • Acid reflux    • Arthritis    • Arthritis of carpometacarpal (CMC) joint of right thumb    • Francisco esophagus    • Basal cell carcinoma    • Brain aneurysm    • Burning with urination    • Cancer (HCC)    • Cerebral aneurysm    • Congestive heart failure (HCC)    • Depression    • Difficulty swallowing    • Disease of thyroid gland    • Encounter for Medicare annual wellness exam    • Esophageal reflux    • Exposure to chickenpox    • H/O TB skin testing    • Heart murmur    • High blood pressure    • History of hyperparathyroidism    • History of migraine headaches    • Hoarseness    • Hypercalcemia    • Hyperlipidemia    • Hypertension    • Hypothyroidism    • Leaky heart valve    • Low back pain    • Osteopenia with high risk of fracture    • Overweight with body mass index (BMI) of 25 to 25.9 in adult    • Pacemaker    • Palpitations    • Pseudocholinesterase deficiency    • Pulmonary hypertension (HCC)    • Rheumatic multiple valve disease    • RLS (restless legs syndrome)    • Rubella    • Seasonal allergies    • Skin disorder    • Slow to wake up after anesthesia    • Stage 3 chronic kidney disease (HCC)    • Symptoms  of urinary tract infection    • Thoracic outlet syndrome    • Thyroid disease    • Tinea cruris    • Tricuspid valve regurgitation    • Urinary hesitancy    • Vitamin D deficiency        PAST SURGICAL HISTORY  Past Surgical History:   Procedure Laterality Date   • CARDIAC CATHETERIZATION      Post-Proc Data:  Lesions successfully dilated    • CATARACT EXTRACTION WITH INTRAOCULAR LENS IMPLANT      LEFT AND RIGHT   • CEREBRAL ANGIOGRAM N/A 2019    Procedure: CEREBRAL ANGIOGRAM;  Surgeon: Ranjit Key MD;  Location: [facility] OR ;  Service: Neurosurgery   • COLONOSCOPY     • CYST REMOVAL Right     REMOVED UNDER RIGHT ARM. BENIGN   • EMBOLIZATION CEREBRAL N/A 2018    Procedure: Cerebral angiography and embolization of cerebral aneurysm with possible Neuroform stent;  Surgeon: Ranjit Key MD;  Location: [facility] OR ;  Service: Neurosurgery   • HYSTERECTOMY     • PACEMAKER IMPLANTATION Left     meevl SCIENTIFIC   • PARATHYROID GLAND SURGERY      ONE GLAND RIGHT   • THYROIDECTOMY, PARTIAL Right    • TONSILLECTOMY AND ADENOIDECTOMY     • UPPER GASTROINTESTINAL ENDOSCOPY         FAMILY HISTORY  Family History   Problem Relation Age of Onset   • Heart disease Father    • Stroke Father    • Heart attack Father    • Hypertension Father    • Stroke Maternal Grandmother    • Hypertension Mother    • Hypertension Sister    • Malig Hyperthermia Neg Hx        SOCIAL HISTORY  Social History     Socioeconomic History   • Marital status:    Tobacco Use   • Smoking status: Former     Packs/day: 1.50     Years: 25.00     Pack years: 37.50     Types: Cigarettes     Quit date:      Years since quittin.8   • Smokeless tobacco: Never   Substance and Sexual Activity   • Alcohol use: Yes     Comment: occasional   • Drug use: No   • Sexual activity: Defer       ALLERGIES  Codeine and Morphine and related    REVIEW OF SYSTEMS  Review of Systems   Constitutional: Negative for chills and  fever.   HENT: Negative for rhinorrhea and sore throat.    Eyes: Negative for visual disturbance.   Respiratory: Positive for chest tightness and shortness of breath. Negative for cough.    Cardiovascular: Negative for chest pain, palpitations and leg swelling.   Gastrointestinal: Positive for abdominal pain, diarrhea, nausea and vomiting.   Endocrine: Negative.    Genitourinary: Negative for decreased urine volume, dysuria and frequency.   Musculoskeletal: Negative for neck pain.   Skin: Negative for rash.   Neurological: Positive for light-headedness. Negative for syncope and headaches.   Psychiatric/Behavioral: Negative.    All other systems reviewed and are negative.      PHYSICAL EXAM  ED Triage Vitals   Temp Heart Rate Resp BP SpO2   11/13/22 1448 11/13/22 1447 11/13/22 1447 11/13/22 1447 11/13/22 1447   97.7 °F (36.5 °C) 76 18 128/68 99 %      Temp src Heart Rate Source Patient Position BP Location FiO2 (%)   11/13/22 1448 11/13/22 1447 11/13/22 1447 11/13/22 1447 --   Tympanic Monitor Sitting Right arm        Physical Exam  Vitals and nursing note reviewed.   Constitutional:       General: She is in acute distress (mild).      Appearance: She is not toxic-appearing.   HENT:      Head: Normocephalic and atraumatic.   Eyes:      Extraocular Movements: EOM normal.   Cardiovascular:      Rate and Rhythm: Normal rate and regular rhythm.      Pulses: Intact distal pulses.           Posterior tibial pulses are 2+ on the right side and 2+ on the left side.      Heart sounds: Normal heart sounds. No murmur heard.  Pulmonary:      Effort: Pulmonary effort is normal. No respiratory distress.      Breath sounds: Normal breath sounds.   Abdominal:      General: Bowel sounds are normal. There is no distension.      Palpations: Abdomen is soft.      Tenderness: There is generalized abdominal tenderness. There is no guarding or rebound.   Musculoskeletal:         General: No edema. Normal range of motion.      Cervical  back: Normal range of motion and neck supple.   Skin:     General: Skin is warm and dry.      Capillary Refill: Capillary refill takes less than 2 seconds.   Neurological:      Mental Status: She is alert and oriented to person, place, and time.   Psychiatric:         Mood and Affect: Affect normal.         LAB RESULTS  Lab Results (last 24 hours)     Procedure Component Value Units Date/Time    CBC & Differential [[id number]]  (Abnormal) Collected: 11/13/22 1525    Specimen: Blood Updated: 11/13/22 1537    Narrative:      The following orders were created for panel order CBC & Differential.  Procedure                               Abnormality         Status                     ---------                               -----------         ------                     CBC Auto Differential[[id number]]        Abnormal            Final result                 Please view results for these tests on the individual orders.    Comprehensive Metabolic Panel [[id number]]  (Abnormal) Collected: 11/13/22 1525    Specimen: Blood Updated: 11/13/22 1611     Glucose 153 mg/dL      BUN 33 mg/dL      Creatinine 2.04 mg/dL      Sodium 136 mmol/L      Potassium 5.2 mmol/L      Comment: Specimen hemolyzed.  Results may be affected.        Chloride 99 mmol/L      CO2 20.8 mmol/L      Calcium 11.3 mg/dL      Total Protein 8.6 g/dL      Albumin 5.00 g/dL      ALT (SGPT) 18 U/L      Comment: Specimen hemolyzed.  Results may be affected.        AST (SGOT) 30 U/L      Comment: Specimen hemolyzed.  Results may be affected.        Alkaline Phosphatase 143 U/L      Total Bilirubin 0.6 mg/dL      Globulin 3.6 gm/dL      A/G Ratio 1.4 g/dL      BUN/Creatinine Ratio 16.2     Anion Gap 16.2 mmol/L      eGFR 24.4 mL/min/1.73      Comment: National Kidney Foundation and American Society of Nephrology (ASN) Task Force recommended calculation based on the Chronic Kidney Disease Epidemiology Collaboration (CKD-EPI) equation refit without adjustment for race.        Narrative:      GFR Normal >60  Chronic Kidney Disease <60  Kidney Failure <15    The GFR formula is only valid for adults with stable renal function between ages 18 and 70.    Lipase [[id number]]  (Abnormal) Collected: 11/13/22 1525    Specimen: Blood Updated: 11/13/22 1559     Lipase 68 U/L     CBC Auto Differential [[id number]]  (Abnormal) Collected: 11/13/22 1525    Specimen: Blood Updated: 11/13/22 1537     WBC 18.85 10*3/mm3      RBC 4.79 10*6/mm3      Hemoglobin 14.6 g/dL      Hematocrit 43.2 %      MCV 90.2 fL      MCH 30.5 pg      MCHC 33.8 g/dL      RDW 12.8 %      RDW-SD 42.8 fl      MPV 10.9 fL      Platelets 294 10*3/mm3      Neutrophil % 85.8 %      Lymphocyte % 6.9 %      Monocyte % 4.2 %      Eosinophil % 2.1 %      Basophil % 0.6 %      Immature Grans % 0.4 %      Neutrophils, Absolute 16.18 10*3/mm3      Lymphocytes, Absolute 1.30 10*3/mm3      Monocytes, Absolute 0.79 10*3/mm3      Eosinophils, Absolute 0.39 10*3/mm3      Basophils, Absolute 0.11 10*3/mm3      Immature Grans, Absolute 0.08 10*3/mm3      nRBC 0.0 /100 WBC     Troponin [[id number]]  (Normal) Collected: 11/13/22 1525    Specimen: Blood Updated: 11/13/22 1559     Troponin T <0.010 ng/mL     Narrative:      Troponin T Reference Range:  <= 0.03 ng/mL-   Negative for AMI  >0.03 ng/mL-     Abnormal for myocardial necrosis.  Clinicians would have to utilize clinical acumen, EKG, Troponin and serial changes to determine if it is an Acute Myocardial Infarction or myocardial injury due to an underlying chronic condition.       Results may be falsely decreased if patient taking Biotin.      BNP [[id number]]  (Normal) Collected: 11/13/22 1525    Specimen: Blood Updated: 11/13/22 1558     proBNP 1,708.0 pg/mL     Narrative:      Among patients with dyspnea, NT-proBNP is highly sensitive for the detection of acute congestive heart failure. In addition NT-proBNP of <300 pg/ml effectively rules out acute congestive heart failure with 99% negative  predictive value.    Results may be falsely decreased if patient taking Biotin.      Clostridioides difficile Toxin - Stool, Per Rectum [[id number]]  (Normal) Collected: 11/13/22 1634    Specimen: Stool from Per Rectum Updated: 11/13/22 1731    Narrative:      The following orders were created for panel order Clostridioides difficile Toxin - Stool, Per Rectum.  Procedure                               Abnormality         Status                     ---------                               -----------         ------                     Clostridioides difficile...[[id number]]  Normal              Final result                 Please view results for these tests on the individual orders.    Gastrointestinal Panel, PCR - Stool, Per Rectum [[id number]]  (Normal) Collected: 11/13/22 1634    Specimen: Stool from Per Rectum Updated: 11/13/22 1801     Campylobacter Not Detected     Plesiomonas shigelloides Not Detected     Salmonella Not Detected     Vibrio Not Detected     Vibrio cholerae Not Detected     Yersinia enterocolitica Not Detected     Enteroaggregative E. coli (EAEC) Not Detected     Enteropathogenic E. coli (EPEC) Not Detected     Enterotoxigenic E. coli (ETEC) lt/st Not Detected     Shiga-like toxin-producing E. coli (STEC) stx1/stx2 Not Detected     Shigella/Enteroinvasive E. coli (EIEC) Not Detected     Cryptosporidium Not Detected     Cyclospora cayetanensis Not Detected     Entamoeba histolytica Not Detected     Giardia lamblia Not Detected     Adenovirus F40/41 Not Detected     Astrovirus Not Detected     Norovirus GI/GII Not Detected     Rotavirus A Not Detected     Sapovirus (I, II, IV or V) Not Detected    Narrative:      If Aeromonas, Staphylococcus aureus or Bacillus cereus are suspected, please order [id number]: Stool Culture, Aeromonas, S aureus, B Cereus.    Clostridioides difficile Toxin, PCR - Stool, Per Rectum [[id number]]  (Normal) Collected: 11/13/22 1634    Specimen: Stool from Per Rectum Updated:  11/13/22 1731     C. Difficile Toxins by PCR Negative    Narrative:      The result indicates the absence of toxigenic C. difficile from stool specimen.     Urinalysis With Microscopic If Indicated (No Culture) - Urine, Catheter [[id number]]  (Abnormal) Collected: 11/13/22 1639    Specimen: Urine, Catheter Updated: 11/13/22 1701     Color, UA Dark Yellow     Appearance, UA Cloudy     pH, UA <=5.0     Specific Gravity, UA 1.020     Glucose, UA Negative     Ketones, UA 15 mg/dL (1+)     Bilirubin, UA Negative     Blood, UA Negative     Protein,  mg/dL (2+)     Leuk Esterase, UA Small (1+)     Nitrite, UA Negative     Urobilinogen, UA 1.0 E.U./dL    Urinalysis, Microscopic Only - Urine, Catheter [[id number]]  (Abnormal) Collected: 11/13/22 1639    Specimen: Urine, Catheter Updated: 11/13/22 1718     RBC, UA 3-5 /HPF      WBC, UA 6-12 /HPF      Bacteria, UA None Seen /HPF      Squamous Epithelial Cells, UA 3-6 /HPF      Hyaline Casts, UA Too Numerous to Count /LPF      Methodology Manual Light Microscopy          I ordered the above labs and reviewed the results    RADIOLOGY  CT Abdomen Pelvis Without Contrast   Final Result   1.  Long segment of colitis involving the splenic flexure through the   distal descending/proximal sigmoid colon. The location and overall   appearance is suggestive of ischemic colitis with diverticulitis other   primary differential consideration. Infectious colitis is also possible   but considered less likely. Please note evaluation is suboptimal without   intravenous contrast. Gastroenterology consultation is recommended.   2.  But are nephrolithiasis without hydronephrosis. Other findings as   above       This report was finalized on 11/13/2022 5:24 PM by Dr. Nolan Chavez M.D.          XR Chest 1 View   Final Result         Long segment of colitis involving the splenic flexure through the  distal descending/proximal sigmoid colon. The location and overall appearance is suggestive of  ischemic colitis with diverticulitis other primary differential consideration. Infectious colitis is also possible but considered less likely. Please note evaluation is suboptimal without intravenous contrast. Gastroenterology consultation is recommended.  I ordered the above noted radiological studies. Interpreted by radiologist. Viewed by me in PACS.     Chest x-ray-no acute disease    PROCEDURES  Procedures      PROGRESS AND CONSULTS  ED Course as of 11/13/22 1817   Sun Nov 13, 2022   1807 Discussed with Dr. Jaimes, on-call for [facility] who is aware of patient's presentation, imaging, labs and agrees to admit for further testing, treatment as needed. [TO]   1816 Ottoniel with Dr. Smith, on-call for general surgery who is aware of patient's presentation, has reviewed imaging results, does suggest antibiotics given high likelihood the patient has diverticulitis causing her symptomatology and imaging results and agrees to see in consult [TO]      ED Course User Index  [TO] Amira Sparrow MD     EKG 1510      EKG time: 1510  Rhythm/Rate: Sinus rhythm, rate in the 60s  P waves and VA: Normal P waves, normal CIERA's  QRS, axis: Q's in the inferior leads  ST and T waves: Unremarkable    Interpreted Contemporaneously by me, independently viewed  Minimally changed compared to prior 6/12/2019, atrial pacing no longer detectable    Heart score 4    MEDICAL DECISION MAKING  Results were reviewed/discussed with the patient and they were also made aware of online access. Pt also made aware that some labs, such as cultures, will not be resulted during ER visit and followup with PMD is necessary.       MDM       DIAGNOSIS  Final diagnoses:   Abdominal pain, unspecified abdominal location   Acute renal insufficiency   Hypotension, unspecified hypotension type   Leukocytosis, unspecified type   Chest pain, unspecified type   Colitis       DISPOSITION  ADMISSION    Discussed treatment plan and reason for admission with pt/family and admitting  physician.  Pt/family voiced understanding of the plan for admission for further testing/treatment as needed.         Latest Documented Vital Signs:  As of 18:17 EST  BP- 137/70 HR- 60 Temp- 97.7 °F (36.5 °C) (Tympanic) O2 sat- 99%    --  Patient was wearing facemask when I entered the room and throughout our encounter. Full protective equipment was worn throughout this patient encounter including a face mask, eye protection and gloves. Hand hygiene was performed before donning protective equipment and after removal when leaving the room.      Amira Sparrow MD  11/13/22 [id number]

## 2022-11-14 LAB
ALBUMIN SERPL-MCNC: 3.5 G/DL (ref 3.5–5.2)
ALBUMIN/GLOB SERPL: 1.5 G/DL
ALP SERPL-CCNC: 86 U/L (ref 39–117)
ALT SERPL W P-5'-P-CCNC: 15 U/L (ref 1–33)
ANION GAP SERPL CALCULATED.3IONS-SCNC: 12.2 MMOL/L (ref 5–15)
AST SERPL-CCNC: 20 U/L (ref 1–32)
BASOPHILS # BLD AUTO: 0.04 10*3/MM3 (ref 0–0.2)
BASOPHILS NFR BLD AUTO: 0.3 % (ref 0–1.5)
BILIRUB SERPL-MCNC: 0.5 MG/DL (ref 0–1.2)
BUN SERPL-MCNC: 33 MG/DL (ref 8–23)
BUN/CREAT SERPL: 20.4 (ref 7–25)
CALCIUM SPEC-SCNC: 8.9 MG/DL (ref 8.6–10.5)
CHLORIDE SERPL-SCNC: 106 MMOL/L (ref 98–107)
CO2 SERPL-SCNC: 21.8 MMOL/L (ref 22–29)
CREAT SERPL-MCNC: 1.62 MG/DL (ref 0.57–1)
D-LACTATE SERPL-SCNC: 1.8 MMOL/L (ref 0.5–2)
DEPRECATED RDW RBC AUTO: 44.5 FL (ref 37–54)
EGFRCR SERPLBLD CKD-EPI 2021: 32.2 ML/MIN/1.73
EOSINOPHIL # BLD AUTO: 0.01 10*3/MM3 (ref 0–0.4)
EOSINOPHIL NFR BLD AUTO: 0.1 % (ref 0.3–6.2)
ERYTHROCYTE [DISTWIDTH] IN BLOOD BY AUTOMATED COUNT: 13.2 % (ref 12.3–15.4)
GLOBULIN UR ELPH-MCNC: 2.3 GM/DL
GLUCOSE SERPL-MCNC: 122 MG/DL (ref 65–99)
HCT VFR BLD AUTO: 30.7 % (ref 34–46.6)
HGB BLD-MCNC: 10.2 G/DL (ref 12–15.9)
IMM GRANULOCYTES # BLD AUTO: 0.05 10*3/MM3 (ref 0–0.05)
IMM GRANULOCYTES NFR BLD AUTO: 0.4 % (ref 0–0.5)
LYMPHOCYTES # BLD AUTO: 1.1 10*3/MM3 (ref 0.7–3.1)
LYMPHOCYTES NFR BLD AUTO: 8.2 % (ref 19.6–45.3)
MCH RBC QN AUTO: 30.4 PG (ref 26.6–33)
MCHC RBC AUTO-ENTMCNC: 33.2 G/DL (ref 31.5–35.7)
MCV RBC AUTO: 91.6 FL (ref 79–97)
MONOCYTES # BLD AUTO: 0.8 10*3/MM3 (ref 0.1–0.9)
MONOCYTES NFR BLD AUTO: 6 % (ref 5–12)
NEUTROPHILS NFR BLD AUTO: 11.34 10*3/MM3 (ref 1.7–7)
NEUTROPHILS NFR BLD AUTO: 85 % (ref 42.7–76)
NRBC BLD AUTO-RTO: 0 /100 WBC (ref 0–0.2)
PLATELET # BLD AUTO: 165 10*3/MM3 (ref 140–450)
PMV BLD AUTO: 11.1 FL (ref 6–12)
POTASSIUM SERPL-SCNC: 4.6 MMOL/L (ref 3.5–5.2)
PROT SERPL-MCNC: 5.8 G/DL (ref 6–8.5)
RBC # BLD AUTO: 3.35 10*6/MM3 (ref 3.77–5.28)
SODIUM SERPL-SCNC: 140 MMOL/L (ref 136–145)
WBC NRBC COR # BLD: 13.34 10*3/MM3 (ref 3.4–10.8)

## 2022-11-14 PROCEDURE — 80053 COMPREHEN METABOLIC PANEL: CPT | Performed by: INTERNAL MEDICINE

## 2022-11-14 PROCEDURE — 83605 ASSAY OF LACTIC ACID: CPT | Performed by: EMERGENCY MEDICINE

## 2022-11-14 PROCEDURE — G0378 HOSPITAL OBSERVATION PER HR: HCPCS

## 2022-11-14 PROCEDURE — 85025 COMPLETE CBC W/AUTO DIFF WBC: CPT | Performed by: INTERNAL MEDICINE

## 2022-11-14 PROCEDURE — 25010000002 PIPERACILLIN SOD-TAZOBACTAM PER 1 G: Performed by: INTERNAL MEDICINE

## 2022-11-14 PROCEDURE — 99203 OFFICE O/P NEW LOW 30 MIN: CPT | Performed by: SURGERY

## 2022-11-14 PROCEDURE — 96361 HYDRATE IV INFUSION ADD-ON: CPT

## 2022-11-14 RX ORDER — DIPHENHYDRAMINE HYDROCHLORIDE 50 MG/ML
25 INJECTION INTRAMUSCULAR; INTRAVENOUS EVERY 6 HOURS PRN
Status: DISCONTINUED | OUTPATIENT
Start: 2022-11-14 | End: 2022-11-15 | Stop reason: HOSPADM

## 2022-11-14 RX ORDER — ACETAMINOPHEN 325 MG/1
650 TABLET ORAL EVERY 6 HOURS PRN
Status: DISCONTINUED | OUTPATIENT
Start: 2022-11-14 | End: 2022-11-15 | Stop reason: HOSPADM

## 2022-11-14 RX ADMIN — ATORVASTATIN CALCIUM 40 MG: 20 TABLET, FILM COATED ORAL at 08:51

## 2022-11-14 RX ADMIN — OXYCODONE HYDROCHLORIDE AND ACETAMINOPHEN 1000 MG: 500 TABLET ORAL at 20:13

## 2022-11-14 RX ADMIN — LEVOTHYROXINE SODIUM 75 MCG: 0.07 TABLET ORAL at 06:55

## 2022-11-14 RX ADMIN — ACETAMINOPHEN 650 MG: 325 TABLET, FILM COATED ORAL at 01:21

## 2022-11-14 RX ADMIN — MONTELUKAST SODIUM 10 MG: 10 TABLET, FILM COATED ORAL at 20:14

## 2022-11-14 RX ADMIN — TAZOBACTAM SODIUM AND PIPERACILLIN SODIUM 3.38 G: 375; 3 INJECTION, SOLUTION INTRAVENOUS at 09:00

## 2022-11-14 RX ADMIN — Medication 10 ML: at 02:09

## 2022-11-14 RX ADMIN — ROPINIROLE 1 MG: 1 TABLET, FILM COATED ORAL at 15:48

## 2022-11-14 RX ADMIN — Medication 10 ML: at 09:00

## 2022-11-14 RX ADMIN — ASPIRIN 325 MG: 325 TABLET, COATED ORAL at 08:51

## 2022-11-14 RX ADMIN — TAZOBACTAM SODIUM AND PIPERACILLIN SODIUM 3.38 G: 375; 3 INJECTION, SOLUTION INTRAVENOUS at 00:42

## 2022-11-14 RX ADMIN — SODIUM CHLORIDE 100 ML/HR: 9 INJECTION, SOLUTION INTRAVENOUS at 11:59

## 2022-11-14 RX ADMIN — ROPINIROLE 1 MG: 1 TABLET, FILM COATED ORAL at 20:14

## 2022-11-14 RX ADMIN — Medication 2000 UNITS: at 08:51

## 2022-11-14 RX ADMIN — Medication 10 ML: at 20:14

## 2022-11-14 NOTE — H&P
Internal medicine history and physical  INTERNAL MEDICINE   [facility]       Patient Identification:  Name: Meme Morales  Age: 77 y.o.  Sex: female  :  1945  MRN: [id number]                   Primary Care Physician: Elizabeth Dawkins APRN                               Date of admission:2022    Chief Complaint: Generalized fatigue and not feeling very well for the last 5 days, blood pressure dropping upon getting up and walking around for the last 2 days, abdominal discomfort with nausea and dry heaving over the course of last 24 hours.    History of Present Illness:   Patient is a 77-year-old female who has complicated past medical history as noted below was in her usual state of her health until about 4 - 5 days ago when she started noticing that she was getting progressively weak especially when she gets up and walk around.  She checked her blood pressure and did notice a drop in blood pressure from 120s and 130s when she is sitting and laying to 90s when she is getting up and walking around.  She had associated dizziness.  Despite this she was still taking her blood pressure medications.  In this background couple days ago patient started having abdominal discomfort with dry heaving her symptoms getting worse with decreased appetite.  Because of the symptoms patient came to the emergency room upon further evaluation and was found to have acute kidney injury with creatinine of 2.04, elevated blood sugar calcium of 11.3, lipase of 68 with normal BNP and negative test for C. difficile and enteric pathogen.  She was noted to have white blood cell count of 18,000 and abnormal urinalysis with small leuks trace and 6-12 WBCs but this sample was clean-catch.  CT scan of the abdomen and pelvis showed long segment of colitis involving the splenic flexure through the descending/proximal sigmoid colon concerning for ischemic colitis with diverticulitis.  Patient's case was  discussed with general surgery service by the ER physician and is being admitted for further care.  Patient has been started on IV Zosyn with plans for surgical consultation.  Patient did receive IV fluids in the emergency room.  Patient is feeling somewhat better.      Past Medical History:  Past Medical History:   Diagnosis Date   • Acid reflux    • Arthritis    • Arthritis of carpometacarpal (CMC) joint of right thumb    • Francisco esophagus    • Basal cell carcinoma    • Brain aneurysm    • Burning with urination    • Cancer (HCC)     non malignant skin cancers   • Cerebral aneurysm    • Congestive heart failure (HCC)    • Depression    • Difficulty swallowing    • Disease of thyroid gland     HISTORY OF NODULES, SURGERY.    • Encounter for Medicare annual wellness exam    • Esophageal reflux    • Exposure to chickenpox    • H/O TB skin testing     WITH POSITIVE RESULT. NEG CXR.   • Heart murmur    • High blood pressure    • History of hyperparathyroidism    • History of migraine headaches    • Hoarseness    • Hypercalcemia    • Hyperlipidemia    • Hypertension    • Hypothyroidism    • Leaky heart valve    • Low back pain    • Osteopenia with high risk of fracture    • Overweight with body mass index (BMI) of 25 to 25.9 in adult    • Pacemaker     IMPLANTED LEFT SIDE   • Palpitations    • Pseudocholinesterase deficiency     AUNT ON FATHER'S SIDE OF FAMILY   • Pulmonary hypertension ([facility])    • Rheumatic multiple valve disease    • RLS (restless legs syndrome)    • Rubella    • Seasonal allergies    • Sick sinus syndrome due to SA node dysfunction ([facility])     REASON FOR PACEMAKER   • Skin disorder    • Slow to wake up after anesthesia    • Stage 3 chronic kidney disease (HCC)    • Symptoms of urinary tract infection    • Thoracic outlet syndrome    • Thyroid disease    • Tinea cruris    • Tricuspid valve regurgitation    • Urinary hesitancy    • Valvular heart disease    • Vitamin D deficiency      Past Surgical  History:  Past Surgical History:   Procedure Laterality Date   • CARDIAC CATHETERIZATION      Post-Proc Data:  Lesions successfully dilated    • CATARACT EXTRACTION WITH INTRAOCULAR LENS IMPLANT      LEFT AND RIGHT   • CEREBRAL ANGIOGRAM N/A 6/26/2019    Procedure: CEREBRAL ANGIOGRAM;  Surgeon: Ranjit Key MD;  Location: [facility] OR 18/19;  Service: Neurosurgery   • COLONOSCOPY     • CYST REMOVAL Right     REMOVED UNDER RIGHT ARM. BENIGN   • EMBOLIZATION CEREBRAL N/A 11/26/2018    Procedure: Cerebral angiography and embolization of cerebral aneurysm with possible Neuroform stent;  Surgeon: Ranjit Key MD;  Location: [facility] OR 18/19;  Service: Neurosurgery   • HYSTERECTOMY     • PACEMAKER IMPLANTATION Left     [employer]   • PARATHYROID GLAND SURGERY      ONE GLAND RIGHT   • THYROIDECTOMY, PARTIAL Right    • TONSILLECTOMY AND ADENOIDECTOMY     • UPPER GASTROINTESTINAL ENDOSCOPY        Home Meds:  (Not in a hospital admission)    Current Meds:     Current Facility-Administered Medications:   •  sodium chloride 0.9 % flush 10 mL, 10 mL, Intravenous, PRN, Amira Sparrow MD    Current Outpatient Medications:   •  Acetaminophen 500 MG capsule, Daily., Disp: , Rfl:   •  ascorbic acid (VITAMIN C) 1000 MG tablet, Daily., Disp: , Rfl:   •  aspirin  MG tablet, Take 1 tablet by mouth Daily., Disp: 30 tablet, Rfl: 6  •  atorvastatin (LIPITOR) 40 MG tablet, TAKE 1 TABLET BY MOUTH  DAILY, Disp: 90 tablet, Rfl: 3  •  azelastine (ASTEPRO) 0.15 % solution nasal spray, 2 sprays into the nostril(s) as directed by provider 2 (Two) Times a Day. as directed, Disp: 120 mL, Rfl: 1  •  Cholecalciferol (VITAMIN D3) 2000 units tablet, Take 2,000 Units by mouth Daily., Disp: , Rfl:   •  flunisolide (NASALIDE) 25 MCG/ACT (0.025%) solution nasal spray, USE 2 SPRAYS NASALLY EVERY 12 HOURS, Disp: 100 mL, Rfl: 1  •  furosemide (Lasix) 40 MG tablet, Take 1 tablet by mouth Daily., Disp: 90 tablet, Rfl: 1  •   levothyroxine (SYNTHROID, LEVOTHROID) 75 MCG tablet, TAKE 1 TABLET BY MOUTH  DAILY, Disp: 90 tablet, Rfl: 3  •  lisinopril-hydrochlorothiazide (PRINZIDE,ZESTORETIC) 10-12.5 MG per tablet, , Disp: , Rfl:   •  montelukast (SINGULAIR) 10 MG tablet, TAKE 1 TABLET BY MOUTH  DAILY, Disp: 90 tablet, Rfl: 3  •  polycarbophil (calcium polycarbophil) 625 MG tablet tablet, Every 6 (Six) Hours., Disp: , Rfl:   •  pantoprazole (PROTONIX) 40 MG EC tablet, TAKE 1 TABLET BY MOUTH  DAILY, Disp: 90 tablet, Rfl: 3  •  rOPINIRole (REQUIP) 1 MG tablet, Take 1 tablet by mouth 3 (Three) Times a Day., Disp: 270 tablet, Rfl: 1  Allergies:  Allergies   Allergen Reactions   • Codeine Other (See Comments)     HYPOTENTION   • Morphine And Related Hallucinations     Social History:   Social History     Tobacco Use   • Smoking status: Former     Packs/day: 1.50     Years: 25.00     Pack years: 37.50     Types: Cigarettes     Quit date:      Years since quittin.8   • Smokeless tobacco: Never   Substance Use Topics   • Alcohol use: Yes     Comment: occasional      Family History:  Family History   Problem Relation Age of Onset   • Heart disease Father    • Stroke Father    • Heart attack Father    • Hypertension Father    • Stroke Maternal Grandmother    • Hypertension Mother    • Hypertension Sister    • Malig Hyperthermia Neg Hx           Review of Systems  See history of present illness and past medical history.   At present feeling better.  At the time of ER evaluation her review of system was recorded as follows:  Constitutional: Negative for chills and fever.   HENT: Negative for rhinorrhea and sore throat.    Eyes: Negative for visual disturbance.   Respiratory: Positive for chest tightness and shortness of breath. Negative for cough.    Cardiovascular: Negative for chest pain, palpitations and leg swelling.   Gastrointestinal: Positive for abdominal pain, diarrhea, nausea and vomiting.   Endocrine: Negative.    Genitourinary:  Negative for decreased urine volume, dysuria and frequency.   Musculoskeletal: Negative for neck pain.   Skin: Negative for rash.   Neurological: Positive for light-headedness. Negative for syncope and headaches.   Psychiatric/Behavioral: Negative.    All other systems reviewed and are negative.    Vitals:   /51   Pulse 60   Temp 97.7 °F (36.5 °C) (Tympanic)   Resp 18   SpO2 99%   I/O:     Intake/Output Summary (Last 24 hours) at 11/13/2022 2025  Last data filed at 11/13/2022 1937  Gross per 24 hour   Intake 1550 ml   Output --   Net 1550 ml     Exam:  Patient is examined using the personal protective equipment as per guidelines from infection control for this particular patient as enacted.  Hand washing was performed before and after patient interaction.  General Appearance:   Alert cooperative female who appears to be in no acute distress   Head:    Normocephalic, without obvious abnormality, atraumatic   Eyes:    PERRL, conjunctiva/corneas clear, EOM's intact, both eyes   Ears:    Normal external ear canals, both ears   Nose:   Nares normal, septum midline, mucosa normal, no drainage    or sinus tenderness   Throat:   Lips, tongue, gums normal; oral mucosa pink and moist   Neck:   Supple, symmetrical, trachea midline, no adenopathy;     thyroid:  no enlargement/tenderness/nodules; no carotid    bruit or JVD   Back:     Symmetric, no curvature, ROM normal, no CVA tenderness   Lungs:     Clear to auscultation bilaterally, respirations unlabored   Chest Wall:    No tenderness or deformity    Heart:   S1-S2 regular   Abdomen:    Soft mild generalized tenderness but no guarding rigidity or rebound noted   Extremities:   Extremities normal, atraumatic, no cyanosis or edema   Pulses:   Pulses palpable in all extremities; symmetric all extremities   Skin:   Skin color normal, Skin is warm and dry,  no rashes or palpable lesions   Neurologic:  Alert and oriented and grossly nonfocal       Data Review:      I  reviewed the patient's new clinical results.  Results from last 7 days   Lab Units 11/13/22  1525   WBC 10*3/mm3 18.85*   HEMOGLOBIN g/dL 14.6   PLATELETS 10*3/mm3 294     Results from last 7 days   Lab Units 11/13/22  1525   SODIUM mmol/L 136   POTASSIUM mmol/L 5.2   CHLORIDE mmol/L 99   CO2 mmol/L 20.8*   BUN mg/dL 33*   CREATININE mg/dL 2.04*   CALCIUM mg/dL 11.3*   GLUCOSE mg/dL 153*     CT Abdomen Pelvis Without Contrast    Result Date: 11/13/2022  1.  Long segment of colitis involving the splenic flexure through the distal descending/proximal sigmoid colon. The location and overall appearance is suggestive of ischemic colitis with diverticulitis other primary differential consideration. Infectious colitis is also possible but considered less likely. Please note evaluation is suboptimal without intravenous contrast. Gastroenterology consultation is recommended. 2.  But are nephrolithiasis without hydronephrosis. Other findings as above  This report was finalized on 11/13/2022 5:24 PM by Dr. Nolan Chavez M.D.      Brief Urine Lab Results  (Last result in the past 365 days)      Color   Clarity   Blood   Leuk Est   Nitrite   Protein   CREAT   Urine HCG        11/13/22 1639 Dark Yellow   Cloudy   Negative   Small (1+)   Negative   100 mg/dL (2+)               Microbiology Results (last 10 days)     Procedure Component Value - Date/Time    Clostridioides difficile Toxin - Stool, Per Rectum [[id number]]  (Normal) Collected: 11/13/22 1634    Lab Status: Final result Specimen: Stool from Per Rectum Updated: 11/13/22 1731    Narrative:      The following orders were created for panel order Clostridioides difficile Toxin - Stool, Per Rectum.  Procedure                               Abnormality         Status                     ---------                               -----------         ------                     Clostridioides difficile...[[id number]]  Normal              Final result                 Please view  results for these tests on the individual orders.    Gastrointestinal Panel, PCR - Stool, Per Rectum [[id number]]  (Normal) Collected: 11/13/22 1634    Lab Status: Final result Specimen: Stool from Per Rectum Updated: 11/13/22 1801     Campylobacter Not Detected     Plesiomonas shigelloides Not Detected     Salmonella Not Detected     Vibrio Not Detected     Vibrio cholerae Not Detected     Yersinia enterocolitica Not Detected     Enteroaggregative E. coli (EAEC) Not Detected     Enteropathogenic E. coli (EPEC) Not Detected     Enterotoxigenic E. coli (ETEC) lt/st Not Detected     Shiga-like toxin-producing E. coli (STEC) stx1/stx2 Not Detected     Shigella/Enteroinvasive E. coli (EIEC) Not Detected     Cryptosporidium Not Detected     Cyclospora cayetanensis Not Detected     Entamoeba histolytica Not Detected     Giardia lamblia Not Detected     Adenovirus F40/41 Not Detected     Astrovirus Not Detected     Norovirus GI/GII Not Detected     Rotavirus A Not Detected     Sapovirus (I, II, IV or V) Not Detected    Narrative:      If Aeromonas, Staphylococcus aureus or Bacillus cereus are suspected, please order [id number]: Stool Culture, Aeromonas, S aureus, B Cereus.    Clostridioides difficile Toxin, PCR - Stool, Per Rectum [[id number]]  (Normal) Collected: 11/13/22 1634    Lab Status: Final result Specimen: Stool from Per Rectum Updated: 11/13/22 1731     C. Difficile Toxins by PCR Negative    Narrative:      The result indicates the absence of toxigenic C. difficile from stool specimen.           Assessment:  Active Hospital Problems    Diagnosis  POA   • **Abdominal pain, unspecified abdominal location [R10.9]  Yes   • UTI (urinary tract infection) [N39.0]  Unknown   • Colitis [K52.9]  Unknown   • MACY (acute kidney injury) ([facility]) [N17.9]  Unknown   • Restless legs syndrome (RLS) [G25.81]  Yes   • Acquired hypothyroidism [E03.9]  Yes   • CKD (chronic kidney disease) stage 3, GFR 30-59 ml/min ([facility]) [N18.30]  Yes   •  Presence of cardiac pacemaker [Z95.0]  Yes     [place]     • Cerebral aneurysm, nonruptured [I67.1]  Yes     Added automatically from request for surgery [id number]     • Essential hypertension [I10]  Yes   • Sick sinus syndrome due to SA node dysfunction (HCC) [I49.5]  Yes       Plan: See admitting orders  · Continue with IV hydration with plans to withhold antihypertensive to avoid relative hypotension and potential worsening of suspected ischemic colitis  · Continue with IV Zosyn  · Monitor renal function  · Follow-up on urine culture  · Further management as her condition evolves.  Ayaz Jaimes MD   11/13/2022  20:25 EST    Parts of this note may be an electronic transcription/translation of spoken language to printed text using the Dragon dictation system.

## 2022-11-14 NOTE — CONSULTS
General Surgery Consultation    Consulting Physician: Janeth Smith MD    CC: Abdominal pain    HPI:   The patient is a very pleasant 77 y.o. female who presented to the hospital with fatigue and low blood pressure at home, as well as abdominal pain, dry heaving and decreased appetite.      Past Medical History:  Past Medical History:   Diagnosis Date   • Acid reflux    • Arthritis    • Arthritis of carpometacarpal (CMC) joint of right thumb    • Francisco esophagus    • Basal cell carcinoma    • Brain aneurysm    • Burning with urination    • Cancer (HCC)     non malignant skin cancers   • Cerebral aneurysm    • Congestive heart failure (HCC)    • Depression    • Difficulty swallowing    • Disease of thyroid gland     HISTORY OF NODULES, SURGERY.    • Encounter for Medicare annual wellness exam    • Esophageal reflux    • Exposure to chickenpox    • H/O TB skin testing     WITH POSITIVE RESULT. NEG CXR.   • Heart murmur    • High blood pressure    • History of hyperparathyroidism    • History of migraine headaches    • Hoarseness    • Hypercalcemia    • Hyperlipidemia    • Hypertension    • Hypothyroidism    • Leaky heart valve    • Low back pain    • Osteopenia with high risk of fracture    • Overweight with body mass index (BMI) of 25 to 25.9 in adult    • Pacemaker     IMPLANTED LEFT SIDE   • Palpitations    • Pseudocholinesterase deficiency     AUNT ON FATHER'S SIDE OF FAMILY   • Pulmonary hypertension (HCC)    • Rheumatic multiple valve disease    • RLS (restless legs syndrome)    • Rubella    • Seasonal allergies    • Sick sinus syndrome due to SA node dysfunction ([facility])     REASON FOR PACEMAKER   • Skin disorder    • Slow to wake up after anesthesia    • Stage 3 chronic kidney disease (HCC)    • Symptoms of urinary tract infection    • Thoracic outlet syndrome    • Thyroid disease    • Tinea cruris    • Tricuspid valve regurgitation    • Urinary hesitancy    • Valvular heart disease    • Vitamin D deficiency         Past Surgical History:  Past Surgical History:   Procedure Laterality Date   • CARDIAC CATHETERIZATION      Post-Proc Data:  Lesions successfully dilated    • CATARACT EXTRACTION WITH INTRAOCULAR LENS IMPLANT      LEFT AND RIGHT   • CEREBRAL ANGIOGRAM N/A 6/26/2019    Procedure: CEREBRAL ANGIOGRAM;  Surgeon: Ranjit Key MD;  Location: [facility] OR 18/19;  Service: Neurosurgery   • COLONOSCOPY     • CYST REMOVAL Right     REMOVED UNDER RIGHT ARM. BENIGN   • EMBOLIZATION CEREBRAL N/A 11/26/2018    Procedure: Cerebral angiography and embolization of cerebral aneurysm with possible Neuroform stent;  Surgeon: Ranjit Key MD;  Location: [facility] OR 18/19;  Service: Neurosurgery   • HYSTERECTOMY     • PACEMAKER IMPLANTATION Left     [employer]   • PARATHYROID GLAND SURGERY      ONE GLAND RIGHT   • THYROIDECTOMY, PARTIAL Right    • TONSILLECTOMY AND ADENOIDECTOMY     • UPPER GASTROINTESTINAL ENDOSCOPY         Medications:  Medications Prior to Admission   Medication Sig Dispense Refill Last Dose   • Acetaminophen 500 MG capsule Daily.      • ascorbic acid (VITAMIN C) 1000 MG tablet Daily.   11/12/2022   • aspirin  MG tablet Take 1 tablet by mouth Daily. 30 tablet 6 11/13/2022   • atorvastatin (LIPITOR) 40 MG tablet TAKE 1 TABLET BY MOUTH  DAILY 90 tablet 3 11/13/2022   • azelastine (ASTEPRO) 0.15 % solution nasal spray 2 sprays into the nostril(s) as directed by provider 2 (Two) Times a Day. as directed 120 mL 1    • Cholecalciferol (VITAMIN D3) 2000 units tablet Take 2,000 Units by mouth Daily.   11/13/2022   • flunisolide (NASALIDE) 25 MCG/ACT (0.025%) solution nasal spray USE 2 SPRAYS NASALLY EVERY 12 HOURS 100 mL 1    • furosemide (Lasix) 40 MG tablet Take 1 tablet by mouth Daily. 90 tablet 1 Past Week   • levothyroxine (SYNTHROID, LEVOTHROID) 75 MCG tablet TAKE 1 TABLET BY MOUTH  DAILY 90 tablet 3 11/13/2022   • lisinopril-hydrochlorothiazide (PRINZIDE,ZESTORETIC) 10-12.5 MG  per tablet    2022   • montelukast (SINGULAIR) 10 MG tablet TAKE 1 TABLET BY MOUTH  DAILY 90 tablet 3 2022   • polycarbophil (calcium polycarbophil) 625 MG tablet tablet Every 6 (Six) Hours.   2022   • pantoprazole (PROTONIX) 40 MG EC tablet TAKE 1 TABLET BY MOUTH  DAILY 90 tablet 3    • rOPINIRole (REQUIP) 1 MG tablet Take 1 tablet by mouth 3 (Three) Times a Day. 270 tablet 1        Allergies:   Allergies   Allergen Reactions   • Codeine Other (See Comments)     HYPOTENTION   • Morphine And Related Hallucinations       Social History:   Social History     Socioeconomic History   • Marital status:    Tobacco Use   • Smoking status: Former     Packs/day: 1.50     Years: 25.00     Pack years: 37.50     Types: Cigarettes     Quit date:      Years since quittin.8   • Smokeless tobacco: Never   Substance and Sexual Activity   • Alcohol use: Yes     Comment: occasional   • Drug use: No   • Sexual activity: Defer       Family History:   Family History   Problem Relation Age of Onset   • Heart disease Father    • Stroke Father    • Heart attack Father    • Hypertension Father    • Stroke Maternal Grandmother    • Hypertension Mother    • Hypertension Sister    • Malig Hyperthermia Neg Hx        Review of Systems:  Constitutional: denies any weight changes, reports fatigue, weakness  Eyes: denies blurred/double vision or scleral icterus  Cardiovascular: denies chest pain, palpitations, or edemas  Respiratory: denies cough, sputum, or dyspnea  Gastrointestinal:  Reports abdominal pain, nausea   Genitourinary: denies dysuria or hematuria  Endocrine: denies cold intolerance, lethargy, or flushing  Hematologic: denies excessive bruising or bleeding  Musculoskeletal: denies weakness, joint swelling, joint pain, or stiffness  Neurologic: denies seizures, CVA, paresthesia, or peripheral neuropathy  Skin: denies change in nevi, rashes, masses, or jaundice     All other systems reviewed and were  negative.    Physical Exam:   Vitals:    11/14/22 0429   BP:    Pulse: 60   Resp:    Temp:    SpO2: 94%     Height: 66.5 inches  Weight: 76.5  BMI: 26.8  GENERAL: awake and alert, no acute distress, oriented to person, place, and time  HEENT: normocephalic, atraumatic, no scleral icterus, moist mucous membranes  NECK: Supple, there is no thyromegaly or lymphadenopathy  RESPIRATORY: clear to auscultation, no wheezes, rales or rhonchi, symmetric air entry  CARDIOVASCULAR: regular rate and rhythm    GASTROINTESTINAL: Soft, nondistended, mildly tender to palpation throughout, no rebound or guarding  MUSCULOSKELETAL: no cyanosis, clubbing, or edema   NEUROLOGIC: alert and oriented, normal speech, cranial nerves 2-12 grossly intact, no focal deficits   SKIN: Moist, warm, no rashes, no jaundice      Diagnostic work-up:     Pertinent labs:   Results from last 7 days   Lab Units 11/14/22  0440 11/13/22  1525   WBC 10*3/mm3 13.34* 18.85*   HEMOGLOBIN g/dL 10.2* 14.6   HEMATOCRIT % 30.7* 43.2   PLATELETS 10*3/mm3 165 294     Results from last 7 days   Lab Units 11/14/22  0440 11/13/22  1525   SODIUM mmol/L 140 136   POTASSIUM mmol/L 4.6 5.2   CHLORIDE mmol/L 106 99   CO2 mmol/L 21.8* 20.8*   BUN mg/dL 33* 33*   CREATININE mg/dL 1.62* 2.04*   CALCIUM mg/dL 8.9 11.3*   BILIRUBIN mg/dL 0.5 0.6   ALK PHOS U/L 86 143*   ALT (SGPT) U/L 15 18   AST (SGOT) U/L 20 30   GLUCOSE mg/dL 122* 153*       Imaging:  CT images reviewed, which show inflammation of the splenic flexure and descending colon concerning for colitis.  No free air or fluid.    Assessment and plan:   The patient is a 77 y.o. female with colitis, possible ischemic colitis versus diverticulitis.  -Recommend bowel rest, okay for clears  -ok to discontinue zosyn, as her story is very consistent with events leading to ischemic episode  -will follow abdominal exam closely      Janeth Smith MD  General, Robotic, and Endoscopic Surgery  [facility]     [address], [address]  [place], [place], [address]  P: [phone]  F: [phone]

## 2022-11-14 NOTE — PROGRESS NOTES
" LOS: 0 days     Name: Meme Morales  Age: 77 y.o.  Sex: female  :  1945  MRN: [id number]         Primary Care Physician: Elizabeth Dawkins APRN    Subjective   Subjective  Patient reports improvement of her generalized abdominal pain.  No nausea or vomiting.  Had a couple bloody bowel movements in the emergency room but none since admission up on the floor.    Objective   Vital Signs  Temp:  [97.7 °F (36.5 °C)-98.9 °F (37.2 °C)] 98.9 °F (37.2 °C)  Heart Rate:  [59-80] 60  Resp:  [18] 18  BP: (102-147)/(51-85) 139/53  Body mass index is 26.81 kg/m².    Objective:  General Appearance:  Comfortable, in no acute distress and ill-appearing.    Vital signs: (most recent): Blood pressure 139/53, pulse 60, temperature 98.9 °F (37.2 °C), temperature source Oral, resp. rate 18, height 168.9 cm (66.5\"), weight 76.5 kg (168 lb 9.6 oz), SpO2 94 %.    Lungs:  Normal effort and normal respiratory rate.  She is not in respiratory distress.  There are decreased breath sounds.    Heart: Normal rate.  Regular rhythm.    Abdomen: Abdomen is soft.  Bowel sounds are normal.   (Minimal abdominal tenderness to palpation).     Extremities: There is no dependent edema or local swelling.    Neurological: Patient is alert and oriented to person, place and time.    Skin:  Warm and dry.              Results Review:       I reviewed the patient's new clinical results.    Results from last 7 days   Lab Units 22  0440 22  1525   WBC 10*3/mm3 13.34* 18.85*   HEMOGLOBIN g/dL 10.2* 14.6   PLATELETS 10*3/mm3 165 294     Results from last 7 days   Lab Units 22  0440 22  1525   SODIUM mmol/L 140 136   POTASSIUM mmol/L 4.6 5.2   CHLORIDE mmol/L 106 99   CO2 mmol/L 21.8* 20.8*   BUN mg/dL 33* 33*   CREATININE mg/dL 1.62* 2.04*   CALCIUM mg/dL 8.9 11.3*   GLUCOSE mg/dL 122* 153*                 Scheduled Meds:   ascorbic acid, 1,000 mg, Oral, Nightly  aspirin EC, 325 mg, Oral, Daily  atorvastatin, 40 mg, Oral, " Daily  cholecalciferol, 2,000 Units, Oral, Daily  levothyroxine, 75 mcg, Oral, Q AM  montelukast, 10 mg, Oral, Nightly  piperacillin-tazobactam, 3.375 g, Intravenous, Q8H  rOPINIRole, 1 mg, Oral, TID  sodium chloride, 10 mL, Intravenous, Q12H      PRN Meds:   •  acetaminophen  •  nitroglycerin  •  ondansetron  •  sodium chloride  •  sodium chloride  Continuous Infusions:  sodium chloride, 100 mL/hr, Last Rate: 100 mL/hr (11/13/22 2315)        Assessment & Plan   Active Hospital Problems    Diagnosis  POA   • **Colitis [K52.9]  Unknown   • Abdominal pain, unspecified abdominal location [R10.9]  Yes   • MACY (acute kidney injury) ([facility]) [N17.9]  Unknown   • Restless legs syndrome (RLS) [G25.81]  Yes   • Acquired hypothyroidism [E03.9]  Yes   • CKD (chronic kidney disease) stage 3, GFR 30-59 ml/min ([facility]) [N18.30]  Yes   • Presence of cardiac pacemaker [Z95.0]  Yes   • Cerebral aneurysm, nonruptured [I67.1]  Yes   • Essential hypertension [I10]  Yes   • Sick sinus syndrome due to SA node dysfunction ([facility]) [I49.5]  Yes      Resolved Hospital Problems   No resolved problems to display.       Assessment & Plan    -Continue Zosyn.  Leukocytosis improving.  General surgery has been consulted.  -Renal function improving.  Creatinine today is 1.6.  Baseline appears to be 1.0  -Hypercalcemia resolved  -Continue IV fluids  -Blood cultures pending.  Stool studies negative in the emergency room.        Richard Ware MD  [facility]  11/14/22  09:31 EST

## 2022-11-15 VITALS
DIASTOLIC BLOOD PRESSURE: 69 MMHG | HEIGHT: 67 IN | RESPIRATION RATE: 20 BRPM | WEIGHT: 168.6 LBS | BODY MASS INDEX: 26.46 KG/M2 | HEART RATE: 60 BPM | SYSTOLIC BLOOD PRESSURE: 139 MMHG | OXYGEN SATURATION: 97 % | TEMPERATURE: 97.7 F

## 2022-11-15 LAB
ANION GAP SERPL CALCULATED.3IONS-SCNC: 6.7 MMOL/L (ref 5–15)
BUN SERPL-MCNC: 20 MG/DL (ref 8–23)
BUN/CREAT SERPL: 21.5 (ref 7–25)
CALCIUM SPEC-SCNC: 8.8 MG/DL (ref 8.6–10.5)
CHLORIDE SERPL-SCNC: 109 MMOL/L (ref 98–107)
CO2 SERPL-SCNC: 25.3 MMOL/L (ref 22–29)
CREAT SERPL-MCNC: 0.93 MG/DL (ref 0.57–1)
DEPRECATED RDW RBC AUTO: 40.9 FL (ref 37–54)
EGFRCR SERPLBLD CKD-EPI 2021: 62.6 ML/MIN/1.73
ERYTHROCYTE [DISTWIDTH] IN BLOOD BY AUTOMATED COUNT: 12.6 % (ref 12.3–15.4)
GLUCOSE SERPL-MCNC: 85 MG/DL (ref 65–99)
HCT VFR BLD AUTO: 29.2 % (ref 34–46.6)
HGB BLD-MCNC: 9.7 G/DL (ref 12–15.9)
MCH RBC QN AUTO: 29.7 PG (ref 26.6–33)
MCHC RBC AUTO-ENTMCNC: 33.2 G/DL (ref 31.5–35.7)
MCV RBC AUTO: 89.3 FL (ref 79–97)
PLATELET # BLD AUTO: 181 10*3/MM3 (ref 140–450)
PMV BLD AUTO: 10.7 FL (ref 6–12)
POTASSIUM SERPL-SCNC: 4.6 MMOL/L (ref 3.5–5.2)
RBC # BLD AUTO: 3.27 10*6/MM3 (ref 3.77–5.28)
SODIUM SERPL-SCNC: 141 MMOL/L (ref 136–145)
WBC NRBC COR # BLD: 10.19 10*3/MM3 (ref 3.4–10.8)

## 2022-11-15 PROCEDURE — 96361 HYDRATE IV INFUSION ADD-ON: CPT

## 2022-11-15 PROCEDURE — G0378 HOSPITAL OBSERVATION PER HR: HCPCS

## 2022-11-15 PROCEDURE — 85027 COMPLETE CBC AUTOMATED: CPT | Performed by: INTERNAL MEDICINE

## 2022-11-15 PROCEDURE — 80048 BASIC METABOLIC PNL TOTAL CA: CPT | Performed by: INTERNAL MEDICINE

## 2022-11-15 PROCEDURE — 99212 OFFICE O/P EST SF 10 MIN: CPT | Performed by: SURGERY

## 2022-11-15 RX ORDER — POLYETHYLENE GLYCOL 3350 17 G/17G
17 POWDER, FOR SOLUTION ORAL DAILY PRN
Status: DISCONTINUED | OUTPATIENT
Start: 2022-11-15 | End: 2022-11-15 | Stop reason: HOSPADM

## 2022-11-15 RX ADMIN — Medication 10 ML: at 08:11

## 2022-11-15 RX ADMIN — Medication 2000 UNITS: at 08:09

## 2022-11-15 RX ADMIN — ASPIRIN 325 MG: 325 TABLET, COATED ORAL at 08:09

## 2022-11-15 RX ADMIN — LEVOTHYROXINE SODIUM 75 MCG: 0.07 TABLET ORAL at 05:55

## 2022-11-15 RX ADMIN — SODIUM CHLORIDE 100 ML/HR: 9 INJECTION, SOLUTION INTRAVENOUS at 00:31

## 2022-11-15 RX ADMIN — ROPINIROLE 1 MG: 1 TABLET, FILM COATED ORAL at 00:31

## 2022-11-15 RX ADMIN — ATORVASTATIN CALCIUM 40 MG: 20 TABLET, FILM COATED ORAL at 08:09

## 2022-11-15 NOTE — PROGRESS NOTES
"General Surgery Progress Note    S: Doing well, denies abdominal pain.  Tolerated clear liquid diet last night and this morning.  Passing gas, no bowel movement overnight.  No nausea or vomiting.    O:/69 (BP Location: Right arm, Patient Position: Sitting)   Pulse 60   Temp 97.7 °F (36.5 °C) (Oral)   Resp 20   Ht 168.9 cm (66.5\")   Wt 76.5 kg (168 lb 9.6 oz)   SpO2 97%   BMI 26.81 kg/m²     Intake & Output (last day)       11/14 0701  11/15 0700 11/15 0701  11/16 0700    P.O.      I.V. (mL/kg)      IV Piggyback      Total Intake(mL/kg)      Net            Urine Unmeasured Occurrence 2 x           GENERAL: awake, alert, no apparent distress  HEENT: normochephalic, atraumatic, moist mucus membranes, clear sclera   CHEST: clear to auscultation, no wheezes, no increased work of breathing  CARDIAC: regular rate and rhythm    ABDOMEN: Soft, nondistended, minimal tenderness  EXTREMITIES: no cyanosis, clubbing or edema    SKIN: Warm and moist, no rashes    LABS  Results from last 7 days   Lab Units 11/15/22  0442 11/14/22  0440 11/13/22  1525   WBC 10*3/mm3 10.19 13.34* 18.85*   HEMOGLOBIN g/dL 9.7* 10.2* 14.6   HEMATOCRIT % 29.2* 30.7* 43.2   PLATELETS 10*3/mm3 181 165 294     Results from last 7 days   Lab Units 11/15/22  0442 11/14/22  0440 11/13/22  1525   SODIUM mmol/L 141 140 136   POTASSIUM mmol/L 4.6 4.6 5.2   CHLORIDE mmol/L 109* 106 99   CO2 mmol/L 25.3 21.8* 20.8*   BUN mg/dL 20 33* 33*   CREATININE mg/dL 0.93 1.62* 2.04*   CALCIUM mg/dL 8.8 8.9 11.3*   BILIRUBIN mg/dL  --  0.5 0.6   ALK PHOS U/L  --  86 143*   ALT (SGPT) U/L  --  15 18   AST (SGOT) U/L  --  20 30   GLUCOSE mg/dL 85 122* 153*             A/P: 77 y.o. female with possible ischemic colitis after profound hypotension.  -Advance to full liquid diet today.  If tolerates this can advance to regular this afternoon.  -From a surgical standpoint, she is appropriate for discharge today assuming she tolerates her diet.  -I recommended that she " get a colonoscopy in the coming months.  She said she has an EGD scheduled with her gastroenterologist and will see about getting a colonoscopy at the same time.    Please call me with any questions or concerns.    MARY ANN DAVIS MD  General, Robotic and Endoscopic Surgery  [facility]    [address], [address]  [place], [place], [address]  P: [phone]  F: [phone]

## 2022-11-15 NOTE — DISCHARGE SUMMARY
Date of Admission: 11/13/2022  Date of Discharge:  11/15/2022  Primary Care Physician: Elizabeth Dawkins, LINWOOD     Discharge Diagnosis:  Active Hospital Problems    Diagnosis  POA   • **Colitis [K52.9]  Unknown   • Abdominal pain, unspecified abdominal location [R10.9]  Yes   • MACY (acute kidney injury) (HCC) [N17.9]  Unknown   • Restless legs syndrome (RLS) [G25.81]  Yes   • Acquired hypothyroidism [E03.9]  Yes   • CKD (chronic kidney disease) stage 3, GFR 30-59 ml/min (HCC) [N18.30]  Yes   • Presence of cardiac pacemaker [Z95.0]  Yes   • Cerebral aneurysm, nonruptured [I67.1]  Yes   • Essential hypertension [I10]  Yes   • Sick sinus syndrome due to SA node dysfunction (HCC) [I49.5]  Yes      Resolved Hospital Problems   No resolved problems to display.       DETAILS OF HOSPITAL STAY     Pertinent Test Results and Procedures Performed    CT scan of the abdomen and pelvis:  1.  Long segment of colitis involving the splenic flexure through the   distal descending/proximal sigmoid colon. The location and overall   appearance is suggestive of ischemic colitis with diverticulitis other   primary differential consideration. Infectious colitis is also possible   but considered less likely. Please note evaluation is suboptimal without   intravenous contrast. Gastroenterology consultation is recommended.   2.  But are nephrolithiasis without hydronephrosis. Other findings as   above     Hospital Course  This is a 77-year-old female who presented to the emergency room with generalized abdominal pain and was found to have colitis on CT scan of the abdomen and pelvis.  Please see H&P for full details.  She was initially placed on Zosyn for possibility of infectious colitis.  General surgery was consulted and felt this was most consistent with a ischemic colitis.  She was taken off of antibiotics.  Provided supportive measures and with this her symptoms have greatly resolved.  Diet will be advanced today and if  tolerates she will be discharged home now that cleared by general surgery.  She is not having any blood in her stool.  She has an upcoming EGD with her outpatient gastroenterologist and we have encouraged her to get back in with him so that she can have a colonoscopy at the same time.  She expresses understanding and agreement with this plan.  She also had an acute kidney injury upon presentation that resolved with IV fluids with renal function now back to baseline.  She will be released home today in stable condition.    Physical Exam at Discharge:  General: No acute distress, AAOx3  HEENT: EOMI, PERRL  Cardiovascular: +s1 and s2, RRR  Lungs: No rhonchi or wheezing  Abdomen: soft, nontender    Consults:   Consults     Date and Time Order Name Status Description    11/13/2022  5:52 PM Surgery (on-call MD unless specified) Completed     11/13/2022  5:52 PM [facility] (on-call MD unless specified) Details              Condition on Discharge: Stable    Discharge Disposition  Home or Self Care    Discharge Medications     Discharge Medications      Continue These Medications      Instructions Start Date   Acetaminophen 500 MG capsule   Every 24 Hours      ascorbic acid 1000 MG tablet  Commonly known as: VITAMIN C   Every 24 Hours      aspirin  MG tablet   325 mg, Oral, Daily      atorvastatin 40 MG tablet  Commonly known as: LIPITOR   40 mg, Oral, Daily      azelastine 0.15 % solution nasal spray  Commonly known as: ASTEPRO   2 sprays, Nasal, 2 Times Daily, as directed      flunisolide 25 MCG/ACT (0.025%) solution nasal spray  Commonly known as: NASALIDE   USE 2 SPRAYS NASALLY EVERY 12 HOURS      furosemide 40 MG tablet  Commonly known as: Lasix   40 mg, Oral, Daily      levothyroxine 75 MCG tablet  Commonly known as: SYNTHROID, LEVOTHROID   75 mcg, Oral, Daily      lisinopril-hydrochlorothiazide 10-12.5 MG per tablet  Commonly known as: PRINZIDE,ZESTORETIC   No dose, route, or frequency recorded.      montelukast 10 MG  tablet  Commonly known as: SINGULAIR   10 mg, Oral, Every 24 Hours      pantoprazole 40 MG EC tablet  Commonly known as: PROTONIX   40 mg, Oral, Daily      polycarbophil 625 MG tablet tablet   Every 6 Hours Scheduled      rOPINIRole 1 MG tablet  Commonly known as: REQUIP   1 mg, Oral, 3 Times Daily      Vitamin D3 50 MCG (2000 UT) tablet   2,000 Units, Oral, Daily             Discharge Diet:   Diet Instructions     Diet: Regular      Discharge Diet: Regular          Activity at Discharge:     Follow-up Appointments  Future Appointments   Date Time Provider Department Center   12/6/2022  9:30 AM Angie Mcmahon APRN [facility]   9/11/2023  2:00 PM Pritesh Carmona MD [facility]     Additional Instructions for the Follow-ups that You Need to Schedule     Discharge Follow-up with PCP   As directed       Currently Documented PCP:    Elizabeth Dawkins APRN    PCP Phone Number:    [phone]     Follow Up Details: 1 week         Discharge Follow-up with Specified Provider: Dr. Ty of GI for EGD and colonoscopy   As directed      To: Dr. Ty of GI for EGD and colonoscopy               Test Results Pending at Discharge  Pending Labs     Order Current Status    Blood Culture - Blood, Hand, Right Preliminary result    Blood Culture - Blood, Hand, Right Preliminary result          I have examined and discussed discharge planning with the patient today.    I wore full protective equipment throughout the patient encounter including eye protection and facemask.  Hand hygiene was performed before donning protective equipment and after removal when leaving the room.     Richard Ware MD  11/15/22  10:12 EST    Time: Discharge 22 min

## 2022-11-15 NOTE — PLAN OF CARE
"Goal Outcome Evaluation:  Plan of Care Reviewed With: patient           Pt A/O x4. On room air. Up ad ha with stand by assist. Pt c/o of generalized abd discomfort but states she is \"feeling much better\". NS infusing @100ml/hr. Sister at bedside. Safety maintained.  "
Goal Outcome Evaluation:  Plan of Care Reviewed With: patient            No acute distress noted this shift,safety maintained, continue plan of care  
Goal Outcome Evaluation:  Plan of Care Reviewed With: patient        Progress: no change  Outcome Evaluation: Pt fro ER, no BM since admit, VSS, started on IVF, IV Zosyn as ordered, Tylenol given for headache, pt rated abd pain 5 did not want prn med due to allergies, NPO except meds and ice sparingly, up to BSC with standby asst, sister at BS  
Detail Level: Detailed
Products Recommended: Recommend Elta MD Sport SPF 50 sunblock
General Sunscreen Counseling: I recommended a broad spectrum sunscreen with a SPF of 50 or higher.  I explained that SPF 50 sunscreens block approximately 97 percent of the sun's harmful rays.  Sunscreens should be applied at least 15 minutes prior to expected sun exposure and then every 2 hours after that as long as sun exposure continues. If swimming or exercising sunscreen should be reapplied every 45 minutes to an hour after getting wet or sweating.  One ounce, or the equivalent of a shot glass full of sunscreen, is adequate to protect the skin not covered by a bathing suit. I also recommended a lip balm with a sunscreen as well. Sun protective clothing can be used in lieu of sunscreen but must be worn the entire time you are exposed to the sun's rays.

## 2022-11-15 NOTE — CASE MANAGEMENT/SOCIAL WORK
Case Management Discharge Note      Final Note: home    Provided Post Acute Provider List?: Refused (Pt said she does not anticipate the need for HH / SNF)    Selected Continued Care - Discharged on 11/15/2022 Admission date: 11/13/2022 - Discharge disposition: Home or Self Care    Destination    No services have been selected for the patient.              Durable Medical Equipment    No services have been selected for the patient.              Dialysis/Infusion    No services have been selected for the patient.              Home Medical Care    No services have been selected for the patient.              Therapy    No services have been selected for the patient.              Community Resources    No services have been selected for the patient.              Community & DME    No services have been selected for the patient.                  Transportation Services  Private: Car    Final Discharge Disposition Code: 01 - home or self-care

## 2022-11-15 NOTE — CASE MANAGEMENT/SOCIAL WORK
Discharge Planning Assessment  [facility]     Patient Name: Meme Morales  MRN: [id number]  Today's Date: 11/15/2022    Admit Date: 11/13/2022    Plan: Home w/o needs   Discharge Needs Assessment     Row Name 11/15/22 1031       Living Environment    People in Home spouse    Name(s) of People in Home spouse: Richard    Current Living Arrangements home    Primary Care Provided by self    Provides Primary Care For spouse    Family Caregiver if Needed sibling(s)    Family Caregiver Names sister: Lisy    Quality of Family Relationships supportive    Able to Return to Prior Arrangements yes               Discharge Plan     Row Name 11/15/22 1027       Plan    Plan Home w/o needs    Patient/Family in Agreement with Plan yes    Provided Post Acute Provider List? 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  Pt said she can afford her Rx, uses no DME, has never had home health or been to a SNF. Pt said she and her  live in a multi-level home with everything but the laundry on the main level.  Pt said she is discharging today and plans to return home, she said her sister will give her a ride home.  Pt denies discharge needs or concerns.  Pt said she will provide the hospital with a copy of her Living Will.       Laura Desir RN              Continued Care and Services - Admitted Since 11/13/2022    Coordination has not been started for this encounter.       Expected Discharge Date and Time     Expected Discharge Date Expected Discharge Time    Nov 15, 2022          Demographic Summary     Row Name 11/15/22 1031       General Information    Admission Type observation    Arrived From home    Required Notices Provided Observation Status Notice    Referral Source admission list    Reason for Consult discharge planning    Preferred Language English                Functional Status     Row Name 11/15/22 1031       Functional Status, IADL    Medications independent    Meal Preparation independent    Housekeeping independent    Laundry independent    Shopping independent                Laura Desir RN

## 2022-11-18 LAB
BACTERIA SPEC AEROBE CULT: NORMAL
BACTERIA SPEC AEROBE CULT: NORMAL

## 2022-11-18 PROCEDURE — 93294 REM INTERROG EVL PM/LDLS PM: CPT | Performed by: INTERNAL MEDICINE

## 2022-11-18 PROCEDURE — 93296 REM INTERROG EVL PM/IDS: CPT | Performed by: INTERNAL MEDICINE

## 2022-11-28 ENCOUNTER — OFFICE VISIT (OUTPATIENT)
Dept: FAMILY MEDICINE CLINIC | Facility: CLINIC | Age: 77
End: 2022-11-28

## 2022-11-28 VITALS
TEMPERATURE: 97.5 F | BODY MASS INDEX: 26.37 KG/M2 | HEART RATE: 69 BPM | SYSTOLIC BLOOD PRESSURE: 130 MMHG | WEIGHT: 168 LBS | HEIGHT: 67 IN | DIASTOLIC BLOOD PRESSURE: 64 MMHG | OXYGEN SATURATION: 100 % | RESPIRATION RATE: 16 BRPM

## 2022-11-28 DIAGNOSIS — N17.9 ACUTE KIDNEY INJURY: ICD-10-CM

## 2022-11-28 DIAGNOSIS — D64.9 ANEMIA, UNSPECIFIED TYPE: Primary | ICD-10-CM

## 2022-11-28 DIAGNOSIS — Z09 HOSPITAL DISCHARGE FOLLOW-UP: ICD-10-CM

## 2022-11-28 DIAGNOSIS — K52.9 COLITIS: ICD-10-CM

## 2022-11-28 PROCEDURE — 99214 OFFICE O/P EST MOD 30 MIN: CPT | Performed by: NURSE PRACTITIONER

## 2022-11-29 LAB
BASOPHILS # BLD AUTO: 0.08 10*3/MM3 (ref 0–0.2)
BASOPHILS NFR BLD AUTO: 1 % (ref 0–1.5)
EOSINOPHIL # BLD AUTO: 0.59 10*3/MM3 (ref 0–0.4)
EOSINOPHIL NFR BLD AUTO: 7.6 % (ref 0.3–6.2)
ERYTHROCYTE [DISTWIDTH] IN BLOOD BY AUTOMATED COUNT: 12.9 % (ref 12.3–15.4)
HCT VFR BLD AUTO: 32.6 % (ref 34–46.6)
HGB BLD-MCNC: 10.7 G/DL (ref 12–15.9)
IMM GRANULOCYTES # BLD AUTO: 0.02 10*3/MM3 (ref 0–0.05)
IMM GRANULOCYTES NFR BLD AUTO: 0.3 % (ref 0–0.5)
LYMPHOCYTES # BLD AUTO: 1.43 10*3/MM3 (ref 0.7–3.1)
LYMPHOCYTES NFR BLD AUTO: 18.3 % (ref 19.6–45.3)
MCH RBC QN AUTO: 30.6 PG (ref 26.6–33)
MCHC RBC AUTO-ENTMCNC: 32.8 G/DL (ref 31.5–35.7)
MCV RBC AUTO: 93.1 FL (ref 79–97)
MONOCYTES # BLD AUTO: 0.4 10*3/MM3 (ref 0.1–0.9)
MONOCYTES NFR BLD AUTO: 5.1 % (ref 5–12)
NEUTROPHILS # BLD AUTO: 5.29 10*3/MM3 (ref 1.7–7)
NEUTROPHILS NFR BLD AUTO: 67.7 % (ref 42.7–76)
NRBC BLD AUTO-RTO: 0 /100 WBC (ref 0–0.2)
PLATELET # BLD AUTO: 262 10*3/MM3 (ref 140–450)
RBC # BLD AUTO: 3.5 10*6/MM3 (ref 3.77–5.28)
WBC # BLD AUTO: 7.81 10*3/MM3 (ref 3.4–10.8)

## 2022-12-01 NOTE — PROGRESS NOTES
Subjective   Meme Morales is a 77 y.o. female.     History of Present Illness   Meme Morales 77 y.o. female who presents today for a new patient appointment.    she has a history of   Patient Active Problem List   Diagnosis   • Essential hypertension   • Sick sinus syndrome due to SA node dysfunction (HCC)   • Cerebral aneurysm, nonruptured   • Other cerebrovascular disease   • CKD (chronic kidney disease) stage 3, GFR 30-59 ml/min (HCC)   • Tricuspid regurgitation   • Mitral valve regurgitation   • Presence of cardiac pacemaker   • Right carotid bruit   • Thoracic outlet syndrome associated with cervical rib   • Mixed hyperlipidemia   • Hyperparathyroidism (HCC)   • Thyroid nodule   • Chronic allergic rhinitis due to pollen   • Acquired hypothyroidism   • Gastroesophageal reflux disease without esophagitis   • Dependent edema   • Valvular heart disease   • Restless legs syndrome (RLS)   • Arthritis of carpometacarpal (CMC) joint of both thumbs   • Atrial tachycardia (HCC)   • Dyspnea on exertion   • Abdominal pain, unspecified abdominal location   • Colitis   • MACY (acute kidney injury) (HCC)   .  she is here to establish care and hospital follow up  I reviewed the PFSH recorded today by my MA/LPN staff.   she   She has been feeling fairly well.  Meme Morales 77 y.o. female presents today for hosptial follow up.  she was treated [facility] for colitis .  I reviewed all of the labs and diagnostic testing.    Current outpatient and discharge medications have been reconciled for the patient.  Reviewed by: LINWOOD Macedo    she does have a follow up appointment with a specialist:     Hospital note as follows:  Date of Admission: 11/13/2022  Date of Discharge:  11/15/2022  Primary Care Physician: Elizabeth Dawkins APRN      Discharge Diagnosis:        Active Hospital Problems     Diagnosis   POA   • **Colitis [K52.9]   Unknown   • Abdominal pain, unspecified abdominal location [R10.9]   Yes    • MACY (acute kidney injury) ([facility]) [N17.9]   Unknown   • Restless legs syndrome (RLS) [G25.81]   Yes   • Acquired hypothyroidism [E03.9]   Yes   • CKD (chronic kidney disease) stage 3, GFR 30-59 ml/min ([facility]) [N18.30]   Yes   • Presence of cardiac pacemaker [Z95.0]   Yes   • Cerebral aneurysm, nonruptured [I67.1]   Yes   • Essential hypertension [I10]   Yes   • Sick sinus syndrome due to SA node dysfunction ([facility]) [I49.5]   Yes       Resolved Hospital Problems   No resolved problems to display.         DETAILS OF HOSPITAL STAY      Pertinent Test Results and Procedures Performed     CT scan of the abdomen and pelvis:  1.  Long segment of colitis involving the splenic flexure through the   distal descending/proximal sigmoid colon. The location and overall   appearance is suggestive of ischemic colitis with diverticulitis other   primary differential consideration. Infectious colitis is also possible   but considered less likely. Please note evaluation is suboptimal without   intravenous contrast. Gastroenterology consultation is recommended.   2.  But are nephrolithiasis without hydronephrosis. Other findings as   above      Hospital Course  This is a 77-year-old female who presented to the emergency room with generalized abdominal pain and was found to have colitis on CT scan of the abdomen and pelvis.  Please see H&P for full details.  She was initially placed on Zosyn for possibility of infectious colitis.  General surgery was consulted and felt this was most consistent with a ischemic colitis.  She was taken off of antibiotics.  Provided supportive measures and with this her symptoms have greatly resolved.  Diet will be advanced today and if tolerates she will be discharged home now that cleared by general surgery.  She is not having any blood in her stool.  She has an upcoming EGD with her outpatient gastroenterologist and we have encouraged her to get back in with him so that she can have a colonoscopy at the  same time.  She expresses understanding and agreement with this plan.  She also had an acute kidney injury upon presentation that resolved with IV fluids with renal function now back to baseline.  She will be released home today in stable condition.    She reports feeling improved since hospital discharge. She denies any abdominal pain or diarrhea.  She has not had any blood in the stool.  She has f/u appt scheduled with gastroenterology and has cardiology appt next week. She had some anemia in hospital and needs CBC rechecked. Remainder of labs ok. She sees nephrology for chronic kidney disease but had MACY during hospital stay which resolved.  Her creatinine and GFR were normal upon discharge.     Reports issues with BP fluctuating at home. She reduced her lisinopril/hctz to half tablet on her own due to low readings but that was prior to her hospital visit and may have been related to some dehydration.  Her BP has been more elevated at home than today's reading.  She was on amlodipine in the past and did well but states her nephrologist changed her as he felt this would work better for her BP.  She also sees cardiology and will discuss BP with them at next appt.   The following portions of the patient's history were reviewed and updated as appropriate: allergies, current medications, past family history, past medical history, past social history, past surgical history and problem list.    Review of Systems   Constitutional: Negative for chills, fever and unexpected weight change.   Respiratory: Negative for shortness of breath.    Cardiovascular: Negative for chest pain and palpitations.   Gastrointestinal: Negative for abdominal distention, abdominal pain, anal bleeding, blood in stool, constipation, diarrhea, nausea, rectal pain and vomiting.   Psychiatric/Behavioral: Negative for behavioral problems.       Objective   Physical Exam  Vitals and nursing note reviewed.   Constitutional:       Appearance: She is  well-developed.   Cardiovascular:      Rate and Rhythm: Normal rate and regular rhythm.   Pulmonary:      Effort: Pulmonary effort is normal.      Breath sounds: Normal breath sounds.   Neurological:      Mental Status: She is alert and oriented to person, place, and time.   Psychiatric:         Mood and Affect: Mood normal.         Behavior: Behavior normal.         Thought Content: Thought content normal.         Judgment: Judgment normal.         Assessment & Plan   Diagnoses and all orders for this visit:    1. Anemia, unspecified type (Primary)  -     CBC & Differential    2. Colitis    3. Hospital discharge follow-up    4. Acute kidney injury (HCC)  Comments:  resolved         Hospital records reviewed with pt confirming HPI.    Current outpatient and discharge medications have been reconciled for the patient.  Reviewed by: LINWOOD Macedo    She will check BP at home BID and f/u in 1 week with readings.

## 2022-12-05 NOTE — PROGRESS NOTES
Subjective:     Encounter Date:12/06/2022      Patient ID: Meme Morales is a 77 y.o. female.    Chief Complaint:follow up HTN, SSS  History of Present Illness  This is a 78 y/o female who previously followed with Dr. Conner and is new to me today. She has a pmhx of hypertension, hyperlipidemia, sick sinus syndrome s/p [employer] PPM, cerebral aneurysm s/p coiling, chronic kidney disease, mild mitral and moderate tricuspid regurgitation. She also has thoracic outlet syndrome secondary to cervical rib.     She was last seen in the office by Dr. Conner in June and was not doing well. Dr. Jain had changed her amlodipine to lisinopril/HCTZ and she was having episodes of SBP in the 70s. She had started taking only half a tablet prior to being seen in the office. This seemed to help so she was instructed to continue to monitor her pressures. She also had complaints of shortness of breath so an echocardiogram and nuclear stress test were ordered. Echocardiogram showed an Ef of 62%, grade 1 diastolic dysfunction, mild mitral regurgitation, moderate tricuspid regurgitation and mildly elevated RVSP. Nuclear stress test showed a small-sized, mild to moderately severe area of ischemia in the apex, hyperdynamic EF > 70%, and considered a low risk study.    She is here today for a follow up visit. Since last seen, she was hospitalized for a couple of days in November for colitis.  She has been doing ok since seeing Dr. Conner in June. She denies any chest pain or palpitations. She has some shortness of breath that is at her baseline. She has swelling in her lower extremities that is at her baseline and she wears compression socks. She denies any syncope. Her biggest concern today is her blood pressure. For the most part, her systolic blood pressure remains in the 140s-150s. When she was taking a full tablet of lisinopril/HCTZ, she was having problems with hypotension. Now she is taking 1/2 tablet. Her  blood pressure does appear to be a little better in the evenings. Her sister, who is a retired nurse, has accompanied her today. She tells me that Ms. Morales is excellent at monitoring her pressure and checking her weight daily. She would like to split the lisinopril and HCTZ rather than take the combo pill.    I have reviewed and updated as appropriate allergies, current medications, past family history, past medical history, past surgical history and problem list.    Review of Systems   Constitutional: Negative for fever, malaise/fatigue, weight gain and weight loss.   HENT: Negative for congestion, hoarse voice and sore throat.    Eyes: Negative for blurred vision and double vision.   Cardiovascular: Positive for dyspnea on exertion and leg swelling. Negative for chest pain, orthopnea, palpitations and syncope.   Respiratory: Negative for cough and wheezing.    Gastrointestinal: Negative for abdominal pain, hematemesis, hematochezia and melena.   Genitourinary: Negative for dysuria and hematuria.   Neurological: Negative for dizziness, headaches, light-headedness and numbness.   Psychiatric/Behavioral: Negative for depression. The patient is not nervous/anxious.          Current Outpatient Medications:   •  Acetaminophen 500 MG capsule, Daily., Disp: , Rfl:   •  ascorbic acid (VITAMIN C) 1000 MG tablet, Daily., Disp: , Rfl:   •  aspirin  MG tablet, Take 1 tablet by mouth Daily., Disp: 30 tablet, Rfl: 6  •  atorvastatin (LIPITOR) 40 MG tablet, TAKE 1 TABLET BY MOUTH  DAILY, Disp: 90 tablet, Rfl: 3  •  azelastine (ASTEPRO) 0.15 % solution nasal spray, 2 sprays into the nostril(s) as directed by provider 2 (Two) Times a Day. as directed, Disp: 120 mL, Rfl: 1  •  Cholecalciferol (VITAMIN D3) 2000 units tablet, Take 2,000 Units by mouth Daily., Disp: , Rfl:   •  Cranberry 1000 MG capsule, Take  by mouth., Disp: , Rfl:   •  flunisolide (NASALIDE) 25 MCG/ACT (0.025%) solution nasal spray, USE 2 SPRAYS NASALLY  EVERY 12 HOURS, Disp: 100 mL, Rfl: 1  •  furosemide (Lasix) 40 MG tablet, Take 1 tablet by mouth Daily., Disp: 90 tablet, Rfl: 1  •  levothyroxine (SYNTHROID, LEVOTHROID) 75 MCG tablet, TAKE 1 TABLET BY MOUTH  DAILY, Disp: 90 tablet, Rfl: 3  •  montelukast (SINGULAIR) 10 MG tablet, TAKE 1 TABLET BY MOUTH  DAILY, Disp: 90 tablet, Rfl: 3  •  pantoprazole (PROTONIX) 40 MG EC tablet, TAKE 1 TABLET BY MOUTH  DAILY, Disp: 90 tablet, Rfl: 3  •  polycarbophil (calcium polycarbophil) 625 MG tablet tablet, Every 6 (Six) Hours., Disp: , Rfl:   •  rOPINIRole (REQUIP) 1 MG tablet, Take 1 tablet by mouth 3 (Three) Times a Day., Disp: 270 tablet, Rfl: 1  •  Turmeric Curcumin 500 MG capsule, Take  by mouth., Disp: , Rfl:   •  hydroCHLOROthiazide (MICROZIDE) 12.5 MG capsule, Take 1 capsule by mouth Daily., Disp: 90 capsule, Rfl: 3  •  lisinopril (PRINIVIL,ZESTRIL) 10 MG tablet, Take 1 tablet by mouth Daily., Disp: 90 tablet, Rfl: 3  •  polycarbophil 625 MG tablet tablet, Take 625 mg by mouth Daily., Disp: , Rfl:     Past Medical History:   Diagnosis Date   • Acid reflux    • Arthritis    • Arthritis of carpometacarpal (CMC) joint of right thumb    • Francisco esophagus    • Basal cell carcinoma    • Brain aneurysm    • Burning with urination    • Cancer (HCC)     non malignant skin cancers   • Cerebral aneurysm    • Congestive heart failure (HCC)    • Depression    • Difficulty swallowing    • Disease of thyroid gland     HISTORY OF NODULES, SURGERY.    • Encounter for Medicare annual wellness exam    • Esophageal reflux    • Exposure to chickenpox    • H/O TB skin testing     WITH POSITIVE RESULT. NEG CXR.   • Heart murmur    • High blood pressure    • History of hyperparathyroidism    • History of migraine headaches    • Hoarseness    • Hypercalcemia    • Hyperlipidemia    • Hypertension    • Hypothyroidism    • Leaky heart valve    • Low back pain    • Osteopenia with high risk of fracture    • Overweight with body mass index (BMI)  of 25 to 25.9 in adult    • Pacemaker     IMPLANTED LEFT SIDE   • Palpitations    • Pseudocholinesterase deficiency     AUNT ON FATHER'S SIDE OF FAMILY   • Pulmonary hypertension (HCC)    • Rheumatic multiple valve disease    • RLS (restless legs syndrome)    • Rubella    • Seasonal allergies    • Sick sinus syndrome due to SA node dysfunction (HCC)     REASON FOR PACEMAKER   • Skin disorder    • Slow to wake up after anesthesia    • Stage 3 chronic kidney disease (HCC)    • Symptoms of urinary tract infection    • Thoracic outlet syndrome    • Thyroid disease    • Tinea cruris    • Tricuspid valve regurgitation    • Urinary hesitancy    • Valvular heart disease    • Vitamin D deficiency        Past Surgical History:   Procedure Laterality Date   • CARDIAC CATHETERIZATION      Post-Proc Data:  Lesions successfully dilated    • CATARACT EXTRACTION WITH INTRAOCULAR LENS IMPLANT      LEFT AND RIGHT   • CEREBRAL ANGIOGRAM N/A 6/26/2019    Procedure: CEREBRAL ANGIOGRAM;  Surgeon: Ranjit Key MD;  Location: [facility] OR 18/19;  Service: Neurosurgery   • COLONOSCOPY     • CYST REMOVAL Right     REMOVED UNDER RIGHT ARM. BENIGN   • EMBOLIZATION CEREBRAL N/A 11/26/2018    Procedure: Cerebral angiography and embolization of cerebral aneurysm with possible Neuroform stent;  Surgeon: Ranjit Key MD;  Location: [facility] OR 18/19;  Service: Neurosurgery   • HYSTERECTOMY     • PACEMAKER IMPLANTATION Left     [employer]   • PARATHYROID GLAND SURGERY      ONE GLAND RIGHT   • THYROIDECTOMY, PARTIAL Right    • TONSILLECTOMY AND ADENOIDECTOMY     • UPPER GASTROINTESTINAL ENDOSCOPY         Family History   Problem Relation Age of Onset   • Heart disease Father    • Stroke Father    • Heart attack Father    • Hypertension Father    • Stroke Maternal Grandmother    • Hypertension Mother    • Hypertension Sister    • Malig Hyperthermia Neg Hx        Social History     Tobacco Use   • Smoking status: Former      "Packs/day: 1.50     Years: 25.00     Pack years: 37.50     Types: Cigarettes     Quit date:      Years since quittin.9   • Smokeless tobacco: Never   Substance Use Topics   • Alcohol use: Yes     Comment: occasional   • Drug use: No         ECG 12 Lead    Date/Time: 2022 11:56 AM  Performed by: Angie Mcmahon APRN  Authorized by: Angie Mcmahon APRN   Comparison: compared with previous ECG from 2022  Rhythm: paced  Pacing: atrial sensed rhythm             Objective:     Visit Vitals  /90 (BP Location: Left arm, Patient Position: Sitting, Cuff Size: Adult)   Pulse 60   Ht 168.9 cm (66.5\")   Wt 78.9 kg (174 lb)   BMI 27.66 kg/m²             Physical Exam     Lab Review:         Cardiac Procedures:   1. Echocardiogram 22:  • Calculated left ventricular EF = 62% Estimated left ventricular EF was in agreement with the calculated left ventricular EF.  • Left ventricular diastolic function is consistent with (grade I) impaired relaxation.  • The right ventricular cavity is mildly dilated.  • Left atrial volume is moderately increased.  • Saline test results are negative.  • There is mild, bileaflet mitral valve thickening present.  • Mild mitral valve regurgitation is present.  • Moderate tricuspid valve regurgitation is present.  • Estimated right ventricular systolic pressure from tricuspid regurgitation is mildly elevated (35-45 mmHg).     2. Exercise stress perfusion study 22:  • Breast attenuation artifact is present.  • Myocardial perfusion imaging indicates a small-sized, mild-to-moderately severe area of ischemia located in the apex.  • Left ventricular ejection fraction is hyperdynamic (Calculated EF > 70%). .  • Impressions are consistent with a low risk study.      Assessment:         Diagnoses and all orders for this visit:    1. Essential hypertension (Primary)    2. Presence of cardiac pacemaker    3. Right carotid bruit    4. Mixed hyperlipidemia    5. Valvular heart " disease    6. Sick sinus syndrome due to SA node dysfunction (HCC)    7. Tricuspid valve insufficiency, unspecified etiology    8. Nonrheumatic mitral valve regurgitation    Other orders  -     lisinopril (PRINIVIL,ZESTRIL) 10 MG tablet; Take 1 tablet by mouth Daily.  Dispense: 90 tablet; Refill: 3  -     hydroCHLOROthiazide (MICROZIDE) 12.5 MG capsule; Take 1 capsule by mouth Daily.  Dispense: 90 capsule; Refill: 3            Plan:       1. Hypertension: Blood pressure is still elevated. She would like to split the combo pill and take a full dose of lisinopril in the morning and take the HCTZ if needed. She and her sister both feel that she is very good about monitoring her weight and blood pressure and feel comfortable making the decision to take HCTZ when needed. We will try this and I will talk to her next week to see how her BP is doing.  2. HLD: on atorvastatin. Goal LDL < 100. It was 50 in December 2021  3. SSS s/p PPM: [employer] PPM. Remote reading last week no events. I will have her device interrogated in the pacer clinic at her follow up appointment.  4. VHD: mild MR and moderate TR per echocardiogram in June 2022. Compensated. Will continue to monitor.    Thank you for allowing me to participate in this patient's care. Please call with any questions or concerns. Ms. Morales will follow up with Dr. Steve in 3 months.          Your medication list          Accurate as of December 6, 2022 11:55 AM. If you have any questions, ask your nurse or doctor.            START taking these medications      Instructions Last Dose Given Next Dose Due   hydroCHLOROthiazide 12.5 MG capsule  Commonly known as: MICROZIDE  Started by: LINWOOD Gerber      Take 1 capsule by mouth Daily.       lisinopril 10 MG tablet  Commonly known as: PRINIVIL,ZESTRIL  Started by: LINWOOD Gerber      Take 1 tablet by mouth Daily.          CONTINUE taking these medications      Instructions Last Dose Given Next Dose Due    Acetaminophen 500 MG capsule      Daily.       ascorbic acid 1000 MG tablet  Commonly known as: VITAMIN C      Daily.       aspirin  MG tablet      Take 1 tablet by mouth Daily.       atorvastatin 40 MG tablet  Commonly known as: LIPITOR      TAKE 1 TABLET BY MOUTH  DAILY       azelastine 0.15 % solution nasal spray  Commonly known as: ASTEPRO      2 sprays into the nostril(s) as directed by provider 2 (Two) Times a Day. as directed       Cranberry 1000 MG capsule      Take  by mouth.       flunisolide 25 MCG/ACT (0.025%) solution nasal spray  Commonly known as: NASALIDE      USE 2 SPRAYS NASALLY EVERY 12 HOURS       furosemide 40 MG tablet  Commonly known as: Lasix      Take 1 tablet by mouth Daily.       levothyroxine 75 MCG tablet  Commonly known as: SYNTHROID, LEVOTHROID      TAKE 1 TABLET BY MOUTH  DAILY       montelukast 10 MG tablet  Commonly known as: SINGULAIR      TAKE 1 TABLET BY MOUTH  DAILY       pantoprazole 40 MG EC tablet  Commonly known as: PROTONIX      TAKE 1 TABLET BY MOUTH  DAILY       polycarbophil 625 MG tablet tablet      Every 6 (Six) Hours.       polycarbophil 625 MG tablet tablet      Take 625 mg by mouth Daily.       rOPINIRole 1 MG tablet  Commonly known as: REQUIP      Take 1 tablet by mouth 3 (Three) Times a Day.       Turmeric Curcumin 500 MG capsule      Take  by mouth.       Vitamin D3 50 MCG (2000 UT) tablet      Take 2,000 Units by mouth Daily.             Where to Get Your Medications      These medications were sent to [facility] [id number] - [address] [facility] [address] - [phone]  - [phone] [place] [address]    Phone: [phone]   · hydroCHLOROthiazide 12.5 MG capsule  · lisinopril 10 MG tablet           LINWOOD Gerber  12/06/22  9:49 AM EST

## 2022-12-06 ENCOUNTER — OFFICE VISIT (OUTPATIENT)
Dept: CARDIOLOGY | Facility: CLINIC | Age: 77
End: 2022-12-06

## 2022-12-06 VITALS
DIASTOLIC BLOOD PRESSURE: 90 MMHG | HEIGHT: 67 IN | HEART RATE: 60 BPM | WEIGHT: 174 LBS | SYSTOLIC BLOOD PRESSURE: 140 MMHG | BODY MASS INDEX: 27.31 KG/M2

## 2022-12-06 DIAGNOSIS — I38 VALVULAR HEART DISEASE: Chronic | ICD-10-CM

## 2022-12-06 DIAGNOSIS — E78.2 MIXED HYPERLIPIDEMIA: Chronic | ICD-10-CM

## 2022-12-06 DIAGNOSIS — I07.1 TRICUSPID VALVE INSUFFICIENCY, UNSPECIFIED ETIOLOGY: ICD-10-CM

## 2022-12-06 DIAGNOSIS — Z95.0 PRESENCE OF CARDIAC PACEMAKER: Chronic | ICD-10-CM

## 2022-12-06 DIAGNOSIS — I34.0 NONRHEUMATIC MITRAL VALVE REGURGITATION: ICD-10-CM

## 2022-12-06 DIAGNOSIS — R09.89 RIGHT CAROTID BRUIT: Chronic | ICD-10-CM

## 2022-12-06 DIAGNOSIS — I10 ESSENTIAL HYPERTENSION: Primary | Chronic | ICD-10-CM

## 2022-12-06 DIAGNOSIS — I49.5 SICK SINUS SYNDROME DUE TO SA NODE DYSFUNCTION: ICD-10-CM

## 2022-12-06 PROCEDURE — 99214 OFFICE O/P EST MOD 30 MIN: CPT | Performed by: NURSE PRACTITIONER

## 2022-12-06 PROCEDURE — 93000 ELECTROCARDIOGRAM COMPLETE: CPT | Performed by: NURSE PRACTITIONER

## 2022-12-06 RX ORDER — HYDROCHLOROTHIAZIDE 12.5 MG/1
12.5 CAPSULE, GELATIN COATED ORAL DAILY
Qty: 90 CAPSULE | Refills: 3 | Status: SHIPPED | OUTPATIENT
Start: 2022-12-06 | End: 2023-02-07 | Stop reason: SDUPTHER

## 2022-12-06 RX ORDER — LISINOPRIL 10 MG/1
10 TABLET ORAL DAILY
Qty: 90 TABLET | Refills: 3 | Status: SHIPPED | OUTPATIENT
Start: 2022-12-06 | End: 2023-03-03 | Stop reason: SDUPTHER

## 2022-12-06 RX ORDER — CALCIUM POLYCARBOPHIL 625 MG
625 TABLET ORAL DAILY
COMMUNITY

## 2022-12-08 ENCOUNTER — OFFICE VISIT (OUTPATIENT)
Dept: FAMILY MEDICINE CLINIC | Facility: CLINIC | Age: 77
End: 2022-12-08

## 2022-12-08 VITALS
RESPIRATION RATE: 16 BRPM | DIASTOLIC BLOOD PRESSURE: 74 MMHG | HEIGHT: 67 IN | BODY MASS INDEX: 27.31 KG/M2 | HEART RATE: 64 BPM | TEMPERATURE: 97.4 F | SYSTOLIC BLOOD PRESSURE: 148 MMHG | WEIGHT: 174 LBS | OXYGEN SATURATION: 98 %

## 2022-12-08 DIAGNOSIS — I10 ESSENTIAL HYPERTENSION: Primary | Chronic | ICD-10-CM

## 2022-12-08 PROCEDURE — 99212 OFFICE O/P EST SF 10 MIN: CPT | Performed by: NURSE PRACTITIONER

## 2022-12-08 NOTE — PROGRESS NOTES
Subjective   Meme Morales is a 77 y.o. female.     History of Present Illness     Patient presents to office to f/u on HTN.  She has seen cardiologist since last visit and had her lisinopril/hctz changed to separate lisinopril 10 mg tablet and hctz 12.5 mg,  She is taking 1/2 tablet of lisinopril daily.  She is only taking HCTZ when needed for edema and has only taken 1 in the past week. She has been checking BP at home BID and readings range mostly from 110s-140/60s-70s.    The following portions of the patient's history were reviewed and updated as appropriate: allergies, current medications, past family history, past medical history, past social history, past surgical history and problem list.    Review of Systems   Constitutional: Negative for unexpected weight change.   Respiratory: Negative for shortness of breath.    Cardiovascular: Negative for chest pain and palpitations.   Psychiatric/Behavioral: Negative for behavioral problems.       Objective   Physical Exam  Vitals and nursing note reviewed.   Constitutional:       Appearance: Normal appearance. She is well-developed.   Cardiovascular:      Rate and Rhythm: Normal rate and regular rhythm.   Pulmonary:      Effort: Pulmonary effort is normal.      Breath sounds: Normal breath sounds.   Neurological:      Mental Status: She is alert and oriented to person, place, and time.   Psychiatric:         Mood and Affect: Mood normal.         Behavior: Behavior normal.         Thought Content: Thought content normal.         Judgment: Judgment normal.         Assessment & Plan   Diagnoses and all orders for this visit:    1. Essential hypertension (Primary)          Continue current medication regimen.

## 2022-12-13 ENCOUNTER — OFFICE (AMBULATORY)
Dept: URBAN - METROPOLITAN AREA CLINIC 76 | Facility: CLINIC | Age: 77
End: 2022-12-13

## 2022-12-13 VITALS
SYSTOLIC BLOOD PRESSURE: 145 MMHG | DIASTOLIC BLOOD PRESSURE: 82 MMHG | HEIGHT: 68 IN | WEIGHT: 173 LBS | HEART RATE: 65 BPM

## 2022-12-13 DIAGNOSIS — K22.70 BARRETT'S ESOPHAGUS WITHOUT DYSPLASIA: ICD-10-CM

## 2022-12-13 DIAGNOSIS — K57.32 DIVERTICULITIS OF LARGE INTESTINE WITHOUT PERFORATION OR ABS: ICD-10-CM

## 2022-12-13 DIAGNOSIS — K31.7 POLYP OF STOMACH AND DUODENUM: ICD-10-CM

## 2022-12-13 DIAGNOSIS — K29.70 GASTRITIS, UNSPECIFIED, WITHOUT BLEEDING: ICD-10-CM

## 2022-12-13 DIAGNOSIS — K44.9 DIAPHRAGMATIC HERNIA WITHOUT OBSTRUCTION OR GANGRENE: ICD-10-CM

## 2022-12-13 DIAGNOSIS — Z95.0 PRESENCE OF CARDIAC PACEMAKER: ICD-10-CM

## 2022-12-13 DIAGNOSIS — R93.3 ABNORMAL FINDINGS ON DIAGNOSTIC IMAGING OF OTHER PARTS OF DI: ICD-10-CM

## 2022-12-13 PROCEDURE — 99204 OFFICE O/P NEW MOD 45 MIN: CPT | Performed by: INTERNAL MEDICINE

## 2022-12-16 ENCOUNTER — TELEPHONE (OUTPATIENT)
Dept: CARDIOLOGY | Facility: CLINIC | Age: 77
End: 2022-12-16

## 2022-12-16 NOTE — TELEPHONE ENCOUNTER
Patient is calling with updated BP readings since medications changes. She is taking lisinopril 10mg daily and hctz only when she has gained 1 lb over night. She is not taking any Lasix because she was unsure if she should be taking that with her hctz. Before medication changes she was taking the lasix PRN.     12/10: 148/71  12/11: 161/72  12/12: 153/81  12/13: 159/84  12/14: 161/83  12/15: 160/79  12/16: 154/80    She has been checking her BPs first thing in the AM. She said she has checked them in the evening and after medications as well. She did not have exact readings to give me for those but said on average it runs 140/70. She states overall she feels ok but she still feels like her BP is too high.     Julissa Clemente RN  Triage [facility]

## 2022-12-16 NOTE — TELEPHONE ENCOUNTER
Notified patient about recommendations. She is going to start taking the hctz daily. Told her to check BP 1-2 hours after AM meds and call if consistently running greater than 130/80. She verbalized understanding.     Julissa Clemente RN  Triage [facility]

## 2022-12-30 ENCOUNTER — TELEPHONE (OUTPATIENT)
Dept: CARDIOLOGY | Facility: CLINIC | Age: 77
End: 2022-12-30

## 2022-12-30 NOTE — TELEPHONE ENCOUNTER
I spoke with Meme Morales and updated pt on recommendations from provider.  Pt verbalized understanding and has no further questions at this time.    Thank you,    Renuka Westfall, RN  Triage [facility]  12/30/22 10:20 EST

## 2022-12-30 NOTE — TELEPHONE ENCOUNTER
Pt called this morning to provide BP readings from home recently.     Before meds BP Before meds HR After meds BP After meds HR   23-Dec 145/76 60 94/53** 78   24-Dec 139/75 60 144/61 61   25-Dec 142/74 60     26-Dec 140/77 69 128/63 65   27-Dec 133/70 60 115/50 60   28-Dec 147/70 62 135/68 67   29-Dec 135/73 61 127/61 61     ** Pt did report symptoms of fatigue, mild SOA, dizziness, and lightheadedness with this BP.  She says she did not recheck it later that day.    I reviewed her home medications with her; she is taking them all as prescribed.  Pt says that since her LOV on 12/6, she has not been taking the 40 mg furosemide daily.  She said that she will take it PRN if she gains weight, weighing daily and monitors her I&O closely at home, but that she hasn't needed the extra diuresis.    If we make any medication changes, pt requests that we send scripts to the [facility] listed on her preferred pharmacy.    Thank you,    Renuka Westfall, PHAM  Triage [facility]  12/30/22 10:06 EST

## 2023-02-01 VITALS
OXYGEN SATURATION: 100 % | OXYGEN SATURATION: 94 % | RESPIRATION RATE: 8 BRPM | RESPIRATION RATE: 19 BRPM | DIASTOLIC BLOOD PRESSURE: 80 MMHG | OXYGEN SATURATION: 95 % | DIASTOLIC BLOOD PRESSURE: 67 MMHG | DIASTOLIC BLOOD PRESSURE: 99 MMHG | SYSTOLIC BLOOD PRESSURE: 149 MMHG | RESPIRATION RATE: 18 BRPM | TEMPERATURE: 97.2 F | RESPIRATION RATE: 16 BRPM | SYSTOLIC BLOOD PRESSURE: 148 MMHG | OXYGEN SATURATION: 93 % | SYSTOLIC BLOOD PRESSURE: 186 MMHG | DIASTOLIC BLOOD PRESSURE: 62 MMHG | RESPIRATION RATE: 13 BRPM | SYSTOLIC BLOOD PRESSURE: 132 MMHG | RESPIRATION RATE: 14 BRPM | DIASTOLIC BLOOD PRESSURE: 66 MMHG | WEIGHT: 168 LBS | SYSTOLIC BLOOD PRESSURE: 184 MMHG | HEART RATE: 60 BPM | DIASTOLIC BLOOD PRESSURE: 76 MMHG | SYSTOLIC BLOOD PRESSURE: 187 MMHG | SYSTOLIC BLOOD PRESSURE: 138 MMHG | DIASTOLIC BLOOD PRESSURE: 73 MMHG | HEART RATE: 61 BPM | DIASTOLIC BLOOD PRESSURE: 70 MMHG | OXYGEN SATURATION: 96 % | HEART RATE: 64 BPM | HEART RATE: 79 BPM | OXYGEN SATURATION: 97 % | HEART RATE: 70 BPM | SYSTOLIC BLOOD PRESSURE: 152 MMHG | HEIGHT: 68 IN

## 2023-02-03 ENCOUNTER — OFFICE (AMBULATORY)
Dept: URBAN - METROPOLITAN AREA PATHOLOGY 4 | Facility: PATHOLOGY | Age: 78
End: 2023-02-03
Payer: MEDICARE

## 2023-02-03 ENCOUNTER — AMBULATORY SURGICAL CENTER (AMBULATORY)
Dept: URBAN - METROPOLITAN AREA SURGERY 17 | Facility: SURGERY | Age: 78
End: 2023-02-03
Payer: MEDICARE

## 2023-02-03 DIAGNOSIS — K44.9 DIAPHRAGMATIC HERNIA WITHOUT OBSTRUCTION OR GANGRENE: ICD-10-CM

## 2023-02-03 DIAGNOSIS — K22.70 BARRETT'S ESOPHAGUS WITHOUT DYSPLASIA: ICD-10-CM

## 2023-02-03 LAB
GI HISTOLOGY: A. SELECT: (no result)
GI HISTOLOGY: B. SELECT: (no result)
GI HISTOLOGY: PDF REPORT: (no result)

## 2023-02-03 PROCEDURE — 43239 EGD BIOPSY SINGLE/MULTIPLE: CPT | Performed by: INTERNAL MEDICINE

## 2023-02-03 PROCEDURE — 88305 TISSUE EXAM BY PATHOLOGIST: CPT | Performed by: INTERNAL MEDICINE

## 2023-02-07 RX ORDER — HYDROCHLOROTHIAZIDE 12.5 MG/1
12.5 CAPSULE, GELATIN COATED ORAL DAILY
Qty: 90 CAPSULE | Refills: 3 | Status: SHIPPED | OUTPATIENT
Start: 2023-02-07 | End: 2023-03-28

## 2023-02-10 ENCOUNTER — TELEPHONE (OUTPATIENT)
Dept: FAMILY MEDICINE CLINIC | Facility: CLINIC | Age: 78
End: 2023-02-10
Payer: MEDICARE

## 2023-02-10 DIAGNOSIS — J30.1 CHRONIC ALLERGIC RHINITIS DUE TO POLLEN: ICD-10-CM

## 2023-02-10 RX ORDER — AZELASTINE HCL 205.5 UG/1
2 SPRAY NASAL 2 TIMES DAILY
Qty: 120 ML | Refills: 1 | Status: SHIPPED | OUTPATIENT
Start: 2023-02-10

## 2023-02-10 RX ORDER — FLUNISOLIDE 0.25 MG/ML
2 SOLUTION NASAL EVERY 12 HOURS
Qty: 100 ML | Refills: 1 | Status: SHIPPED | OUTPATIENT
Start: 2023-02-10

## 2023-02-10 NOTE — TELEPHONE ENCOUNTER
Caller: Meme Morales    Relationship: Self    Best call back number: [phone]     Requested Prescriptions: flunisolide (NASALIDE) 25 MCG/ACT (0.025%) solution nasal spray   azelastine (ASTEPRO) 0.15 % solution nasal spray     Pharmacy where request should be sent:    [facility] - [place], [place] - [address] [facility] [phone] [facility] [phone]   [phone]  Additional details provided by patient: PATIENT IS CALLING TO REQUEST A NEW 90 DAY PRESCRIPTION WITH REFILLS FOR THE ABOVE MEDICATIONS.    Does the patient have less than a 3 day supply:  [x] Yes  [] No    Would you like a call back once the refill request has been completed: [] Yes [x] No    If the office needs to give you a call back, can they leave a voicemail: [] Yes [x] No    Miriam Pittman, RegSched Rep   02/10/23 14:33 EST     PLEASE ADVISE.

## 2023-02-10 NOTE — TELEPHONE ENCOUNTER
Booster shot 1/10/22  Symptoms: sore arm. Headache and chills x 48 hours. Called off work 1/11/22  [facility]  Notified of response. I sent RX

## 2023-02-17 PROCEDURE — 93296 REM INTERROG EVL PM/IDS: CPT | Performed by: INTERNAL MEDICINE

## 2023-02-17 PROCEDURE — 93294 REM INTERROG EVL PM/LDLS PM: CPT | Performed by: INTERNAL MEDICINE

## 2023-03-03 RX ORDER — LISINOPRIL 10 MG/1
10 TABLET ORAL DAILY
Qty: 90 TABLET | Refills: 3 | Status: SHIPPED | OUTPATIENT
Start: 2023-03-03 | End: 2023-03-07 | Stop reason: SDUPTHER

## 2023-03-07 RX ORDER — LISINOPRIL 10 MG/1
10 TABLET ORAL DAILY
Qty: 90 TABLET | Refills: 3 | Status: SHIPPED | OUTPATIENT
Start: 2023-03-07

## 2023-03-28 ENCOUNTER — OFFICE VISIT (OUTPATIENT)
Dept: CARDIOLOGY | Facility: CLINIC | Age: 78
End: 2023-03-28
Payer: MEDICARE

## 2023-03-28 ENCOUNTER — CLINICAL SUPPORT NO REQUIREMENTS (OUTPATIENT)
Dept: CARDIOLOGY | Facility: CLINIC | Age: 78
End: 2023-03-28
Payer: MEDICARE

## 2023-03-28 VITALS
BODY MASS INDEX: 28.08 KG/M2 | SYSTOLIC BLOOD PRESSURE: 140 MMHG | DIASTOLIC BLOOD PRESSURE: 80 MMHG | HEART RATE: 64 BPM | OXYGEN SATURATION: 99 % | HEIGHT: 66 IN

## 2023-03-28 DIAGNOSIS — I10 ESSENTIAL HYPERTENSION: Chronic | ICD-10-CM

## 2023-03-28 DIAGNOSIS — I49.5 SICK SINUS SYNDROME DUE TO SA NODE DYSFUNCTION: Primary | ICD-10-CM

## 2023-03-28 DIAGNOSIS — Z95.0 PRESENCE OF CARDIAC PACEMAKER: Chronic | ICD-10-CM

## 2023-03-28 PROCEDURE — 3077F SYST BP >= 140 MM HG: CPT | Performed by: INTERNAL MEDICINE

## 2023-03-28 PROCEDURE — 93000 ELECTROCARDIOGRAM COMPLETE: CPT | Performed by: INTERNAL MEDICINE

## 2023-03-28 PROCEDURE — 3079F DIAST BP 80-89 MM HG: CPT | Performed by: INTERNAL MEDICINE

## 2023-03-28 PROCEDURE — 99214 OFFICE O/P EST MOD 30 MIN: CPT | Performed by: INTERNAL MEDICINE

## 2023-03-28 PROCEDURE — 1160F RVW MEDS BY RX/DR IN RCRD: CPT | Performed by: INTERNAL MEDICINE

## 2023-03-28 PROCEDURE — 1159F MED LIST DOCD IN RCRD: CPT | Performed by: INTERNAL MEDICINE

## 2023-03-28 RX ORDER — AMLODIPINE BESYLATE 2.5 MG/1
2.5 TABLET ORAL DAILY
Qty: 30 TABLET | Refills: 5 | Status: SHIPPED | OUTPATIENT
Start: 2023-03-28

## 2023-03-28 RX ORDER — HYDROCHLOROTHIAZIDE 12.5 MG/1
12.5 CAPSULE, GELATIN COATED ORAL DAILY PRN
Qty: 90 CAPSULE | Refills: 3
Start: 2023-03-28

## 2023-03-28 RX ORDER — ONDANSETRON 4 MG/1
TABLET, ORALLY DISINTEGRATING ORAL
COMMUNITY
Start: 2023-01-11

## 2023-03-28 NOTE — PROGRESS NOTES
Subjective:     Encounter Date:03/28/2023      Patient ID: Meme Morales is a 77 y.o. female.    Chief Complaint:  History of Present Illness    77-year-old with hypertension, hyperlipidemia, sick sinus syndrome status post dual-chamber permanent pacemaker placement, cerebral aneurysm status post coiling, chronic kidney disease, thoracic outlet syndrome secondary to cervical rib resulting in occasional right arm numbness, hypothyroidism, who presents for follow-up.    The patient was previously followed by Dr. Xochilt Conner.  She last saw Dr. Conner in 6/2022.  The patient reported that she had been having issues with orthostatic hypotension.  She indicated at the time that her blood pressure was dropping down to the 70s with standing.  At that time Dr. Walker discontinued amlodipine and placed her on lisinopril/hydrochlorothiazide.  Her blood pressure continued to drop so she cut her dosage in half and was taking 10 mg of lisinopril and 12.5 mg of hydrochlorothiazide.  She was also taking furosemide as needed about once a week.  She is complaining of worsening dyspnea and fatigue with activity.  Dr. Conner recommended staying on the low-dose of lisinopril-hydrochlorothiazide and proceeding with stress test and an echocardiogram.  These were both performed on 6/17/2022.  Stress test showed a small mild to moderately severe area of apical ischemia and an EF of greater than 70%.  Echocardiogram showed normal left ventricular systolic function wall motion with an EF of 60%, grade 1 diastolic dysfunction, mildly dilated right ventricle, moderately dilated left atrium, mild mitral valve and moderate tricuspid valve regurgitation and mild pulmonary hypertension.  No further work-up was recommended regarding her abnormal stress test since this was a low risk finding.    She saw LINWOOD Gerber in 12/2022.  She reported that her dyspnea was at her baseline.  Her blood pressures were running a little bit  high with systolics in the 140s to 150s.  The patient and her sister wanted to split the lisinopril and hydrochlorothiazide and to separate pills.    She returns today for 3-month follow-up.  She is currently taking lisinopril 10 mg daily.  She only takes the hydrochlorothiazide as needed which is usually about once a week.  The patient and her sister indicate that if she takes the hydrochlorothiazide every day she becomes too dehydrated and her blood pressures decline.  They also indicate that when she takes a higher dose of lisinopril her blood pressures drop.  She brings in a list of her blood pressure readings.  The majority of her readings are before she takes her medications in the morning.  Before her medications her systolic blood pressures are in the 150s to 160s.  After she takes the lisinopril in the morning her systolic pressures run in the 140s.  The patient and her sister believe that she was doing better when she was on the amlodipine.  They do not recall that she had any issues with low blood pressures on amlodipine previously.    She denies any chest pain.  She reports infrequent episodes of palpitations.  She denies any worsening lower extremity swelling.  She continues to have issues with lightheadedness with position changes.  She reports over the last few months she has noticed worsening dyspnea and fatigue with activity.    Review of Systems   Constitutional: Positive for malaise/fatigue.   HENT: Negative for hearing loss, hoarse voice, nosebleeds and sore throat.    Eyes: Negative for pain.   Cardiovascular: Positive for dyspnea on exertion, leg swelling and palpitations. Negative for chest pain, claudication, cyanosis, irregular heartbeat, near-syncope, orthopnea, paroxysmal nocturnal dyspnea and syncope.   Respiratory: Negative for shortness of breath and snoring.    Endocrine: Negative for cold intolerance, heat intolerance, polydipsia, polyphagia and polyuria.   Skin: Negative for itching  and rash.   Musculoskeletal: Negative for arthritis, falls, joint pain, joint swelling, muscle cramps, muscle weakness and myalgias.   Gastrointestinal: Negative for constipation, diarrhea, dysphagia, heartburn, hematemesis, hematochezia, melena, nausea and vomiting.   Genitourinary: Negative for frequency, hematuria and hesitancy.   Neurological: Positive for light-headedness. Negative for excessive daytime sleepiness, dizziness, headaches, numbness and weakness.   Psychiatric/Behavioral: Negative for depression. The patient is not nervous/anxious.          Current Outpatient Medications:   •  Acetaminophen 500 MG capsule, Daily., Disp: , Rfl:   •  ascorbic acid (VITAMIN C) 1000 MG tablet, Daily., Disp: , Rfl:   •  aspirin  MG tablet, Take 1 tablet by mouth Daily., Disp: 30 tablet, Rfl: 6  •  atorvastatin (LIPITOR) 40 MG tablet, TAKE 1 TABLET BY MOUTH  DAILY, Disp: 90 tablet, Rfl: 3  •  azelastine (ASTEPRO) 0.15 % solution nasal spray, 2 sprays into the nostril(s) as directed by provider 2 (Two) Times a Day. as directed, Disp: 120 mL, Rfl: 1  •  Cholecalciferol (VITAMIN D3) 2000 units tablet, Take 1 tablet by mouth Daily., Disp: , Rfl:   •  Cranberry 1000 MG capsule, Take  by mouth., Disp: , Rfl:   •  flunisolide (NASALIDE) 25 MCG/ACT (0.025%) solution nasal spray, Inhale 2 sprays Every 12 (Twelve) Hours., Disp: 100 mL, Rfl: 1  •  furosemide (Lasix) 40 MG tablet, Take 1 tablet by mouth Daily., Disp: 90 tablet, Rfl: 1  •  hydroCHLOROthiazide (MICROZIDE) 12.5 MG capsule, Take 1 capsule by mouth Daily., Disp: 90 capsule, Rfl: 3  •  levothyroxine (SYNTHROID, LEVOTHROID) 75 MCG tablet, TAKE 1 TABLET BY MOUTH  DAILY, Disp: 90 tablet, Rfl: 3  •  lisinopril (PRINIVIL,ZESTRIL) 10 MG tablet, Take 1 tablet by mouth Daily., Disp: 90 tablet, Rfl: 3  •  montelukast (SINGULAIR) 10 MG tablet, TAKE 1 TABLET BY MOUTH  DAILY, Disp: 90 tablet, Rfl: 3  •  ondansetron ODT (ZOFRAN-ODT) 4 MG disintegrating tablet, , Disp: , Rfl:   •   pantoprazole (PROTONIX) 40 MG EC tablet, TAKE 1 TABLET BY MOUTH  DAILY, Disp: 90 tablet, Rfl: 3  •  polycarbophil (calcium polycarbophil) 625 MG tablet tablet, Every 6 (Six) Hours., Disp: , Rfl:   •  polycarbophil 625 MG tablet tablet, Take 1 tablet by mouth Daily., Disp: , Rfl:   •  rOPINIRole (REQUIP) 1 MG tablet, Take 1 tablet by mouth 3 (Three) Times a Day., Disp: 270 tablet, Rfl: 1  •  Turmeric Curcumin 500 MG capsule, Take  by mouth., Disp: , Rfl:     Past Medical History:   Diagnosis Date   • Acid reflux    • Arthritis    • Arthritis of carpometacarpal (CMC) joint of right thumb    • Francisco esophagus    • Basal cell carcinoma    • Brain aneurysm    • Burning with urination    • Cancer (HCC)     non malignant skin cancers   • Cerebral aneurysm    • Congestive heart failure (HCC)    • Depression    • Difficulty swallowing    • Disease of thyroid gland     HISTORY OF NODULES, SURGERY.    • Encounter for Medicare annual wellness exam    • Esophageal reflux    • Exposure to chickenpox    • H/O TB skin testing     WITH POSITIVE RESULT. NEG CXR.   • Heart murmur    • High blood pressure    • History of hyperparathyroidism    • History of migraine headaches    • Hoarseness    • Hypercalcemia    • Hyperlipidemia    • Hypertension    • Hypothyroidism    • Leaky heart valve    • Low back pain    • Osteopenia with high risk of fracture    • Overweight with body mass index (BMI) of 25 to 25.9 in adult    • Pacemaker     IMPLANTED LEFT SIDE   • Palpitations    • Pseudocholinesterase deficiency     AUNT ON FATHER'S SIDE OF FAMILY   • Pulmonary hypertension (HCC)    • Rheumatic multiple valve disease    • RLS (restless legs syndrome)    • Rubella    • Seasonal allergies    • Sick sinus syndrome due to SA node dysfunction (HCC)     REASON FOR PACEMAKER   • Skin disorder    • Slow to wake up after anesthesia    • Stage 3 chronic kidney disease (HCC)    • Symptoms of urinary tract infection    • Thoracic outlet syndrome    •  Thyroid disease    • Tinea cruris    • Tricuspid valve regurgitation    • Urinary hesitancy    • Valvular heart disease    • Vitamin D deficiency        Past Surgical History:   Procedure Laterality Date   • CARDIAC CATHETERIZATION      Post-Proc Data:  Lesions successfully dilated    • CATARACT EXTRACTION WITH INTRAOCULAR LENS IMPLANT      LEFT AND RIGHT   • CEREBRAL ANGIOGRAM N/A 2019    Procedure: CEREBRAL ANGIOGRAM;  Surgeon: Ranjit Key MD;  Location: [facility] OR ;  Service: Neurosurgery   • COLONOSCOPY     • CYST REMOVAL Right     REMOVED UNDER RIGHT ARM. BENIGN   • EMBOLIZATION CEREBRAL N/A 2018    Procedure: Cerebral angiography and embolization of cerebral aneurysm with possible Neuroform stent;  Surgeon: Ranjit Key MD;  Location: [facility] OR ;  Service: Neurosurgery   • HYSTERECTOMY     • PACEMAKER IMPLANTATION Left     Self-A-r-T SCIENTIFIC   • PARATHYROID GLAND SURGERY      ONE GLAND RIGHT   • THYROIDECTOMY, PARTIAL Right    • TONSILLECTOMY AND ADENOIDECTOMY     • UPPER GASTROINTESTINAL ENDOSCOPY         Family History   Problem Relation Age of Onset   • Heart disease Father    • Stroke Father    • Heart attack Father    • Hypertension Father    • Stroke Maternal Grandmother    • Hypertension Mother    • Hypertension Sister    • Malig Hyperthermia Neg Hx        Social History     Tobacco Use   • Smoking status: Former     Packs/day: 1.50     Years: 25.00     Pack years: 37.50     Types: Cigarettes     Quit date:      Years since quittin.2   • Smokeless tobacco: Never   Vaping Use   • Vaping Use: Never used   Substance Use Topics   • Alcohol use: Yes     Comment: occasional   • Drug use: No         ECG 12 Lead    Date/Time: 3/28/2023 4:22 PM  Performed by: Yessy Steve MD  Authorized by: Yessy Steve MD   Comparison: compared with previous ECG   Similar to previous ECG  Comments: Atrial paced rhythm               Objective:     Visit Vitals  BP  "140/80 (BP Location: Left arm, Patient Position: Sitting, Cuff Size: Adult)   Pulse 64   Ht 167.6 cm (66\")   SpO2 99%   BMI 28.08 kg/m²         Constitutional:       Appearance: Normal appearance. Well-developed.   Eyes:      General: Lids are normal.      Conjunctiva/sclera: Conjunctivae normal.      Pupils: Pupils are equal, round, and reactive to light.   HENT:      Head: Normocephalic and atraumatic.   Neck:      Vascular: No carotid bruit or JVD.      Lymphadenopathy: No cervical adenopathy.   Pulmonary:      Effort: Pulmonary effort is normal.      Breath sounds: Normal breath sounds.   Cardiovascular:      Normal rate. Regular rhythm.      No gallop.   Pulses:     Radial: 2+ bilaterally.  Edema:     Peripheral edema absent.   Abdominal:      Palpations: Abdomen is soft.   Musculoskeletal:      Cervical back: Full passive range of motion without pain, normal range of motion and neck supple. Skin:     General: Skin is warm and dry.   Neurological:      Mental Status: Alert and oriented to person, place, and time.               Assessment:          Diagnosis Plan   1. Sick sinus syndrome due to SA node dysfunction (HCC)        2. Presence of cardiac pacemaker        3. Essential hypertension               Plan:       1.  Dyspnea/fatigue with exertion.  EKG is unchanged.  Although this is a chronic issue her symptoms have progressed over the last few months.  Is possible that her symptoms could represent an anginal equivalent.  She did have a stress test in 6/2022 which showed a low risk finding of mild apical ischemia.  Discussed options with the patient and her sister.  We opted to work on her blood pressures first and see how her symptoms respond before considering further work-up.  If her symptoms continue we may need to consider either repeat stress test or even possibly cardiac catheterization.  2.  Hypertension.  Blood pressures remain mildly elevated.  She is effectively only taking lisinopril 10 mg daily " at this time.  Both the patient and her sister indicate that the higher dose of lisinopril and taking hydrochlorothiazide and daily basis both drop her blood pressures too drastically.  They report that she was doing better when she was on amlodipine before.  They do not seem to recall any issues with low blood pressures on amlodipine before.  Recommended trying amlodipine at 2.5 mg daily.  3.  Sick sinus syndrome status post permanent pacemaker placement.  Underwent device interrogation in the office today.  Continue routine interrogations.    While the patient return in about 6 weeks to reevaluate her blood pressures and her symptoms.

## 2023-03-31 ENCOUNTER — OFFICE (AMBULATORY)
Dept: URBAN - METROPOLITAN AREA CLINIC 76 | Facility: CLINIC | Age: 78
End: 2023-03-31
Payer: MEDICARE

## 2023-03-31 DIAGNOSIS — E78.00 PURE HYPERCHOLESTEROLEMIA, UNSPECIFIED: ICD-10-CM

## 2023-03-31 DIAGNOSIS — K22.70 BARRETT'S ESOPHAGUS WITHOUT DYSPLASIA: ICD-10-CM

## 2023-03-31 DIAGNOSIS — I10 ESSENTIAL (PRIMARY) HYPERTENSION: ICD-10-CM

## 2023-03-31 DIAGNOSIS — E66.3 OVERWEIGHT: ICD-10-CM

## 2023-03-31 DIAGNOSIS — K57.30 DIVERTICULOSIS OF LARGE INTESTINE WITHOUT PERFORATION OR ABS: ICD-10-CM

## 2023-03-31 DIAGNOSIS — K21.9 GASTRO-ESOPHAGEAL REFLUX DISEASE WITHOUT ESOPHAGITIS: ICD-10-CM

## 2023-03-31 PROCEDURE — 99457 RPM TX MGMT 1ST 20 MIN: CPT | Performed by: INTERNAL MEDICINE

## 2023-03-31 PROCEDURE — 99489 CPLX CHRNC CARE EA ADDL 30: CPT | Performed by: INTERNAL MEDICINE

## 2023-03-31 PROCEDURE — 99453 REM MNTR PHYSIOL PARAM SETUP: CPT | Performed by: INTERNAL MEDICINE

## 2023-03-31 PROCEDURE — 99458 RPM TX MGMT EA ADDL 20 MIN: CPT | Performed by: INTERNAL MEDICINE

## 2023-03-31 PROCEDURE — 99487 CPLX CHRNC CARE 1ST 60 MIN: CPT | Performed by: INTERNAL MEDICINE

## 2023-04-26 DIAGNOSIS — J30.1 CHRONIC ALLERGIC RHINITIS DUE TO POLLEN: ICD-10-CM

## 2023-04-26 DIAGNOSIS — E03.9 ACQUIRED HYPOTHYROIDISM: ICD-10-CM

## 2023-04-26 DIAGNOSIS — E78.2 MIXED HYPERLIPIDEMIA: ICD-10-CM

## 2023-04-27 RX ORDER — MONTELUKAST SODIUM 10 MG/1
10 TABLET ORAL EVERY 24 HOURS
Qty: 90 TABLET | Refills: 3 | OUTPATIENT
Start: 2023-04-27

## 2023-04-27 RX ORDER — LEVOTHYROXINE SODIUM 0.07 MG/1
75 TABLET ORAL DAILY
Qty: 90 TABLET | Refills: 3 | OUTPATIENT
Start: 2023-04-27

## 2023-04-27 RX ORDER — ATORVASTATIN CALCIUM 40 MG/1
40 TABLET, FILM COATED ORAL DAILY
Qty: 90 TABLET | Refills: 3 | OUTPATIENT
Start: 2023-04-27

## 2023-04-30 ENCOUNTER — OFFICE (AMBULATORY)
Dept: URBAN - METROPOLITAN AREA CLINIC 76 | Facility: CLINIC | Age: 78
End: 2023-04-30
Payer: MEDICARE

## 2023-04-30 DIAGNOSIS — E66.3 OVERWEIGHT: ICD-10-CM

## 2023-04-30 DIAGNOSIS — K21.9 GASTRO-ESOPHAGEAL REFLUX DISEASE WITHOUT ESOPHAGITIS: ICD-10-CM

## 2023-04-30 DIAGNOSIS — I10 ESSENTIAL (PRIMARY) HYPERTENSION: ICD-10-CM

## 2023-04-30 DIAGNOSIS — K57.30 DIVERTICULOSIS OF LARGE INTESTINE WITHOUT PERFORATION OR ABS: ICD-10-CM

## 2023-04-30 DIAGNOSIS — K22.70 BARRETT'S ESOPHAGUS WITHOUT DYSPLASIA: ICD-10-CM

## 2023-04-30 DIAGNOSIS — E78.00 PURE HYPERCHOLESTEROLEMIA, UNSPECIFIED: ICD-10-CM

## 2023-04-30 PROCEDURE — 99487 CPLX CHRNC CARE 1ST 60 MIN: CPT | Performed by: INTERNAL MEDICINE

## 2023-04-30 PROCEDURE — 99454 REM MNTR PHYSIOL PARAM 16-30: CPT | Performed by: INTERNAL MEDICINE

## 2023-04-30 PROCEDURE — 99457 RPM TX MGMT 1ST 20 MIN: CPT | Performed by: INTERNAL MEDICINE

## 2023-04-30 PROCEDURE — 99489 CPLX CHRNC CARE EA ADDL 30: CPT | Performed by: INTERNAL MEDICINE

## 2023-05-09 ENCOUNTER — OFFICE VISIT (OUTPATIENT)
Dept: CARDIOLOGY | Facility: CLINIC | Age: 78
End: 2023-05-09
Payer: MEDICARE

## 2023-05-09 VITALS
WEIGHT: 170.4 LBS | HEIGHT: 66 IN | DIASTOLIC BLOOD PRESSURE: 60 MMHG | SYSTOLIC BLOOD PRESSURE: 110 MMHG | BODY MASS INDEX: 27.38 KG/M2 | HEART RATE: 62 BPM

## 2023-05-09 DIAGNOSIS — E78.2 MIXED HYPERLIPIDEMIA: Chronic | ICD-10-CM

## 2023-05-09 DIAGNOSIS — I07.1 TRICUSPID VALVE INSUFFICIENCY, UNSPECIFIED ETIOLOGY: ICD-10-CM

## 2023-05-09 DIAGNOSIS — I34.0 NONRHEUMATIC MITRAL VALVE REGURGITATION: ICD-10-CM

## 2023-05-09 DIAGNOSIS — I47.1 ATRIAL TACHYCARDIA: Chronic | ICD-10-CM

## 2023-05-09 DIAGNOSIS — I49.5 SICK SINUS SYNDROME DUE TO SA NODE DYSFUNCTION: ICD-10-CM

## 2023-05-09 DIAGNOSIS — I10 ESSENTIAL HYPERTENSION: Primary | Chronic | ICD-10-CM

## 2023-05-09 DIAGNOSIS — R07.89 CHEST PAIN, ATYPICAL: ICD-10-CM

## 2023-05-09 DIAGNOSIS — R09.89 RIGHT CAROTID BRUIT: Chronic | ICD-10-CM

## 2023-05-09 DIAGNOSIS — I38 VALVULAR HEART DISEASE: Chronic | ICD-10-CM

## 2023-05-09 DIAGNOSIS — Z95.0 PRESENCE OF CARDIAC PACEMAKER: Chronic | ICD-10-CM

## 2023-05-09 NOTE — PROGRESS NOTES
Subjective:     Encounter Date: 05/09/2023      Patient ID: Meme Morales is a 78 y.o. female.    Chief Complaint:follow up HTN, SSS  History of Present Illness  This is a 78 y/o female who previously followed with Dr. Conner and is now following with Dr. Steve. I have seen her in the office in the past. She has a pmhx of hypertension, hyperlipidemia, sick sinus syndrome s/p [employer] PPM, cerebral aneurysm s/p coiling, chronic kidney disease, mild mitral and moderate tricuspid regurgitation. She also has thoracic outlet syndrome secondary to cervical rib.     She was last seen in the office by Dr. Conner in June and was not doing well. Dr. Jain had changed her amlodipine to lisinopril/HCTZ and she was having episodes of SBP in the 70s. She had started taking only half a tablet prior to being seen in the office. This seemed to help so she was instructed to continue to monitor her pressures. She also had complaints of shortness of breath so an echocardiogram and nuclear stress test were ordered. Echocardiogram showed an EF of 62%, grade 1 diastolic dysfunction, mild mitral regurgitation, moderate tricuspid regurgitation and mildly elevated RVSP. Nuclear stress test showed a small-sized, mild to moderately severe area of ischemia in the apex, hyperdynamic EF > 70%, and considered a low risk study.    I saw her in December and she was having issues with hypotension. She requested to take lisinopril and HCTZ separately, using the HCTZ on an as needed basis. We tried this for a week. She reported back with her BP readings which were in the 150-160-80s. I instructed her to start taking the HCTZ daily. She then saw Dr. Steve in March 2023. She was having complaints of dyspnea on exertion. Dr. Steve discussed with the patient further work-up vs working on BP control. She opted to work on BP. She had not been taking HCTZ at the time so Dr. Steve continued her on lisinopril and added amlodipine 2.5 mg.  Daily.    She is here today for a follow up visit. She has brought a blood pressure log with her today and overall her BP is pretty well controlled. On average, she is in the 130s/70s and this is prior to taking her medications. She is 110/60 today. She is taking lisinopril and amlodipine daily and taking HCTZ about 3 times a week based on whether or not she has gained weight. She does note that she has been having chest pain described as a fullness up towards her upper chest/throat area that radiates into her jaw at times. She has associated shortness of breath and palpitations. She at time has upper shoulder pain and pain in the back of her neck. This usually occurs when her BP is low. She does have occasional swelling in her ankles. If it gets very bothersome, she will take a furosemide. She does not have to do this often. She notes pretty profound fatigue. Just walking in from the parking lot wore her out. This has been present for about 9 months.     I have reviewed and updated as appropriate allergies, current medications, past family history, past medical history, past surgical history and problem list.    Review of Systems   Constitutional: Positive for malaise/fatigue. Negative for fever, weight gain and weight loss.   HENT: Negative for congestion, hoarse voice and sore throat.    Eyes: Negative for blurred vision and double vision.   Cardiovascular: Positive for chest pain, dyspnea on exertion, leg swelling and palpitations. Negative for orthopnea and syncope.   Respiratory: Negative for cough and wheezing.    Gastrointestinal: Negative for abdominal pain, hematemesis, hematochezia and melena.   Genitourinary: Negative for dysuria and hematuria.   Neurological: Negative for dizziness, headaches, light-headedness and numbness.   Psychiatric/Behavioral: Negative for depression. The patient is not nervous/anxious.          Current Outpatient Medications:   •  Acetaminophen 500 MG capsule, Daily., Disp: ,  Rfl:   •  amLODIPine (NORVASC) 2.5 MG tablet, Take 1 tablet by mouth Daily., Disp: 30 tablet, Rfl: 5  •  ascorbic acid (VITAMIN C) 1000 MG tablet, Daily., Disp: , Rfl:   •  aspirin  MG tablet, Take 1 tablet by mouth Daily., Disp: 30 tablet, Rfl: 6  •  atorvastatin (LIPITOR) 40 MG tablet, TAKE 1 TABLET BY MOUTH  DAILY, Disp: 90 tablet, Rfl: 3  •  azelastine (ASTEPRO) 0.15 % solution nasal spray, 2 sprays into the nostril(s) as directed by provider 2 (Two) Times a Day. as directed, Disp: 120 mL, Rfl: 1  •  Cholecalciferol (VITAMIN D3) 2000 units tablet, Take 1 tablet by mouth Daily., Disp: , Rfl:   •  Cranberry 1000 MG capsule, Take  by mouth., Disp: , Rfl:   •  flunisolide (NASALIDE) 25 MCG/ACT (0.025%) solution nasal spray, Inhale 2 sprays Every 12 (Twelve) Hours., Disp: 100 mL, Rfl: 1  •  hydroCHLOROthiazide (MICROZIDE) 12.5 MG capsule, Take 1 capsule by mouth Daily As Needed (swelling or weight gain)., Disp: 90 capsule, Rfl: 3  •  levothyroxine (SYNTHROID, LEVOTHROID) 75 MCG tablet, TAKE 1 TABLET BY MOUTH  DAILY, Disp: 90 tablet, Rfl: 3  •  lisinopril (PRINIVIL,ZESTRIL) 10 MG tablet, Take 1 tablet by mouth Daily., Disp: 90 tablet, Rfl: 3  •  montelukast (SINGULAIR) 10 MG tablet, TAKE 1 TABLET BY MOUTH  DAILY, Disp: 90 tablet, Rfl: 3  •  ondansetron ODT (ZOFRAN-ODT) 4 MG disintegrating tablet, , Disp: , Rfl:   •  pantoprazole (PROTONIX) 40 MG EC tablet, TAKE 1 TABLET BY MOUTH  DAILY, Disp: 90 tablet, Rfl: 3  •  polycarbophil (calcium polycarbophil) 625 MG tablet tablet, Every 6 (Six) Hours., Disp: , Rfl:   •  polycarbophil 625 MG tablet tablet, Take 1 tablet by mouth Daily., Disp: , Rfl:   •  rOPINIRole (REQUIP) 1 MG tablet, Take 1 tablet by mouth 3 (Three) Times a Day., Disp: 270 tablet, Rfl: 1  •  Turmeric Curcumin 500 MG capsule, Take  by mouth., Disp: , Rfl:     Past Medical History:   Diagnosis Date   • Acid reflux    • Arthritis    • Arthritis of carpometacarpal (CMC) joint of right thumb    • Francisco  esophagus    • Basal cell carcinoma    • Brain aneurysm    • Burning with urination    • Cancer     non malignant skin cancers   • Cerebral aneurysm    • Congestive heart failure    • Depression    • Difficulty swallowing    • Disease of thyroid gland     HISTORY OF NODULES, SURGERY.    • Encounter for Medicare annual wellness exam    • Esophageal reflux    • Exposure to chickenpox    • H/O TB skin testing     WITH POSITIVE RESULT. NEG CXR.   • Heart murmur    • High blood pressure    • History of hyperparathyroidism    • History of migraine headaches    • Hoarseness    • Hypercalcemia    • Hyperlipidemia    • Hypertension    • Hypothyroidism    • Leaky heart valve    • Low back pain    • Osteopenia with high risk of fracture    • Overweight with body mass index (BMI) of 25 to 25.9 in adult    • Pacemaker     IMPLANTED LEFT SIDE   • Palpitations    • Pseudocholinesterase deficiency     AUNT ON FATHER'S SIDE OF FAMILY   • Pulmonary hypertension    • Rheumatic multiple valve disease    • RLS (restless legs syndrome)    • Rubella    • Seasonal allergies    • Sick sinus syndrome due to SA node dysfunction     REASON FOR PACEMAKER   • Skin disorder    • Slow to wake up after anesthesia    • Stage 3 chronic kidney disease    • Symptoms of urinary tract infection    • Thoracic outlet syndrome    • Thyroid disease    • Tinea cruris    • Tricuspid valve regurgitation    • Urinary hesitancy    • Valvular heart disease    • Vitamin D deficiency        Past Surgical History:   Procedure Laterality Date   • CARDIAC CATHETERIZATION      Post-Proc Data:  Lesions successfully dilated    • CATARACT EXTRACTION WITH INTRAOCULAR LENS IMPLANT      LEFT AND RIGHT   • CEREBRAL ANGIOGRAM N/A 6/26/2019    Procedure: CEREBRAL ANGIOGRAM;  Surgeon: Ranjit Key MD;  Location: [facility] 18/19;  Service: Neurosurgery   • COLONOSCOPY     • CYST REMOVAL Right     REMOVED UNDER RIGHT ARM. BENIGN   • EMBOLIZATION CEREBRAL N/A 11/26/2018  "   Procedure: Cerebral angiography and embolization of cerebral aneurysm with possible Neuroform stent;  Surgeon: Ranjit Key MD;  Location: [facility] OR ;  Service: Neurosurgery   • HYSTERECTOMY     • PACEMAKER IMPLANTATION Left     [employer]   • PARATHYROID GLAND SURGERY      ONE GLAND RIGHT   • THYROIDECTOMY, PARTIAL Right    • TONSILLECTOMY AND ADENOIDECTOMY     • UPPER GASTROINTESTINAL ENDOSCOPY         Family History   Problem Relation Age of Onset   • Heart disease Father    • Stroke Father    • Heart attack Father    • Hypertension Father    • Stroke Maternal Grandmother    • Hypertension Mother    • Hypertension Sister    • Malig Hyperthermia Neg Hx        Social History     Tobacco Use   • Smoking status: Former     Packs/day: 1.50     Years: 25.00     Pack years: 37.50     Types: Cigarettes     Quit date:      Years since quittin.3   • Smokeless tobacco: Never   Vaping Use   • Vaping Use: Never used   Substance Use Topics   • Alcohol use: Yes     Comment: occasional   • Drug use: No         ECG 12 Lead    Date/Time: 2023 2:34 PM  Performed by: Angie Mcmahon APRN  Authorized by: Angie Mcmahon APRN   Comparison: compared with previous ECG from 3/28/2023  Similar to previous ECG  Rhythm: paced  Comments: Atrial paced rhythm               Objective:     Visit Vitals  /60   Pulse 62   Ht 167.6 cm (66\")   Wt 77.3 kg (170 lb 6.4 oz)   BMI 27.50 kg/m²             Physical Exam  Constitutional:       Appearance: Normal appearance. She is normal weight.   HENT:      Head: Normocephalic.   Neck:      Vascular: No carotid bruit.   Cardiovascular:      Rate and Rhythm: Normal rate and regular rhythm.      Chest Wall: PMI is not displaced.      Pulses: Normal pulses.           Radial pulses are 2+ on the right side and 2+ on the left side.        Posterior tibial pulses are 2+ on the right side and 2+ on the left side.      Heart sounds: Normal heart sounds. No murmur " heard.    No friction rub. No gallop.   Pulmonary:      Effort: Pulmonary effort is normal.      Breath sounds: Normal breath sounds.   Abdominal:      General: Bowel sounds are normal. There is no distension.      Palpations: Abdomen is soft.   Musculoskeletal:      Right lower leg: No edema.      Left lower leg: No edema.   Skin:     General: Skin is warm and dry.      Capillary Refill: Capillary refill takes less than 2 seconds.   Neurological:      Mental Status: She is alert and oriented to person, place, and time.   Psychiatric:         Mood and Affect: Mood normal.         Behavior: Behavior normal.         Thought Content: Thought content normal.          Lab Review:         Cardiac Procedures:   1. Echocardiogram 6/17/22:  • Calculated left ventricular EF = 62% Estimated left ventricular EF was in agreement with the calculated left ventricular EF.  • Left ventricular diastolic function is consistent with (grade I) impaired relaxation.  • The right ventricular cavity is mildly dilated.  • Left atrial volume is moderately increased.  • Saline test results are negative.  • There is mild, bileaflet mitral valve thickening present.  • Mild mitral valve regurgitation is present.  • Moderate tricuspid valve regurgitation is present.  • Estimated right ventricular systolic pressure from tricuspid regurgitation is mildly elevated (35-45 mmHg).     2. Exercise stress perfusion study 6/17/22:  • Breast attenuation artifact is present.  • Myocardial perfusion imaging indicates a small-sized, mild-to-moderately severe area of ischemia located in the apex.  • Left ventricular ejection fraction is hyperdynamic (Calculated EF > 70%). .  • Impressions are consistent with a low risk study.      Assessment:         Diagnoses and all orders for this visit:    1. Essential hypertension (Primary)    2. Presence of cardiac pacemaker    3. Right carotid bruit    4. Mixed hyperlipidemia    5. Valvular heart disease    6. Atrial  tachycardia    7. Sick sinus syndrome due to SA node dysfunction (HCC)    8. Tricuspid valve insufficiency, unspecified etiology    9. Nonrheumatic mitral valve regurgitation    10. Chest pain, atypical  -     Stress Test With Myocardial Perfusion One Day; Future    Other orders  -     ECG 12 Lead            Plan:       1. Atypical chest pain: she previously had a stress test about a year ago that showed a small defect in the apex but was still considered a low risk study. I discussed with the patient and with Dr. Steve regarding her symptoms. Will proceed with a repeat stress test.   2. HTN: blood pressure has improved on current regimen. No changes.  3. HLD on atorvastatin. Goal LDL < 100.  3. SSS s/p PPM: [employer] PPM  4. VHD: mild MR and moderate TR per echocardiogram in June 2022. Compensated. Will continue to monitor.    Thank you for allowing me to participate in this patient's care. Please call with any questions or concerns. Ms. Morales will follow up with Dr. Steve in 3 months.          Your medication list          Accurate as of May 9, 2023  2:26 PM. If you have any questions, ask your nurse or doctor.            CONTINUE taking these medications      Instructions Last Dose Given Next Dose Due   Acetaminophen 500 MG capsule      Daily.       amLODIPine 2.5 MG tablet  Commonly known as: NORVASC      Take 1 tablet by mouth Daily.       ascorbic acid 1000 MG tablet  Commonly known as: VITAMIN C      Daily.       aspirin  MG tablet      Take 1 tablet by mouth Daily.       atorvastatin 40 MG tablet  Commonly known as: LIPITOR      TAKE 1 TABLET BY MOUTH  DAILY       azelastine 0.15 % solution nasal spray  Commonly known as: ASTEPRO      2 sprays into the nostril(s) as directed by provider 2 (Two) Times a Day. as directed       Cranberry 1000 MG capsule      Take  by mouth.       flunisolide 25 MCG/ACT (0.025%) solution nasal spray  Commonly known as: NASALIDE      Inhale 2 sprays Every 12  (Twelve) Hours.       hydroCHLOROthiazide 12.5 MG capsule  Commonly known as: MICROZIDE      Take 1 capsule by mouth Daily As Needed (swelling or weight gain).       levothyroxine 75 MCG tablet  Commonly known as: SYNTHROID, LEVOTHROID      TAKE 1 TABLET BY MOUTH  DAILY       lisinopril 10 MG tablet  Commonly known as: PRINIVIL,ZESTRIL      Take 1 tablet by mouth Daily.       montelukast 10 MG tablet  Commonly known as: SINGULAIR      TAKE 1 TABLET BY MOUTH  DAILY       ondansetron ODT 4 MG disintegrating tablet  Commonly known as: ZOFRAN-ODT           pantoprazole 40 MG EC tablet  Commonly known as: PROTONIX      TAKE 1 TABLET BY MOUTH  DAILY       polycarbophil 625 MG tablet tablet      Every 6 (Six) Hours.       polycarbophil 625 MG tablet tablet      Take 1 tablet by mouth Daily.       rOPINIRole 1 MG tablet  Commonly known as: REQUIP      Take 1 tablet by mouth 3 (Three) Times a Day.       Turmeric Curcumin 500 MG capsule      Take  by mouth.       Vitamin D3 50 MCG (2000 UT) tablet      Take 1 tablet by mouth Daily.                Angie Mcmahon, LINWOOD  05/09/23  9:49 AM EST

## 2023-05-10 DIAGNOSIS — G25.81 RESTLESS LEGS SYNDROME (RLS): ICD-10-CM

## 2023-05-10 RX ORDER — ROPINIROLE 1 MG/1
TABLET, FILM COATED ORAL
Qty: 270 TABLET | Refills: 1 | OUTPATIENT
Start: 2023-05-10

## 2023-05-14 DIAGNOSIS — G25.81 RESTLESS LEGS SYNDROME (RLS): ICD-10-CM

## 2023-05-15 DIAGNOSIS — K21.9 GASTROESOPHAGEAL REFLUX DISEASE WITHOUT ESOPHAGITIS: ICD-10-CM

## 2023-05-15 DIAGNOSIS — G25.81 RESTLESS LEGS SYNDROME (RLS): ICD-10-CM

## 2023-05-15 RX ORDER — ROPINIROLE 1 MG/1
1 TABLET, FILM COATED ORAL 3 TIMES DAILY
Qty: 270 TABLET | Refills: 1 | OUTPATIENT
Start: 2023-05-15

## 2023-05-15 RX ORDER — ROPINIROLE 1 MG/1
TABLET, FILM COATED ORAL
Qty: 270 TABLET | Refills: 1 | OUTPATIENT
Start: 2023-05-15

## 2023-05-15 NOTE — TELEPHONE ENCOUNTER
Caller: Meme Morales    Relationship: Self    Best call back number: [phone]*    Requested Prescriptions:   Requested Prescriptions     Pending Prescriptions Disp Refills   • rOPINIRole (REQUIP) 1 MG tablet 270 tablet 1     Sig: Take 1 tablet by mouth 3 (Three) Times a Day.        Pharmacy where request should be sent: [facility] [id number] [address] - [phone] [facility] [phone] FX     Last office visit with prescribing clinician: 12/8/2022   Last telemedicine visit with prescribing clinician: 2/10/2023   Next office visit with prescribing clinician: Visit date not found     Additional details provided by patient: PATIENT STATES SHE HAS 2 DAYS OF MEDICATION REMAINING.    Does the patient have less than a 3 day supply:  [x] Yes  [] No    Would you like a call back once the refill request has been completed: [] Yes [x] No    If the office needs to give you a call back, can they leave a voicemail: [] Yes [x] No    Soheila Martin   05/15/23 09:40 EDT

## 2023-05-16 DIAGNOSIS — G25.81 RESTLESS LEGS SYNDROME (RLS): ICD-10-CM

## 2023-05-16 RX ORDER — ROPINIROLE 1 MG/1
TABLET, FILM COATED ORAL
Qty: 270 TABLET | Refills: 1 | OUTPATIENT
Start: 2023-05-16

## 2023-05-16 RX ORDER — PANTOPRAZOLE SODIUM 40 MG/1
40 TABLET, DELAYED RELEASE ORAL DAILY
Qty: 90 TABLET | Refills: 3 | OUTPATIENT
Start: 2023-05-16

## 2023-05-17 DIAGNOSIS — G25.81 RESTLESS LEGS SYNDROME (RLS): ICD-10-CM

## 2023-05-17 NOTE — TELEPHONE ENCOUNTER
Caller: Mmee Morales    Relationship: Self    Best call back number: [phone]*    Requested Prescriptions:   Requested Prescriptions     Pending Prescriptions Disp Refills   • rOPINIRole (REQUIP) 1 MG tablet 270 tablet 1     Sig: Take 1 tablet by mouth 3 (Three) Times a Day.        Pharmacy where request should be sent: [facility] [id number] [address] - [phone] [facility] [phone]      Last office visit with prescribing clinician: 12/8/2022   Last telemedicine visit with prescribing clinician: 5/15/2023   Next office visit with prescribing clinician: Visit date not found     Additional details provided by patient: PATIENT ONLY HAS 2 DAYS OF MEDICATION REMAINING.    Does the patient have less than a 3 day supply:  [x] Yes  [] No    Would you like a call back once the refill request has been completed: [] Yes [x] No    If the office needs to give you a call back, can they leave a voicemail: [] Yes [x] No    Soheila Martin   05/17/23 10:14 EDT

## 2023-05-19 ENCOUNTER — HOSPITAL ENCOUNTER (OUTPATIENT)
Dept: CARDIOLOGY | Facility: HOSPITAL | Age: 78
Discharge: HOME | End: 2023-05-19
Payer: MEDICARE

## 2023-05-19 VITALS — HEIGHT: 67 IN | BODY MASS INDEX: 26.06 KG/M2 | WEIGHT: 166 LBS

## 2023-05-19 DIAGNOSIS — G25.81 RESTLESS LEGS SYNDROME (RLS): ICD-10-CM

## 2023-05-19 DIAGNOSIS — R07.89 CHEST PAIN, ATYPICAL: ICD-10-CM

## 2023-05-19 LAB
BH CV NUCLEAR PRIOR STUDY: 1
BH CV REST NUCLEAR ISOTOPE DOSE: 10.7 MCI
BH CV STRESS BP STAGE 1: NORMAL
BH CV STRESS COMMENTS STAGE 1: NORMAL
BH CV STRESS DOSE REGADENOSON STAGE 1: 0.4
BH CV STRESS DURATION MIN STAGE 1: 0
BH CV STRESS DURATION SEC STAGE 1: 10
BH CV STRESS HR STAGE 1: 73
BH CV STRESS NUCLEAR ISOTOPE DOSE: 34.6 MCI
BH CV STRESS PROTOCOL 1: NORMAL
BH CV STRESS RECOVERY BP: NORMAL MMHG
BH CV STRESS RECOVERY HR: 71 BPM
BH CV STRESS STAGE 1: 1
LV EF NUC BP: 72 %
MAXIMAL PREDICTED HEART RATE: 140 BPM
PERCENT MAX PREDICTED HR: 52.14 %
STRESS BASELINE BP: NORMAL MMHG
STRESS BASELINE HR: 60 BPM
STRESS PERCENT HR: 61 %
STRESS POST EXERCISE DUR SEC: 10 SEC
STRESS POST PEAK BP: NORMAL MMHG
STRESS POST PEAK HR: 73 BPM
STRESS TARGET HR: 119 BPM

## 2023-05-19 PROCEDURE — 25010000002 REGADENOSON 0.4 MG/5ML SOLUTION: Performed by: NURSE PRACTITIONER

## 2023-05-19 PROCEDURE — 0 TECHNETIUM TETROFOSMIN KIT: Performed by: NURSE PRACTITIONER

## 2023-05-19 PROCEDURE — 78452 HT MUSCLE IMAGE SPECT MULT: CPT

## 2023-05-19 PROCEDURE — A9502 TC99M TETROFOSMIN: HCPCS | Performed by: NURSE PRACTITIONER

## 2023-05-19 PROCEDURE — 93017 CV STRESS TEST TRACING ONLY: CPT

## 2023-05-19 RX ORDER — ROPINIROLE 1 MG/1
1 TABLET, FILM COATED ORAL 3 TIMES DAILY
Qty: 270 TABLET | Refills: 1 | Status: SHIPPED | OUTPATIENT
Start: 2023-05-19 | End: 2023-05-19 | Stop reason: SDUPTHER

## 2023-05-19 RX ORDER — ROPINIROLE 1 MG/1
1 TABLET, FILM COATED ORAL 3 TIMES DAILY
Qty: 270 TABLET | Refills: 1 | Status: SHIPPED | OUTPATIENT
Start: 2023-05-19

## 2023-05-19 RX ORDER — REGADENOSON 0.08 MG/ML
0.4 INJECTION, SOLUTION INTRAVENOUS
Status: COMPLETED | OUTPATIENT
Start: 2023-05-19 | End: 2023-05-19

## 2023-05-19 RX ADMIN — TETROFOSMIN 1 DOSE: 1.38 INJECTION, POWDER, LYOPHILIZED, FOR SOLUTION INTRAVENOUS at 08:26

## 2023-05-19 RX ADMIN — REGADENOSON 0.4 MG: 0.08 INJECTION, SOLUTION INTRAVENOUS at 08:26

## 2023-05-19 RX ADMIN — TETROFOSMIN 1 DOSE: 1.38 INJECTION, POWDER, LYOPHILIZED, FOR SOLUTION INTRAVENOUS at 07:14

## 2023-05-19 NOTE — PROGRESS NOTES
Reviewed results and recommendations with Meme Morales.  Patient verbalized understanding of results and recommendations.    Thank you,  Jojo PENA RN  Triage Nurse [facility] [place]

## 2023-05-19 NOTE — TELEPHONE ENCOUNTER
Pt is asking for a refill you have not given this to her. Last office visit was 12/8/23. She was getting this from her old PCP

## 2023-05-31 ENCOUNTER — OFFICE (AMBULATORY)
Dept: URBAN - METROPOLITAN AREA CLINIC 76 | Facility: CLINIC | Age: 78
End: 2023-05-31
Payer: MEDICARE

## 2023-05-31 DIAGNOSIS — K57.30 DIVERTICULOSIS OF LARGE INTESTINE WITHOUT PERFORATION OR ABS: ICD-10-CM

## 2023-05-31 DIAGNOSIS — I10 ESSENTIAL (PRIMARY) HYPERTENSION: ICD-10-CM

## 2023-05-31 DIAGNOSIS — E78.00 PURE HYPERCHOLESTEROLEMIA, UNSPECIFIED: ICD-10-CM

## 2023-05-31 DIAGNOSIS — K21.9 GASTRO-ESOPHAGEAL REFLUX DISEASE WITHOUT ESOPHAGITIS: ICD-10-CM

## 2023-05-31 DIAGNOSIS — K22.70 BARRETT'S ESOPHAGUS WITHOUT DYSPLASIA: ICD-10-CM

## 2023-05-31 DIAGNOSIS — E66.3 OVERWEIGHT: ICD-10-CM

## 2023-05-31 PROCEDURE — 99454 REM MNTR PHYSIOL PARAM 16-30: CPT | Performed by: INTERNAL MEDICINE

## 2023-05-31 PROCEDURE — 99489 CPLX CHRNC CARE EA ADDL 30: CPT | Performed by: INTERNAL MEDICINE

## 2023-05-31 PROCEDURE — 99487 CPLX CHRNC CARE 1ST 60 MIN: CPT | Performed by: INTERNAL MEDICINE

## 2023-05-31 PROCEDURE — 99457 RPM TX MGMT 1ST 20 MIN: CPT | Performed by: INTERNAL MEDICINE

## 2023-06-30 ENCOUNTER — OFFICE (AMBULATORY)
Dept: URBAN - METROPOLITAN AREA CLINIC 76 | Facility: CLINIC | Age: 78
End: 2023-06-30
Payer: MEDICARE

## 2023-06-30 DIAGNOSIS — K21.9 GASTRO-ESOPHAGEAL REFLUX DISEASE WITHOUT ESOPHAGITIS: ICD-10-CM

## 2023-06-30 DIAGNOSIS — E66.3 OVERWEIGHT: ICD-10-CM

## 2023-06-30 DIAGNOSIS — K22.70 BARRETT'S ESOPHAGUS WITHOUT DYSPLASIA: ICD-10-CM

## 2023-06-30 DIAGNOSIS — I10 ESSENTIAL (PRIMARY) HYPERTENSION: ICD-10-CM

## 2023-06-30 DIAGNOSIS — E78.00 PURE HYPERCHOLESTEROLEMIA, UNSPECIFIED: ICD-10-CM

## 2023-06-30 DIAGNOSIS — K57.30 DIVERTICULOSIS OF LARGE INTESTINE WITHOUT PERFORATION OR ABS: ICD-10-CM

## 2023-06-30 PROCEDURE — 99489 CPLX CHRNC CARE EA ADDL 30: CPT | Performed by: INTERNAL MEDICINE

## 2023-06-30 PROCEDURE — 99457 RPM TX MGMT 1ST 20 MIN: CPT | Performed by: INTERNAL MEDICINE

## 2023-06-30 PROCEDURE — 99454 REM MNTR PHYSIOL PARAM 16-30: CPT | Performed by: INTERNAL MEDICINE

## 2023-06-30 PROCEDURE — 99487 CPLX CHRNC CARE 1ST 60 MIN: CPT | Performed by: INTERNAL MEDICINE

## 2023-07-26 NOTE — TELEPHONE ENCOUNTER
Medication requested (name and dose): rOPINIRole (REQUIP) 1 MG tablet    Pharmacy where request should be sent: [facility] [address] AT [facility] - [phone]  - [phone] FX     Additional details provided by patient: PT WILL BE RUNNING OUT OF MEDICATION BEFORE APPT ON 04/19 AND ASKING FOR PARTIAL REFILL IF ALL POSSIBLE. SHE STATED SHE HAS 3 DAYS LEFT     Best call back number: [phone]    Does the patient have less than a 3 day supply:  [] Yes  [x] No    Marlin Plaza  04/13/22, 08:06 EDT    
DM (diabetes mellitus)

## 2023-07-31 ENCOUNTER — OFFICE (AMBULATORY)
Dept: URBAN - METROPOLITAN AREA CLINIC 76 | Facility: CLINIC | Age: 78
End: 2023-07-31
Payer: MEDICARE

## 2023-07-31 DIAGNOSIS — I10 ESSENTIAL (PRIMARY) HYPERTENSION: ICD-10-CM

## 2023-07-31 DIAGNOSIS — K21.9 GASTRO-ESOPHAGEAL REFLUX DISEASE WITHOUT ESOPHAGITIS: ICD-10-CM

## 2023-07-31 DIAGNOSIS — K57.30 DIVERTICULOSIS OF LARGE INTESTINE WITHOUT PERFORATION OR ABS: ICD-10-CM

## 2023-07-31 DIAGNOSIS — K22.70 BARRETT'S ESOPHAGUS WITHOUT DYSPLASIA: ICD-10-CM

## 2023-07-31 DIAGNOSIS — E66.3 OVERWEIGHT: ICD-10-CM

## 2023-07-31 DIAGNOSIS — E78.00 PURE HYPERCHOLESTEROLEMIA, UNSPECIFIED: ICD-10-CM

## 2023-07-31 PROCEDURE — 99439 CHRNC CARE MGMT STAF EA ADDL: CPT | Performed by: INTERNAL MEDICINE

## 2023-07-31 PROCEDURE — 99490 CHRNC CARE MGMT STAFF 1ST 20: CPT | Performed by: INTERNAL MEDICINE

## 2023-07-31 PROCEDURE — 99454 REM MNTR PHYSIOL PARAM 16-30: CPT | Performed by: INTERNAL MEDICINE

## 2023-08-18 PROCEDURE — 93296 REM INTERROG EVL PM/IDS: CPT | Performed by: INTERNAL MEDICINE

## 2023-08-18 PROCEDURE — 93294 REM INTERROG EVL PM/LDLS PM: CPT | Performed by: INTERNAL MEDICINE

## 2023-08-28 NOTE — PROGRESS NOTES
Subjective:     Encounter Date:08/29/2023      Patient ID: Meme Morales is a 78 y.o. female.    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     At a follow up in 3/2023 she was taking lisinopril 10 mg daily and only taking the hydrochlorothiazide as needed which was usually about once a week.  If she took hydrochlorothiazide every day she reportedly became too dehydrated and her blood pressures would decline.  They also indicated her blood pressure would decline with a higher dose of lisinopril.  Her blood pressure readings before taking her medications was in the 150s to 160s and after in the 140s.  She also reported worsening dyspnea and fatigue.  Discussed options and decided to work on blood pressures first.  Amlodipine 2.5 mg daily was added to her regimen.      She followed up with LINWOOD Gerber in 5/2023.  At that time her BP was well controlled.  She continued to complain of dyspnea on exertion but also chest fullness.  Opted to proceed with a repeat stress test at that time.  This was performed on 5/19 and showed no evidence of ischemia.     She presents today for routine follow up.  Overall she feels well.  She continues to have dyspnea on exertion which is largely stable.  She did have her pacemaker checked today and they changed the accleratometer threshold and this will help with some of her symptoms.    She denies any chest discomfort.  She reports some lightheadedness with position changes.  She has some mild lower extremity swelling but nothing significant.  She reports intermittent palpitations but this is also stable.  Blood pressures remain well  controlled.      Prior History:    The patient was previously followed by Dr. Xochilt Conner.  She last saw Dr. Conner in 6/2022.  The patient reported that she had been having issues with orthostatic hypotension.  She indicated at the time that her blood pressure was dropping down to the 70s with standing.  At that time discontinued amlodipine and placed her on lisinopril/hydrochlorothiazide.  Her blood pressure continued to drop so she cut her dosage in half and was taking 10 mg of lisinopril and 12.5 mg of hydrochlorothiazide.  She was also taking furosemide as needed about once a week.  She is complaining of worsening dyspnea and fatigue with activity.  Dr. Conenr recommended staying on the low-dose of lisinopril-hydrochlorothiazide and proceeding with stress test and an echocardiogram.  These were both performed on 6/17/2022.  Stress test showed a small mild to moderately severe area of apical ischemia and an EF of greater than 70%.  Echocardiogram showed normal left ventricular systolic function wall motion with an EF of 60%, grade 1 diastolic dysfunction, mildly dilated right ventricle, moderately dilated left atrium, mild mitral valve and moderate tricuspid valve regurgitation and mild pulmonary hypertension.  No further work-up was recommended regarding her abnormal stress test since this was a low risk finding.     She saw LINWOOD Gerber in 12/2022.  She reported that her dyspnea was at her baseline.  Her blood pressures were running a little bit high with systolics in the 140s to 150s.  The patient and her sister wanted to split the lisinopril and hydrochlorothiazide and to separate pills.           Review of Systems   Constitutional: Negative for malaise/fatigue.   HENT:  Negative for hearing loss, hoarse voice, nosebleeds and sore throat.    Eyes:  Negative for pain.   Cardiovascular:  Positive for dyspnea on exertion, leg swelling and palpitations. Negative for chest pain, claudication,  cyanosis, irregular heartbeat, near-syncope, orthopnea, paroxysmal nocturnal dyspnea and syncope.   Respiratory:  Negative for shortness of breath and snoring.    Endocrine: Negative for cold intolerance, heat intolerance, polydipsia, polyphagia and polyuria.   Skin:  Negative for itching and rash.   Musculoskeletal:  Negative for arthritis, falls, joint pain, joint swelling, muscle cramps, muscle weakness and myalgias.   Gastrointestinal:  Negative for constipation, diarrhea, dysphagia, heartburn, hematemesis, hematochezia, melena, nausea and vomiting.   Genitourinary:  Negative for frequency, hematuria and hesitancy.   Neurological:  Positive for light-headedness. Negative for excessive daytime sleepiness, dizziness, headaches, numbness and weakness.   Psychiatric/Behavioral:  Negative for depression. The patient is not nervous/anxious.        Current Outpatient Medications:     Acetaminophen 500 MG capsule, Daily., Disp: , Rfl:     amLODIPine (NORVASC) 2.5 MG tablet, Take 1 tablet by mouth Daily., Disp: 30 tablet, Rfl: 5    ascorbic acid (VITAMIN C) 1000 MG tablet, Daily., Disp: , Rfl:     aspirin  MG tablet, Take 1 tablet by mouth Daily., Disp: 30 tablet, Rfl: 6    atorvastatin (LIPITOR) 40 MG tablet, TAKE 1 TABLET BY MOUTH  DAILY, Disp: 90 tablet, Rfl: 3    azelastine (ASTEPRO) 0.15 % solution nasal spray, 2 sprays into the nostril(s) as directed by provider 2 (Two) Times a Day. as directed, Disp: 120 mL, Rfl: 1    Cholecalciferol (VITAMIN D3) 2000 units tablet, Take 1 tablet by mouth Daily., Disp: , Rfl:     Cranberry 1000 MG capsule, Take  by mouth., Disp: , Rfl:     flunisolide (NASALIDE) 25 MCG/ACT (0.025%) solution nasal spray, Inhale 2 sprays Every 12 (Twelve) Hours., Disp: 100 mL, Rfl: 1    hydroCHLOROthiazide (MICROZIDE) 12.5 MG capsule, Take 1 capsule by mouth Daily As Needed (swelling or weight gain)., Disp: 90 capsule, Rfl: 3    levothyroxine (SYNTHROID, LEVOTHROID) 75 MCG tablet, TAKE 1 TABLET  BY MOUTH  DAILY, Disp: 90 tablet, Rfl: 3    lisinopril (PRINIVIL,ZESTRIL) 10 MG tablet, Take 1 tablet by mouth Daily., Disp: 90 tablet, Rfl: 3    montelukast (SINGULAIR) 10 MG tablet, TAKE 1 TABLET BY MOUTH  DAILY, Disp: 90 tablet, Rfl: 3    ondansetron ODT (ZOFRAN-ODT) 4 MG disintegrating tablet, , Disp: , Rfl:     pantoprazole (PROTONIX) 40 MG EC tablet, TAKE 1 TABLET BY MOUTH  DAILY, Disp: 90 tablet, Rfl: 3    polycarbophil (calcium polycarbophil) 625 MG tablet tablet, Every 6 (Six) Hours., Disp: , Rfl:     polycarbophil 625 MG tablet tablet, Take 1 tablet by mouth Daily., Disp: , Rfl:     rOPINIRole (REQUIP) 1 MG tablet, Take 1 tablet by mouth 3 (Three) Times a Day., Disp: 270 tablet, Rfl: 1    Turmeric Curcumin 500 MG capsule, Take  by mouth., Disp: , Rfl:     Past Medical History:   Diagnosis Date    Acid reflux     Arthritis     Arthritis of carpometacarpal (CMC) joint of right thumb     Francisco esophagus     Basal cell carcinoma     Brain aneurysm     Burning with urination     Cancer     non malignant skin cancers    Cerebral aneurysm     Congestive heart failure     Depression     Difficulty swallowing     Disease of thyroid gland     HISTORY OF NODULES, SURGERY.     Encounter for Medicare annual wellness exam     Esophageal reflux     Exposure to chickenpox     H/O TB skin testing     WITH POSITIVE RESULT. NEG CXR.    Heart murmur     High blood pressure     History of hyperparathyroidism     History of migraine headaches     Hoarseness     Hypercalcemia     Hyperlipidemia     Hypertension     Hypothyroidism     Leaky heart valve     Low back pain     Osteopenia with high risk of fracture     Overweight with body mass index (BMI) of 25 to 25.9 in adult     Pacemaker     IMPLANTED LEFT SIDE    Palpitations     Pseudocholinesterase deficiency     AUNT ON FATHER'S SIDE OF FAMILY    Pulmonary hypertension     Rheumatic multiple valve disease     RLS (restless legs syndrome)     Rubella     Seasonal allergies      Sick sinus syndrome due to SA node dysfunction     REASON FOR PACEMAKER    Skin disorder     Slow to wake up after anesthesia     Stage 3 chronic kidney disease     Symptoms of urinary tract infection     Thoracic outlet syndrome     Thyroid disease     Tinea cruris     Tricuspid valve regurgitation     Urinary hesitancy     Valvular heart disease     Vitamin D deficiency        Past Surgical History:   Procedure Laterality Date    CARDIAC CATHETERIZATION      Post-Proc Data:  Lesions successfully dilated     CATARACT EXTRACTION WITH INTRAOCULAR LENS IMPLANT      LEFT AND RIGHT    CEREBRAL ANGIOGRAM N/A 2019    Procedure: CEREBRAL ANGIOGRAM;  Surgeon: Ranjit Key MD;  Location: [facility] OR ;  Service: Neurosurgery    COLONOSCOPY      CYST REMOVAL Right     REMOVED UNDER RIGHT ARM. BENIGN    EMBOLIZATION CEREBRAL N/A 2018    Procedure: Cerebral angiography and embolization of cerebral aneurysm with possible Neuroform stent;  Surgeon: Ranjit Key MD;  Location: [facility] OR ;  Service: Neurosurgery    HYSTERECTOMY      PACEMAKER IMPLANTATION Left     [employer]    PARATHYROID GLAND SURGERY      ONE GLAND RIGHT    THYROIDECTOMY, PARTIAL Right     TONSILLECTOMY AND ADENOIDECTOMY      UPPER GASTROINTESTINAL ENDOSCOPY         Family History   Problem Relation Age of Onset    Hypertension Mother     Heart disease Father     Stroke Father     Heart attack Father     Hypertension Father     Hypertension Sister     Stroke Maternal Grandmother     Diabetes type II Child     Hypertension Child     Malig Hyperthermia Neg Hx        Social History     Tobacco Use    Smoking status: Former     Packs/day: 1.50     Years: 25.00     Pack years: 37.50     Types: Cigarettes     Quit date:      Years since quittin.6    Smokeless tobacco: Never   Vaping Use    Vaping Use: Never used   Substance Use Topics    Alcohol use: Yes     Comment: occasional    Drug use: No         ECG  "12 Lead    Date/Time: 8/29/2023 1:05 PM  Performed by: Yessy Steve MD  Authorized by: Yessy Steve MD   Conduction: incomplete right bundle branch block  Comments: Atrial paced rhythm           Objective:     Visit Vitals  /80 (BP Location: Left arm, Patient Position: Sitting, Cuff Size: Adult)   Pulse 70   Ht 170.2 cm (67.01\")   Wt 77.1 kg (170 lb)   SpO2 98%   BMI 26.62 kg/mý         Constitutional:       Appearance: Normal appearance. Well-developed.   Eyes:      General: Lids are normal.      Conjunctiva/sclera: Conjunctivae normal.      Pupils: Pupils are equal, round, and reactive to light.   HENT:      Head: Normocephalic and atraumatic.   Neck:      Vascular: No carotid bruit or JVD.      Lymphadenopathy: No cervical adenopathy.   Pulmonary:      Effort: Pulmonary effort is normal.      Breath sounds: Normal breath sounds.   Cardiovascular:      Normal rate. Regular rhythm.      No gallop.    Pulses:     Radial: 2+ bilaterally.  Abdominal:      Palpations: Abdomen is soft.   Musculoskeletal:      Cervical back: Full passive range of motion without pain, normal range of motion and neck supple. Skin:     General: Skin is warm and dry.   Neurological:      Mental Status: Alert and oriented to person, place, and time.             Assessment:          Diagnosis Plan   1. Essential hypertension        2. Presence of cardiac pacemaker        3. Mixed hyperlipidemia        4. Sick sinus syndrome due to SA node dysfunction        5. Acquired hypothyroidism        6. Stage 3 chronic kidney disease, unspecified whether stage 3a or 3b CKD               Plan:         1.  Dyspnea on exertion.  Chronic symptom.  Cardiac work-up has been unremarkable.  It appears that pacemaker heart rate responsiveness was changed today help.  We will see if this makes a difference.  2.  Hypertension.  Well-controlled on current regimen medications.  3.  Dual-chamber permanent pacemaker placement.  Changes made as above.  " Otherwise functioning normally.  Continue routine device checks.  4.  Hypothyroidism  5.  Chronic kidney disease stage III.    We will plan on seeing the patient back again in 6 months.

## 2023-08-29 ENCOUNTER — OFFICE VISIT (OUTPATIENT)
Dept: CARDIOLOGY | Facility: CLINIC | Age: 78
End: 2023-08-29
Payer: MEDICARE

## 2023-08-29 ENCOUNTER — CLINICAL SUPPORT NO REQUIREMENTS (OUTPATIENT)
Dept: CARDIOLOGY | Facility: CLINIC | Age: 78
End: 2023-08-29
Payer: MEDICARE

## 2023-08-29 VITALS
HEIGHT: 67 IN | HEART RATE: 70 BPM | SYSTOLIC BLOOD PRESSURE: 122 MMHG | DIASTOLIC BLOOD PRESSURE: 80 MMHG | BODY MASS INDEX: 26.68 KG/M2 | WEIGHT: 170 LBS | OXYGEN SATURATION: 98 %

## 2023-08-29 DIAGNOSIS — I49.5 SICK SINUS SYNDROME DUE TO SA NODE DYSFUNCTION: ICD-10-CM

## 2023-08-29 DIAGNOSIS — I10 ESSENTIAL HYPERTENSION: Primary | Chronic | ICD-10-CM

## 2023-08-29 DIAGNOSIS — I49.5 SICK SINUS SYNDROME DUE TO SA NODE DYSFUNCTION: Primary | ICD-10-CM

## 2023-08-29 DIAGNOSIS — N18.30 STAGE 3 CHRONIC KIDNEY DISEASE, UNSPECIFIED WHETHER STAGE 3A OR 3B CKD: ICD-10-CM

## 2023-08-29 DIAGNOSIS — E78.2 MIXED HYPERLIPIDEMIA: Chronic | ICD-10-CM

## 2023-08-29 DIAGNOSIS — E03.9 ACQUIRED HYPOTHYROIDISM: ICD-10-CM

## 2023-08-29 DIAGNOSIS — Z95.0 PRESENCE OF CARDIAC PACEMAKER: Chronic | ICD-10-CM

## 2023-08-29 NOTE — LETTER
August 29, 2023       No Recipients    Patient: Meme Morales   YOB: 1945   Date of Visit: 8/29/2023       Dear LINWOOD Vargas ([place])    Meme Morales was in my office today. Below is a copy of my note.    If you have questions, please do not hesitate to call me. I look forward to following Meme along with you.         Sincerely,        Yessy Steve MD        CC:   No Recipients        Subjective:     Encounter Date:08/29/2023      Patient ID: Meme Morales is a 78 y.o. female.    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     At a follow up in 3/2023 she was taking lisinopril 10 mg daily and only taking the hydrochlorothiazide as needed which was usually about once a week.  If she took hydrochlorothiazide every day she reportedly became too dehydrated and her blood pressures would decline.  They also indicated her blood pressure would decline with a higher dose of lisinopril.  Her blood pressure readings before taking her medications was in the 150s to 160s and after in the 140s.  She also reported worsening dyspnea and fatigue.  Discussed options and decided to work on blood pressures first.  Amlodipine 2.5 mg daily was added to her regimen.      She followed up with LINWOOD Gerber in 5/2023.  At that time her BP was well controlled.  She continued to complain of dyspnea on exertion but also chest fullness.  Opted to proceed with a repeat stress test at that time.  This was performed on 5/19 and showed no evidence of ischemia.     She presents today for routine follow up.       Prior History:    The patient was previously followed by   Xochilt Conner.  She last saw Dr. Conner in 6/2022.  The patient reported that she had been having issues with orthostatic hypotension.  She indicated at the time that her blood pressure was dropping down to the 70s with standing.  At that time discontinued amlodipine and placed her on lisinopril/hydrochlorothiazide.  Her blood pressure continued to drop so she cut her dosage in half and was taking 10 mg of lisinopril and 12.5 mg of hydrochlorothiazide.  She was also taking furosemide as needed about once a week.  She is complaining of worsening dyspnea and fatigue with activity.  Dr. Conner recommended staying on the low-dose of lisinopril-hydrochlorothiazide and proceeding with stress test and an echocardiogram.  These were both performed on 6/17/2022.  Stress test showed a small mild to moderately severe area of apical ischemia and an EF of greater than 70%.  Echocardiogram showed normal left ventricular systolic function wall motion with an EF of 60%, grade 1 diastolic dysfunction, mildly dilated right ventricle, moderately dilated left atrium, mild mitral valve and moderate tricuspid valve regurgitation and mild pulmonary hypertension.  No further work-up was recommended regarding her abnormal stress test since this was a low risk finding.     She saw LINWOOD Gerber in 12/2022.  She reported that her dyspnea was at her baseline.  Her blood pressures were running a little bit high with systolics in the 140s to 150s.  The patient and her sister wanted to split the lisinopril and hydrochlorothiazide and to separate pills.           ROS      Current Outpatient Medications:     Acetaminophen 500 MG capsule, Daily., Disp: , Rfl:     amLODIPine (NORVASC) 2.5 MG tablet, Take 1 tablet by mouth Daily., Disp: 30 tablet, Rfl: 5    ascorbic acid (VITAMIN C) 1000 MG tablet, Daily., Disp: , Rfl:     aspirin  MG tablet, Take 1 tablet by mouth Daily., Disp: 30 tablet, Rfl: 6    atorvastatin (LIPITOR) 40 MG  tablet, TAKE 1 TABLET BY MOUTH  DAILY, Disp: 90 tablet, Rfl: 3    azelastine (ASTEPRO) 0.15 % solution nasal spray, 2 sprays into the nostril(s) as directed by provider 2 (Two) Times a Day. as directed, Disp: 120 mL, Rfl: 1    Cholecalciferol (VITAMIN D3) 2000 units tablet, Take 1 tablet by mouth Daily., Disp: , Rfl:     Cranberry 1000 MG capsule, Take  by mouth., Disp: , Rfl:     flunisolide (NASALIDE) 25 MCG/ACT (0.025%) solution nasal spray, Inhale 2 sprays Every 12 (Twelve) Hours., Disp: 100 mL, Rfl: 1    hydroCHLOROthiazide (MICROZIDE) 12.5 MG capsule, Take 1 capsule by mouth Daily As Needed (swelling or weight gain)., Disp: 90 capsule, Rfl: 3    levothyroxine (SYNTHROID, LEVOTHROID) 75 MCG tablet, TAKE 1 TABLET BY MOUTH  DAILY, Disp: 90 tablet, Rfl: 3    lisinopril (PRINIVIL,ZESTRIL) 10 MG tablet, Take 1 tablet by mouth Daily., Disp: 90 tablet, Rfl: 3    montelukast (SINGULAIR) 10 MG tablet, TAKE 1 TABLET BY MOUTH  DAILY, Disp: 90 tablet, Rfl: 3    ondansetron ODT (ZOFRAN-ODT) 4 MG disintegrating tablet, , Disp: , Rfl:     pantoprazole (PROTONIX) 40 MG EC tablet, TAKE 1 TABLET BY MOUTH  DAILY, Disp: 90 tablet, Rfl: 3    polycarbophil (calcium polycarbophil) 625 MG tablet tablet, Every 6 (Six) Hours., Disp: , Rfl:     polycarbophil 625 MG tablet tablet, Take 1 tablet by mouth Daily., Disp: , Rfl:     rOPINIRole (REQUIP) 1 MG tablet, Take 1 tablet by mouth 3 (Three) Times a Day., Disp: 270 tablet, Rfl: 1    Turmeric Curcumin 500 MG capsule, Take  by mouth., Disp: , Rfl:     Past Medical History:   Diagnosis Date    Acid reflux     Arthritis     Arthritis of carpometacarpal (CMC) joint of right thumb     Francisco esophagus     Basal cell carcinoma     Brain aneurysm     Burning with urination     Cancer     non malignant skin cancers    Cerebral aneurysm     Congestive heart failure     Depression     Difficulty swallowing     Disease of thyroid gland     HISTORY OF NODULES, SURGERY.      Encounter for Medicare annual wellness exam     Esophageal reflux     Exposure to chickenpox     H/O TB skin testing     WITH POSITIVE RESULT. NEG CXR.    Heart murmur     High blood pressure     History of hyperparathyroidism     History of migraine headaches     Hoarseness     Hypercalcemia     Hyperlipidemia     Hypertension     Hypothyroidism     Leaky heart valve     Low back pain     Osteopenia with high risk of fracture     Overweight with body mass index (BMI) of 25 to 25.9 in adult     Pacemaker     IMPLANTED LEFT SIDE    Palpitations     Pseudocholinesterase deficiency     AUNT ON FATHER'S SIDE OF FAMILY    Pulmonary hypertension     Rheumatic multiple valve disease     RLS (restless legs syndrome)     Rubella     Seasonal allergies     Sick sinus syndrome due to SA node dysfunction     REASON FOR PACEMAKER    Skin disorder     Slow to wake up after anesthesia     Stage 3 chronic kidney disease     Symptoms of urinary tract infection     Thoracic outlet syndrome     Thyroid disease     Tinea cruris     Tricuspid valve regurgitation     Urinary hesitancy     Valvular heart disease     Vitamin D deficiency        Past Surgical History:   Procedure Laterality Date    CARDIAC CATHETERIZATION      Post-Proc Data:  Lesions successfully dilated     CATARACT EXTRACTION WITH INTRAOCULAR LENS IMPLANT      LEFT AND RIGHT    CEREBRAL ANGIOGRAM N/A 6/26/2019    Procedure: CEREBRAL ANGIOGRAM;  Surgeon: Ranjit Key MD;  Location: [facility] OR 18/19;  Service: Neurosurgery    COLONOSCOPY      CYST REMOVAL Right     REMOVED UNDER RIGHT ARM. BENIGN    EMBOLIZATION CEREBRAL N/A 11/26/2018    Procedure: Cerebral angiography and embolization of cerebral aneurysm with possible Neuroform stent;  Surgeon: Ranjit Key MD;  Location: [facility] OR 18/19;  Service: Neurosurgery    HYSTERECTOMY      PACEMAKER IMPLANTATION Left     [employer]    PARATHYROID  "GLAND SURGERY      ONE GLAND RIGHT    THYROIDECTOMY, PARTIAL Right     TONSILLECTOMY AND ADENOIDECTOMY      UPPER GASTROINTESTINAL ENDOSCOPY         Family History   Problem Relation Age of Onset    Hypertension Mother     Heart disease Father     Stroke Father     Heart attack Father     Hypertension Father     Hypertension Sister     Stroke Maternal Grandmother     Diabetes type II Child     Hypertension Child     Malig Hyperthermia Neg Hx        Social History     Tobacco Use    Smoking status: Former     Packs/day: 1.50     Years: 25.00     Pack years: 37.50     Types: Cigarettes     Quit date:      Years since quittin.6    Smokeless tobacco: Never   Vaping Use    Vaping Use: Never used   Substance Use Topics    Alcohol use: Yes     Comment: occasional    Drug use: No       Procedures       Objective:     Visit Vitals  /80 (BP Location: Left arm, Patient Position: Sitting, Cuff Size: Adult)   Pulse 70   Ht 170.2 cm (67.01\")   Wt 77.1 kg (170 lb)   SpO2 98%   BMI 26.62 kg/mý         Physical Exam    Lab Review:       Assessment:          Diagnosis Plan   1. Essential hypertension        2. Presence of cardiac pacemaker        3. Mixed hyperlipidemia        4. Sick sinus syndrome due to SA node dysfunction        5. Acquired hypothyroidism        6. Stage 3 chronic kidney disease, unspecified whether stage 3a or 3b CKD               Plan:                  "

## 2023-08-31 ENCOUNTER — OFFICE (AMBULATORY)
Dept: URBAN - METROPOLITAN AREA CLINIC 76 | Facility: CLINIC | Age: 78
End: 2023-08-31
Payer: MEDICARE

## 2023-08-31 DIAGNOSIS — E66.3 OVERWEIGHT: ICD-10-CM

## 2023-08-31 DIAGNOSIS — I10 ESSENTIAL (PRIMARY) HYPERTENSION: ICD-10-CM

## 2023-08-31 DIAGNOSIS — K21.9 GASTRO-ESOPHAGEAL REFLUX DISEASE WITHOUT ESOPHAGITIS: ICD-10-CM

## 2023-08-31 DIAGNOSIS — E78.00 PURE HYPERCHOLESTEROLEMIA, UNSPECIFIED: ICD-10-CM

## 2023-08-31 DIAGNOSIS — K22.70 BARRETT'S ESOPHAGUS WITHOUT DYSPLASIA: ICD-10-CM

## 2023-08-31 DIAGNOSIS — K57.30 DIVERTICULOSIS OF LARGE INTESTINE WITHOUT PERFORATION OR ABS: ICD-10-CM

## 2023-08-31 PROCEDURE — 99458 RPM TX MGMT EA ADDL 20 MIN: CPT | Performed by: INTERNAL MEDICINE

## 2023-08-31 PROCEDURE — 99457 RPM TX MGMT 1ST 20 MIN: CPT | Performed by: INTERNAL MEDICINE

## 2023-08-31 PROCEDURE — 99490 CHRNC CARE MGMT STAFF 1ST 20: CPT | Performed by: INTERNAL MEDICINE

## 2023-08-31 PROCEDURE — 99439 CHRNC CARE MGMT STAF EA ADDL: CPT | Performed by: INTERNAL MEDICINE

## 2023-08-31 PROCEDURE — 99454 REM MNTR PHYSIOL PARAM 16-30: CPT | Performed by: INTERNAL MEDICINE

## 2023-09-05 ENCOUNTER — HOSPITAL ENCOUNTER (OUTPATIENT)
Dept: MRI IMAGING | Facility: HOSPITAL | Age: 78
Discharge: HOME | End: 2023-09-05
Admitting: NEUROLOGICAL SURGERY
Payer: MEDICARE

## 2023-09-05 VITALS
OXYGEN SATURATION: 93 % | BODY MASS INDEX: 26.68 KG/M2 | RESPIRATION RATE: 16 BRPM | WEIGHT: 166 LBS | DIASTOLIC BLOOD PRESSURE: 59 MMHG | HEART RATE: 70 BPM | HEIGHT: 66 IN | TEMPERATURE: 98.4 F | SYSTOLIC BLOOD PRESSURE: 135 MMHG

## 2023-09-05 DIAGNOSIS — I67.1 CEREBRAL ANEURYSM, NONRUPTURED: ICD-10-CM

## 2023-09-05 PROCEDURE — 70544 MR ANGIOGRAPHY HEAD W/O DYE: CPT

## 2023-09-07 NOTE — PROGRESS NOTES
Subjective   History of Present Illness: Meme Morales is a 78 y.o. female is here today for follow-up on cerebral aneurysm. MRA head completed on 9/5/23.   She is doing well today.  No new complaints.  Denies any changes in the frequency or severity of her headaches.  Denies any changes in vision.  Denies any strokelike episodes.  Denies any changes in strength or sensation.      History of Present Illness    The following portions of the patient's history were reviewed and updated as appropriate: allergies, current medications, past family history, past medical history, past social history, past surgical history, and problem list.    Past Medical History:   Diagnosis Date    Acid reflux     Arthritis     Arthritis of carpometacarpal (CMC) joint of right thumb     Francisco esophagus     Basal cell carcinoma     Brain aneurysm     Burning with urination     Cancer     non malignant skin cancers    Cerebral aneurysm     Congestive heart failure     Depression     Difficulty swallowing     Disease of thyroid gland     HISTORY OF NODULES, SURGERY.     Encounter for Medicare annual wellness exam     Esophageal reflux     Exposure to chickenpox     H/O TB skin testing     WITH POSITIVE RESULT. NEG CXR.    Heart murmur     High blood pressure     History of hyperparathyroidism     History of migraine headaches     Hoarseness     Hypercalcemia     Hyperlipidemia     Hypertension     Hypothyroidism     Leaky heart valve     Low back pain     Osteopenia with high risk of fracture     Overweight with body mass index (BMI) of 25 to 25.9 in adult     Pacemaker     IMPLANTED LEFT SIDE    Palpitations     Pseudocholinesterase deficiency     AUNT ON FATHER'S SIDE OF FAMILY    Pulmonary hypertension     Rheumatic multiple valve disease     RLS (restless legs syndrome)     Rubella     Seasonal allergies     Sick sinus syndrome due to SA node dysfunction     REASON FOR PACEMAKER    Skin disorder     Slow to wake up after  anesthesia     Stage 3 chronic kidney disease     Symptoms of urinary tract infection     Thoracic outlet syndrome     Thyroid disease     Tinea cruris     Tricuspid valve regurgitation     Urinary hesitancy     Valvular heart disease     Vitamin D deficiency         Past Surgical History:   Procedure Laterality Date    CARDIAC CATHETERIZATION      Post-Proc Data:  Lesions successfully dilated     CATARACT EXTRACTION WITH INTRAOCULAR LENS IMPLANT      LEFT AND RIGHT    CEREBRAL ANGIOGRAM N/A 6/26/2019    Procedure: CEREBRAL ANGIOGRAM;  Surgeon: Ranjit Key MD;  Location: [facility] OR 18/19;  Service: Neurosurgery    COLONOSCOPY      CYST REMOVAL Right     REMOVED UNDER RIGHT ARM. BENIGN    EMBOLIZATION CEREBRAL N/A 11/26/2018    Procedure: Cerebral angiography and embolization of cerebral aneurysm with possible Neuroform stent;  Surgeon: Ranjit Key MD;  Location: [facility] OR 18/19;  Service: Neurosurgery    HYSTERECTOMY      PACEMAKER IMPLANTATION Left     [employer]    PARATHYROID GLAND SURGERY      ONE GLAND RIGHT    THYROIDECTOMY, PARTIAL Right     TONSILLECTOMY AND ADENOIDECTOMY      UPPER GASTROINTESTINAL ENDOSCOPY            Current Outpatient Medications:     Acetaminophen 500 MG capsule, Daily., Disp: , Rfl:     amLODIPine (NORVASC) 2.5 MG tablet, Take 1 tablet by mouth Daily., Disp: 30 tablet, Rfl: 5    ascorbic acid (VITAMIN C) 1000 MG tablet, Daily., Disp: , Rfl:     aspirin  MG tablet, Take 1 tablet by mouth Daily., Disp: 30 tablet, Rfl: 6    atorvastatin (LIPITOR) 40 MG tablet, TAKE 1 TABLET BY MOUTH  DAILY, Disp: 90 tablet, Rfl: 3    azelastine (ASTEPRO) 0.15 % solution nasal spray, 2 sprays into the nostril(s) as directed by provider 2 (Two) Times a Day. as directed, Disp: 120 mL, Rfl: 1    Cholecalciferol (VITAMIN D3) 2000 units tablet, Take 1 tablet by mouth Daily., Disp: , Rfl:     Cranberry 1000 MG capsule, Take  by mouth., Disp: , Rfl:     flunisolide  (NASALIDE) 25 MCG/ACT (0.025%) solution nasal spray, Inhale 2 sprays Every 12 (Twelve) Hours., Disp: 100 mL, Rfl: 1    hydroCHLOROthiazide (MICROZIDE) 12.5 MG capsule, Take 1 capsule by mouth Daily As Needed (swelling or weight gain)., Disp: 90 capsule, Rfl: 3    levothyroxine (SYNTHROID, LEVOTHROID) 75 MCG tablet, TAKE 1 TABLET BY MOUTH  DAILY, Disp: 90 tablet, Rfl: 3    lisinopril (PRINIVIL,ZESTRIL) 10 MG tablet, Take 1 tablet by mouth Daily., Disp: 90 tablet, Rfl: 3    montelukast (SINGULAIR) 10 MG tablet, TAKE 1 TABLET BY MOUTH  DAILY, Disp: 90 tablet, Rfl: 3    ondansetron ODT (ZOFRAN-ODT) 4 MG disintegrating tablet, , Disp: , Rfl:     pantoprazole (PROTONIX) 40 MG EC tablet, TAKE 1 TABLET BY MOUTH  DAILY, Disp: 90 tablet, Rfl: 3    polycarbophil (calcium polycarbophil) 625 MG tablet tablet, Every 6 (Six) Hours., Disp: , Rfl:     polycarbophil 625 MG tablet tablet, Take 1 tablet by mouth Daily., Disp: , Rfl:     rOPINIRole (REQUIP) 1 MG tablet, Take 1 tablet by mouth 3 (Three) Times a Day., Disp: 270 tablet, Rfl: 1    Turmeric Curcumin 500 MG capsule, Take  by mouth., Disp: , Rfl:      Allergies   Allergen Reactions    Codeine Other (See Comments)     HYPOTENTION    Morphine And Related Hallucinations        Social History     Socioeconomic History    Marital status:    Tobacco Use    Smoking status: Former     Packs/day: 1.50     Years: 25.00     Pack years: 37.50     Types: Cigarettes     Quit date:      Years since quittin.7    Smokeless tobacco: Never   Vaping Use    Vaping Use: Never used   Substance and Sexual Activity    Alcohol use: Yes     Comment: occasional    Drug use: No    Sexual activity: Defer        Family History   Problem Relation Age of Onset    Hypertension Mother     Heart disease Father     Stroke Father     Heart attack Father     Hypertension Father     Hypertension Sister     Stroke Maternal Grandmother     Diabetes type II Child     Hypertension Child     Malig  "Hyperthermia Neg Hx         Review of Systems   Eyes:  Negative for visual disturbance.   Neurological:  Negative for dizziness, speech difficulty, light-headedness and headaches.     Objective     Vitals:    23 1406   BP: 122/70   Pulse: 65   SpO2: 99%   Weight: 78.7 kg (173 lb 9.6 oz)   Height: 167.6 cm (66\")     Body mass index is 28.02 kg/m².      Physical Exam  Neurologic Exam  Awake, alert, oriented x3  Pupils equal round reactive to light  Extraocular muscles intact  Face symmetric  Speech is fluent and clear  No pronator drift  Motor exam  Bilateral deltoids 5/5, bilateral biceps 5/5, bilateral triceps 5/5, bilateral wrist extension 5/5 bilateral hand  5/5  Bilateral hip flexion 5/5, bilateral knee extension 5/5, bilateral DF/PF 5/5  No clonus  No Hattie's reflex  Steady normal gait  Able to detect  light touch in all 4 extremities        Assessment & Plan   Independent Review of Radiographic Studies:      I personally reviewed the images from the following studies.  MRA head from 2023  The MRA images show no evidence of aneurysm recurrence.  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  It was incidentally found after a ground-level fall with a closed head injury  -I will plan to have her follow-up in 2 years with a repeat MRA    Diagnoses and all orders for this visit:    1. Cerebral aneurysm, nonruptured (Primary)  -     MRI Angiogram Head Without Contrast; Future      Return in about 2 years (around 2025).    I spent 30 minutes reviewing the medical record, reviewing the MRI images, discussing the natural history of intracranial aneurysms, discussing the management strategies for intracranial aneurysms     "

## 2023-09-11 ENCOUNTER — OFFICE VISIT (OUTPATIENT)
Dept: NEUROSURGERY | Facility: CLINIC | Age: 78
End: 2023-09-11
Payer: MEDICARE

## 2023-09-11 VITALS
HEART RATE: 65 BPM | OXYGEN SATURATION: 99 % | HEIGHT: 66 IN | SYSTOLIC BLOOD PRESSURE: 122 MMHG | WEIGHT: 173.6 LBS | BODY MASS INDEX: 27.9 KG/M2 | DIASTOLIC BLOOD PRESSURE: 70 MMHG

## 2023-09-11 DIAGNOSIS — I67.1 CEREBRAL ANEURYSM, NONRUPTURED: Primary | ICD-10-CM

## 2023-09-11 PROCEDURE — 3078F DIAST BP <80 MM HG: CPT | Performed by: NEUROLOGICAL SURGERY

## 2023-09-11 PROCEDURE — 1159F MED LIST DOCD IN RCRD: CPT | Performed by: NEUROLOGICAL SURGERY

## 2023-09-11 PROCEDURE — 1160F RVW MEDS BY RX/DR IN RCRD: CPT | Performed by: NEUROLOGICAL SURGERY

## 2023-09-11 PROCEDURE — 99214 OFFICE O/P EST MOD 30 MIN: CPT | Performed by: NEUROLOGICAL SURGERY

## 2023-09-11 PROCEDURE — 3074F SYST BP LT 130 MM HG: CPT | Performed by: NEUROLOGICAL SURGERY

## 2023-09-21 RX ORDER — AMLODIPINE BESYLATE 2.5 MG/1
TABLET ORAL
Qty: 30 TABLET | Refills: 5 | Status: SHIPPED | OUTPATIENT
Start: 2023-09-21

## 2023-09-29 ENCOUNTER — OFFICE VISIT (OUTPATIENT)
Dept: FAMILY MEDICINE CLINIC | Facility: CLINIC | Age: 78
End: 2023-09-29
Payer: MEDICARE

## 2023-09-29 VITALS
OXYGEN SATURATION: 100 % | RESPIRATION RATE: 16 BRPM | SYSTOLIC BLOOD PRESSURE: 128 MMHG | WEIGHT: 166 LBS | HEART RATE: 65 BPM | DIASTOLIC BLOOD PRESSURE: 64 MMHG | BODY MASS INDEX: 26.68 KG/M2 | HEIGHT: 66 IN

## 2023-09-29 DIAGNOSIS — K21.9 GASTROESOPHAGEAL REFLUX DISEASE WITHOUT ESOPHAGITIS: ICD-10-CM

## 2023-09-29 DIAGNOSIS — R10.10 PAIN LOCALIZED TO UPPER ABDOMEN: ICD-10-CM

## 2023-09-29 DIAGNOSIS — E78.2 MIXED HYPERLIPIDEMIA: ICD-10-CM

## 2023-09-29 DIAGNOSIS — R10.84 COLICKY ABDOMINAL PAIN: Primary | ICD-10-CM

## 2023-09-29 PROCEDURE — 3074F SYST BP LT 130 MM HG: CPT | Performed by: NURSE PRACTITIONER

## 2023-09-29 PROCEDURE — 99213 OFFICE O/P EST LOW 20 MIN: CPT | Performed by: NURSE PRACTITIONER

## 2023-09-29 PROCEDURE — 3078F DIAST BP <80 MM HG: CPT | Performed by: NURSE PRACTITIONER

## 2023-09-29 PROCEDURE — 1159F MED LIST DOCD IN RCRD: CPT | Performed by: NURSE PRACTITIONER

## 2023-09-29 PROCEDURE — 1160F RVW MEDS BY RX/DR IN RCRD: CPT | Performed by: NURSE PRACTITIONER

## 2023-09-29 NOTE — PROGRESS NOTES
Subjective   Meme Morales is a 78 y.o. female.     History of Present Illness   Abdominal Pain (Has noticed it is worse with greasy foods, pain is mid to upper R side)  nausea (After meals x 3 weeks)  Diarrhea (Has resolved for now but this all started with diarrhea).  Bowel movements are now regular.   The following portions of the patient's history were reviewed and updated as appropriate: allergies, current medications, past family history, past medical history, past social history, past surgical history, and problem list.    Review of Systems   Constitutional:  Negative for chills, fever and unexpected weight change.   Respiratory:  Negative for shortness of breath.    Cardiovascular:  Negative for chest pain and palpitations.   Gastrointestinal:  Positive for abdominal pain, diarrhea and nausea. Negative for abdominal distention, anal bleeding, blood in stool, constipation, rectal pain and vomiting.   Psychiatric/Behavioral:  Negative for behavioral problems.      Objective   Physical Exam  Vitals and nursing note reviewed.   Constitutional:       Appearance: Normal appearance. She is well-developed.   Cardiovascular:      Rate and Rhythm: Normal rate and regular rhythm.   Pulmonary:      Effort: Pulmonary effort is normal.      Breath sounds: Normal breath sounds.   Abdominal:      General: Abdomen is flat. Bowel sounds are increased.      Tenderness:  in the right upper quadrant and epigastric area There is no guarding.       Neurological:      Mental Status: She is alert and oriented to person, place, and time.   Psychiatric:         Mood and Affect: Mood normal.         Behavior: Behavior normal.         Thought Content: Thought content normal.         Judgment: Judgment normal.       Assessment & Plan   Diagnoses and all orders for this visit:    1. Colicky abdominal pain (Primary)  -     Comprehensive metabolic panel  -     Lipid panel  -     CBC and Differential  -     TSH  -     Amylase  -      Lipase  -     T4, free  -     US Gallbladder    2. Pain localized to upper abdomen  -     Comprehensive metabolic panel  -     Lipid panel  -     CBC and Differential  -     TSH  -     Amylase  -     Lipase  -     T4, free  -     US Gallbladder    3. Gastroesophageal reflux disease without esophagitis  -     Comprehensive metabolic panel  -     Lipid panel  -     CBC and Differential  -     TSH  -     Amylase  -     Lipase  -     T4, free    4. Mixed hyperlipidemia  -     Comprehensive metabolic panel  -     Lipid panel  -     CBC and Differential  -     TSH  -     Amylase  -     Lipase  -     T4, free

## 2023-09-30 ENCOUNTER — OFFICE (AMBULATORY)
Dept: URBAN - METROPOLITAN AREA CLINIC 76 | Facility: CLINIC | Age: 78
End: 2023-09-30
Payer: MEDICARE

## 2023-09-30 DIAGNOSIS — E78.00 PURE HYPERCHOLESTEROLEMIA, UNSPECIFIED: ICD-10-CM

## 2023-09-30 DIAGNOSIS — K57.30 DIVERTICULOSIS OF LARGE INTESTINE WITHOUT PERFORATION OR ABS: ICD-10-CM

## 2023-09-30 DIAGNOSIS — K21.9 GASTRO-ESOPHAGEAL REFLUX DISEASE WITHOUT ESOPHAGITIS: ICD-10-CM

## 2023-09-30 DIAGNOSIS — E66.3 OVERWEIGHT: ICD-10-CM

## 2023-09-30 DIAGNOSIS — I10 ESSENTIAL (PRIMARY) HYPERTENSION: ICD-10-CM

## 2023-09-30 DIAGNOSIS — K22.70 BARRETT'S ESOPHAGUS WITHOUT DYSPLASIA: ICD-10-CM

## 2023-09-30 LAB
ALBUMIN SERPL-MCNC: 4.8 G/DL (ref 3.5–5.2)
ALBUMIN/GLOB SERPL: 1.9 G/DL
ALP SERPL-CCNC: 74 U/L (ref 39–117)
ALT SERPL-CCNC: 14 U/L (ref 1–33)
AMYLASE SERPL-CCNC: 84 U/L (ref 28–100)
AST SERPL-CCNC: 17 U/L (ref 1–32)
BASOPHILS # BLD AUTO: 0.07 10*3/MM3 (ref 0–0.2)
BASOPHILS NFR BLD AUTO: 0.9 % (ref 0–1.5)
BILIRUB SERPL-MCNC: 0.6 MG/DL (ref 0–1.2)
BUN SERPL-MCNC: 38 MG/DL (ref 8–23)
BUN/CREAT SERPL: 31.4 (ref 7–25)
CALCIUM SERPL-MCNC: 10.5 MG/DL (ref 8.6–10.5)
CHLORIDE SERPL-SCNC: 100 MMOL/L (ref 98–107)
CHOLEST SERPL-MCNC: 153 MG/DL (ref 0–200)
CO2 SERPL-SCNC: 25.1 MMOL/L (ref 22–29)
CREAT SERPL-MCNC: 1.21 MG/DL (ref 0.57–1)
EGFRCR SERPLBLD CKD-EPI 2021: 45.4 ML/MIN/1.73
EOSINOPHIL # BLD AUTO: 0.49 10*3/MM3 (ref 0–0.4)
EOSINOPHIL NFR BLD AUTO: 6.5 % (ref 0.3–6.2)
ERYTHROCYTE [DISTWIDTH] IN BLOOD BY AUTOMATED COUNT: 13 % (ref 12.3–15.4)
GLOBULIN SER CALC-MCNC: 2.5 GM/DL
GLUCOSE SERPL-MCNC: 99 MG/DL (ref 65–99)
HCT VFR BLD AUTO: 35.6 % (ref 34–46.6)
HDLC SERPL-MCNC: 69 MG/DL (ref 40–60)
HGB BLD-MCNC: 12 G/DL (ref 12–15.9)
IMM GRANULOCYTES # BLD AUTO: 0.02 10*3/MM3 (ref 0–0.05)
IMM GRANULOCYTES NFR BLD AUTO: 0.3 % (ref 0–0.5)
LDLC SERPL CALC-MCNC: 70 MG/DL (ref 0–100)
LIPASE SERPL-CCNC: 38 U/L (ref 13–60)
LYMPHOCYTES # BLD AUTO: 1.35 10*3/MM3 (ref 0.7–3.1)
LYMPHOCYTES NFR BLD AUTO: 17.8 % (ref 19.6–45.3)
MCH RBC QN AUTO: 30.9 PG (ref 26.6–33)
MCHC RBC AUTO-ENTMCNC: 33.7 G/DL (ref 31.5–35.7)
MCV RBC AUTO: 91.8 FL (ref 79–97)
MONOCYTES # BLD AUTO: 0.44 10*3/MM3 (ref 0.1–0.9)
MONOCYTES NFR BLD AUTO: 5.8 % (ref 5–12)
NEUTROPHILS # BLD AUTO: 5.2 10*3/MM3 (ref 1.7–7)
NEUTROPHILS NFR BLD AUTO: 68.7 % (ref 42.7–76)
NRBC BLD AUTO-RTO: 0 /100 WBC (ref 0–0.2)
PLATELET # BLD AUTO: 263 10*3/MM3 (ref 140–450)
POTASSIUM SERPL-SCNC: 4.9 MMOL/L (ref 3.5–5.2)
PROT SERPL-MCNC: 7.3 G/DL (ref 6–8.5)
RBC # BLD AUTO: 3.88 10*6/MM3 (ref 3.77–5.28)
SODIUM SERPL-SCNC: 137 MMOL/L (ref 136–145)
T4 FREE SERPL-MCNC: 1.61 NG/DL (ref 0.93–1.7)
TRIGL SERPL-MCNC: 70 MG/DL (ref 0–150)
TSH SERPL DL<=0.005 MIU/L-ACNC: 2.24 UIU/ML (ref 0.27–4.2)
VLDLC SERPL CALC-MCNC: 14 MG/DL (ref 5–40)
WBC # BLD AUTO: 7.57 10*3/MM3 (ref 3.4–10.8)

## 2023-09-30 PROCEDURE — 99454 REM MNTR PHYSIOL PARAM 16-30: CPT | Performed by: INTERNAL MEDICINE

## 2023-09-30 PROCEDURE — 99487 CPLX CHRNC CARE 1ST 60 MIN: CPT | Performed by: INTERNAL MEDICINE

## 2023-09-30 PROCEDURE — 99489 CPLX CHRNC CARE EA ADDL 30: CPT | Performed by: INTERNAL MEDICINE

## 2023-09-30 PROCEDURE — 99457 RPM TX MGMT 1ST 20 MIN: CPT | Performed by: INTERNAL MEDICINE

## 2023-10-09 DIAGNOSIS — E03.9 ACQUIRED HYPOTHYROIDISM: ICD-10-CM

## 2023-10-09 DIAGNOSIS — E78.2 MIXED HYPERLIPIDEMIA: ICD-10-CM

## 2023-10-09 RX ORDER — LEVOTHYROXINE SODIUM 0.07 MG/1
75 TABLET ORAL DAILY
Qty: 90 TABLET | Refills: 3 | Status: SHIPPED | OUTPATIENT
Start: 2023-10-09

## 2023-10-09 RX ORDER — ATORVASTATIN CALCIUM 40 MG/1
40 TABLET, FILM COATED ORAL DAILY
Qty: 90 TABLET | Refills: 3 | Status: SHIPPED | OUTPATIENT
Start: 2023-10-09

## 2023-10-09 NOTE — TELEPHONE ENCOUNTER
Caller: Andrew Meme S    Relationship: Self    Best call back number: [phone]     Requested Prescriptions:   Requested Prescriptions     Pending Prescriptions Disp Refills    levothyroxine (SYNTHROID, LEVOTHROID) 75 MCG tablet 90 tablet 3     Sig: Take 1 tablet by mouth Daily.    atorvastatin (LIPITOR) 40 MG tablet 90 tablet 3     Sig: Take 1 tablet by mouth Daily.        Pharmacy where request should be sent: [address] [phone] [facility] [phone]      Last office visit with prescribing clinician: 9/29/2023   Last telemedicine visit with prescribing clinician: Visit date not found   Next office visit with prescribing clinician: Visit date not found     Additional details provided by patient: PATIENT IS NEEDING NEW PRESCRIPTIONS    Does the patient have less than a 3 day supply:  [] Yes  [x] No    Would you like a call back once the refill request has been completed: [] Yes [x] No    If the office needs to give you a call back, can they leave a voicemail: [] Yes [x] No    Matty Loco Rep   10/09/23 10:36 EDT

## 2023-10-11 ENCOUNTER — HOSPITAL ENCOUNTER (OUTPATIENT)
Facility: HOSPITAL | Age: 78
Discharge: HOME | End: 2023-10-11
Admitting: NURSE PRACTITIONER
Payer: MEDICARE

## 2023-10-11 PROCEDURE — 76705 ECHO EXAM OF ABDOMEN: CPT

## 2023-10-18 ENCOUNTER — TELEPHONE (OUTPATIENT)
Dept: FAMILY MEDICINE CLINIC | Facility: CLINIC | Age: 78
End: 2023-10-18
Payer: MEDICARE

## 2023-10-18 NOTE — TELEPHONE ENCOUNTER
Caller: Meme Morales    Relationship: Self    Best call back number: [phone]     Who are you requesting to speak with (clinical staff, provider,  specific staff member): CLINICAL STAFF    What was the call regarding: PATIENT STATES THAT SHE HAD DISCUSSED A HIDA SCAN TO FURTHER EXAM ONGOING STOMACH ISSUES. REQUESTS A CALL BACK TO FOLLOW UP ON STATUS OF SCAN

## 2023-10-18 NOTE — TELEPHONE ENCOUNTER
Pt advised that referral is placed and in a pending status. Pt advised that centralized scheduling will call to [facility]. Pt verbalized understanding.

## 2023-10-31 ENCOUNTER — OFFICE (AMBULATORY)
Dept: URBAN - METROPOLITAN AREA CLINIC 76 | Facility: CLINIC | Age: 78
End: 2023-10-31
Payer: MEDICARE

## 2023-10-31 ENCOUNTER — HOSPITAL ENCOUNTER (OUTPATIENT)
Dept: NUCLEAR MEDICINE | Facility: HOSPITAL | Age: 78
Discharge: HOME | End: 2023-10-31
Payer: MEDICARE

## 2023-10-31 DIAGNOSIS — K21.9 GASTRO-ESOPHAGEAL REFLUX DISEASE WITHOUT ESOPHAGITIS: ICD-10-CM

## 2023-10-31 DIAGNOSIS — I10 ESSENTIAL (PRIMARY) HYPERTENSION: ICD-10-CM

## 2023-10-31 DIAGNOSIS — K22.70 BARRETT'S ESOPHAGUS WITHOUT DYSPLASIA: ICD-10-CM

## 2023-10-31 DIAGNOSIS — E66.3 OVERWEIGHT: ICD-10-CM

## 2023-10-31 DIAGNOSIS — K57.30 DIVERTICULOSIS OF LARGE INTESTINE WITHOUT PERFORATION OR ABS: ICD-10-CM

## 2023-10-31 DIAGNOSIS — E78.00 PURE HYPERCHOLESTEROLEMIA, UNSPECIFIED: ICD-10-CM

## 2023-10-31 PROCEDURE — 99439 CHRNC CARE MGMT STAF EA ADDL: CPT | Performed by: INTERNAL MEDICINE

## 2023-10-31 PROCEDURE — 99458 RPM TX MGMT EA ADDL 20 MIN: CPT | Performed by: INTERNAL MEDICINE

## 2023-10-31 PROCEDURE — 0 TECHNETIUM TC 99M MEBROFENIN KIT: Performed by: NURSE PRACTITIONER

## 2023-10-31 PROCEDURE — A9537 TC99M MEBROFENIN: HCPCS | Performed by: NURSE PRACTITIONER

## 2023-10-31 PROCEDURE — 99457 RPM TX MGMT 1ST 20 MIN: CPT | Performed by: INTERNAL MEDICINE

## 2023-10-31 PROCEDURE — 99454 REM MNTR PHYSIOL PARAM 16-30: CPT | Performed by: INTERNAL MEDICINE

## 2023-10-31 PROCEDURE — 99490 CHRNC CARE MGMT STAFF 1ST 20: CPT | Performed by: INTERNAL MEDICINE

## 2023-10-31 PROCEDURE — 78227 HEPATOBIL SYST IMAGE W/DRUG: CPT

## 2023-10-31 RX ORDER — KIT FOR THE PREPARATION OF TECHNETIUM TC 99M MEBROFENIN 45 MG/10ML
1 INJECTION, POWDER, LYOPHILIZED, FOR SOLUTION INTRAVENOUS
Status: COMPLETED | OUTPATIENT
Start: 2023-10-31 | End: 2023-10-31

## 2023-10-31 RX ADMIN — MEBROFENIN 1 DOSE: 45 INJECTION, POWDER, LYOPHILIZED, FOR SOLUTION INTRAVENOUS at 08:03

## 2023-11-04 DIAGNOSIS — G25.81 RESTLESS LEGS SYNDROME (RLS): ICD-10-CM

## 2023-11-06 RX ORDER — ROPINIROLE 1 MG/1
1 TABLET, FILM COATED ORAL 3 TIMES DAILY
Qty: 270 TABLET | Refills: 1 | Status: SHIPPED | OUTPATIENT
Start: 2023-11-06

## 2023-11-07 ENCOUNTER — TELEPHONE (OUTPATIENT)
Dept: FAMILY MEDICINE CLINIC | Facility: CLINIC | Age: 78
End: 2023-11-07

## 2023-11-07 DIAGNOSIS — R10.10 PAIN LOCALIZED TO UPPER ABDOMEN: Primary | ICD-10-CM

## 2023-11-07 NOTE — TELEPHONE ENCOUNTER
Hub staff attempted to follow warm transfer process and was unsuccessful     Caller: Meme Morales    Relationship to patient: Self    Best call back number: [phone]    Patient is needing: THE PATIENT CALLED IN FOR THE RESULTS OF A RECENT SCAN SHE HAD DONE ON 10/31/2023. PLEASE ADVISE.

## 2023-11-07 NOTE — TELEPHONE ENCOUNTER
Pt aware of Hida scan results still having some abd discomfort wanted to know if there was any other testting that needed to be done or what

## 2023-11-09 NOTE — TELEPHONE ENCOUNTER
Recommend referral to gastroenterology for further workup. Referral has been placed and patient will be contacted for appt.

## 2023-11-20 ENCOUNTER — OFFICE (AMBULATORY)
Dept: URBAN - METROPOLITAN AREA CLINIC 76 | Facility: CLINIC | Age: 78
End: 2023-11-20

## 2023-11-20 VITALS
DIASTOLIC BLOOD PRESSURE: 73 MMHG | OXYGEN SATURATION: 98 % | SYSTOLIC BLOOD PRESSURE: 161 MMHG | DIASTOLIC BLOOD PRESSURE: 71 MMHG | HEIGHT: 68 IN | WEIGHT: 165 LBS | HEART RATE: 60 BPM | SYSTOLIC BLOOD PRESSURE: 162 MMHG

## 2023-11-20 DIAGNOSIS — K44.9 DIAPHRAGMATIC HERNIA WITHOUT OBSTRUCTION OR GANGRENE: ICD-10-CM

## 2023-11-20 DIAGNOSIS — R68.81 EARLY SATIETY: ICD-10-CM

## 2023-11-20 DIAGNOSIS — R11.0 NAUSEA: ICD-10-CM

## 2023-11-20 DIAGNOSIS — K22.70 BARRETT'S ESOPHAGUS WITHOUT DYSPLASIA: ICD-10-CM

## 2023-11-20 PROCEDURE — 99214 OFFICE O/P EST MOD 30 MIN: CPT | Performed by: NURSE PRACTITIONER

## 2023-11-20 RX ORDER — FUROSEMIDE 40 MG/1
TABLET ORAL
Refills: 0 | Status: COMPLETED
End: 2023-11-20

## 2023-11-20 RX ORDER — PANTOPRAZOLE 40 MG/1
40 TABLET, DELAYED RELEASE ORAL
Qty: 60 | Refills: 5 | Status: ACTIVE
Start: 2023-05-30

## 2023-11-27 RX ORDER — HYDROCHLOROTHIAZIDE 12.5 MG/1
12.5 CAPSULE, GELATIN COATED ORAL DAILY
Qty: 90 CAPSULE | Refills: 3 | Status: SHIPPED | OUTPATIENT
Start: 2023-11-27

## 2023-11-30 ENCOUNTER — OFFICE (AMBULATORY)
Dept: URBAN - METROPOLITAN AREA CLINIC 76 | Facility: CLINIC | Age: 78
End: 2023-11-30
Payer: MEDICARE

## 2023-11-30 DIAGNOSIS — K22.70 BARRETT'S ESOPHAGUS WITHOUT DYSPLASIA: ICD-10-CM

## 2023-11-30 DIAGNOSIS — E66.3 OVERWEIGHT: ICD-10-CM

## 2023-11-30 DIAGNOSIS — K57.30 DIVERTICULOSIS OF LARGE INTESTINE WITHOUT PERFORATION OR ABS: ICD-10-CM

## 2023-11-30 DIAGNOSIS — E78.00 PURE HYPERCHOLESTEROLEMIA, UNSPECIFIED: ICD-10-CM

## 2023-11-30 DIAGNOSIS — K21.00 GASTRO-ESOPHAGEAL REFLUX DISEASE WITH ESOPHAGITIS, WITHOUT B: ICD-10-CM

## 2023-11-30 DIAGNOSIS — I10 ESSENTIAL (PRIMARY) HYPERTENSION: ICD-10-CM

## 2023-11-30 PROCEDURE — 99490 CHRNC CARE MGMT STAFF 1ST 20: CPT | Performed by: INTERNAL MEDICINE

## 2023-12-06 ENCOUNTER — TRANSCRIBE ORDERS (OUTPATIENT)
Dept: ADMINISTRATIVE | Facility: HOSPITAL | Age: 78
End: 2023-12-06
Payer: MEDICARE

## 2023-12-06 DIAGNOSIS — R11.0 NAUSEA: ICD-10-CM

## 2023-12-06 DIAGNOSIS — R68.81 EARLY SATIETY: Primary | ICD-10-CM

## 2023-12-06 DIAGNOSIS — K22.70 BARRETT'S ESOPHAGUS WITHOUT DYSPLASIA: ICD-10-CM

## 2023-12-06 DIAGNOSIS — K44.9 DIAPHRAGMATIC HERNIA WITHOUT OBSTRUCTION OR GANGRENE: ICD-10-CM

## 2024-03-01 ENCOUNTER — CLINICAL SUPPORT NO REQUIREMENTS (OUTPATIENT)
Age: 79
End: 2024-03-01
Payer: MEDICARE

## 2024-03-01 ENCOUNTER — OFFICE VISIT (OUTPATIENT)
Age: 79
End: 2024-03-01
Payer: MEDICARE

## 2024-03-01 VITALS
SYSTOLIC BLOOD PRESSURE: 140 MMHG | BODY MASS INDEX: 26.52 KG/M2 | HEIGHT: 66 IN | WEIGHT: 165 LBS | HEART RATE: 64 BPM | DIASTOLIC BLOOD PRESSURE: 82 MMHG

## 2024-03-01 DIAGNOSIS — I49.5 SICK SINUS SYNDROME DUE TO SA NODE DYSFUNCTION: ICD-10-CM

## 2024-03-01 DIAGNOSIS — I34.0 NONRHEUMATIC MITRAL VALVE REGURGITATION: ICD-10-CM

## 2024-03-01 DIAGNOSIS — R09.89 RIGHT CAROTID BRUIT: Chronic | ICD-10-CM

## 2024-03-01 DIAGNOSIS — I07.1 TRICUSPID VALVE INSUFFICIENCY, UNSPECIFIED ETIOLOGY: ICD-10-CM

## 2024-03-01 DIAGNOSIS — E78.2 MIXED HYPERLIPIDEMIA: Chronic | ICD-10-CM

## 2024-03-01 DIAGNOSIS — I47.19 ATRIAL TACHYCARDIA: Primary | Chronic | ICD-10-CM

## 2024-03-01 DIAGNOSIS — I38 VALVULAR HEART DISEASE: Chronic | ICD-10-CM

## 2024-03-01 DIAGNOSIS — I10 ESSENTIAL HYPERTENSION: Chronic | ICD-10-CM

## 2024-03-01 DIAGNOSIS — Z95.0 PRESENCE OF CARDIAC PACEMAKER: Chronic | ICD-10-CM

## 2024-03-01 DIAGNOSIS — I49.5 SICK SINUS SYNDROME DUE TO SA NODE DYSFUNCTION: Primary | ICD-10-CM

## 2024-03-01 NOTE — PROGRESS NOTES
Subjective:     Encounter Date: 03/01/2024      Patient ID: Meme Morales is a 78 y.o. female.    Chief Complaint:follow up HTN, SSS  History of Present Illness  This is a 79 y/o female who follows with Dr. Steve and is known to me. She has a pmhx of hypertension, hyperlipidemia, sick sinus syndrome s/p [employer] PPM, cerebral aneurysm s/p coiling, chronic kidney disease, mild mitral and moderate tricuspid regurgitation. She also has thoracic outlet syndrome secondary to cervical rib.     She is here today for a follow up visit. She still has some shortness of breath if she is in a hurry or going up stairs. This is overall stable. She did report that after a bit of getting used to it, the settings adjustments on the pacemaker did help some. She reports palpitations which are not new and largely stable. She denies any dizziness or syncope. She denies any significant swelling, orthopnea or PND. She is taking the HCTZ daily and this has helped with the swelling. BP is stable.    Prior history:  She was previously a patient of Dr. Conner and was last seen her in June 2022. She was not doing well at the time. Dr. Jain had changed her amlodipine to lisinopril/HCTZ and she was having episodes of SBP in the 70s. She had started taking only half a tablet prior to being seen in the office. This seemed to help so she was instructed to continue to monitor her pressures. She also had complaints of shortness of breath so an echocardiogram and nuclear stress test were ordered. Echocardiogram showed an EF of 62%, grade 1 diastolic dysfunction, mild mitral regurgitation, moderate tricuspid regurgitation and mildly elevated RVSP. Nuclear stress test showed a small-sized, mild to moderately severe area of ischemia in the apex, hyperdynamic EF > 70%, and considered a low risk study.    I saw her in December and she was having issues with hypotension. She requested to take lisinopril and HCTZ separately, using the  HCTZ on an as needed basis. We tried this for a week. She reported back with her BP readings which were in the 150-160-80s. I instructed her to start taking the HCTZ daily. She then saw Dr. Steve in March 2023. She was having complaints of dyspnea on exertion. Dr. Steve discussed with the patient further work-up vs working on BP control. She opted to work on BP. She had not been taking HCTZ at the time so Dr. Steve continued her on lisinopril and added amlodipine 2.5 mg. Daily.    When I saw her in follow up, her BP had improved. She reported some chest pain and shortness of breath along with pretty profound fatigue. We opted to proceed with a stress test which showed no evidence of ischemia.     She last saw Dr. Steve in August 2023 and was doing well overall. She did report continued dyspnea on exertion which was largely stable. Her pacemaker was interrogated and they changed the accleratometer threshold to help with her shortness of breath.     I have reviewed and updated as appropriate allergies, current medications, past family history, past medical history, past surgical history and problem list.    Review of Systems   Constitutional: Positive for malaise/fatigue. Negative for fever, weight gain and weight loss.   HENT:  Negative for congestion, hoarse voice and sore throat.    Eyes:  Negative for blurred vision and double vision.   Cardiovascular:  Positive for dyspnea on exertion and palpitations. Negative for chest pain, leg swelling, orthopnea and syncope.   Respiratory:  Negative for cough and wheezing.    Gastrointestinal:  Negative for abdominal pain, hematemesis, hematochezia and melena.   Genitourinary:  Negative for dysuria and hematuria.   Neurological:  Negative for dizziness, headaches, light-headedness and numbness.   Psychiatric/Behavioral:  Negative for depression. The patient is not nervous/anxious.          Current Outpatient Medications:     Acetaminophen 500 MG capsule, Daily., Disp: , Rfl:      amLODIPine (NORVASC) 2.5 MG tablet, TAKE ONE TABLET BY MOUTH DAILY, Disp: 30 tablet, Rfl: 5    ascorbic acid (VITAMIN C) 1000 MG tablet, Daily., Disp: , Rfl:     aspirin  MG tablet, Take 1 tablet by mouth Daily., Disp: 30 tablet, Rfl: 6    atorvastatin (LIPITOR) 40 MG tablet, Take 1 tablet by mouth Daily., Disp: 90 tablet, Rfl: 3    azelastine (ASTEPRO) 0.15 % solution nasal spray, 2 sprays into the nostril(s) as directed by provider 2 (Two) Times a Day. as directed, Disp: 120 mL, Rfl: 1    Cholecalciferol (VITAMIN D3) 2000 units tablet, Take 1 tablet by mouth Daily., Disp: , Rfl:     hydroCHLOROthiazide (MICROZIDE) 12.5 MG capsule, TAKE 1 CAPSULE BY MOUTH DAILY, Disp: 90 capsule, Rfl: 3    levothyroxine (SYNTHROID, LEVOTHROID) 75 MCG tablet, Take 1 tablet by mouth Daily., Disp: 90 tablet, Rfl: 3    lisinopril (PRINIVIL,ZESTRIL) 10 MG tablet, Take 1 tablet by mouth Daily., Disp: 90 tablet, Rfl: 3    ondansetron ODT (ZOFRAN-ODT) 4 MG disintegrating tablet, , Disp: , Rfl:     pantoprazole (PROTONIX) 40 MG EC tablet, TAKE 1 TABLET BY MOUTH  DAILY (Patient taking differently: Take 1 tablet by mouth Daily. 2 tabs a day), Disp: 90 tablet, Rfl: 3    rOPINIRole (REQUIP) 1 MG tablet, TAKE ONE TABLET BY MOUTH THREE TIMES A DAY, Disp: 270 tablet, Rfl: 1    Turmeric Curcumin 500 MG capsule, Take  by mouth., Disp: , Rfl:     Past Medical History:   Diagnosis Date    Acid reflux     Arthritis     Arthritis of carpometacarpal (CMC) joint of right thumb     Francisco esophagus     Basal cell carcinoma     Brain aneurysm     Burning with urination     Cancer     non malignant skin cancers    Cerebral aneurysm     Congestive heart failure     Depression     Difficulty swallowing     Disease of thyroid gland     HISTORY OF NODULES, SURGERY.     Encounter for Medicare annual wellness exam     Esophageal reflux     Exposure to chickenpox     H/O TB skin testing     WITH POSITIVE RESULT. NEG CXR.    Heart murmur     High blood  pressure     History of hyperparathyroidism     History of migraine headaches     Hoarseness     Hypercalcemia     Hyperlipidemia     Hypertension     Hypothyroidism     Leaky heart valve     Low back pain     Osteopenia with high risk of fracture     Overweight with body mass index (BMI) of 25 to 25.9 in adult     Pacemaker     IMPLANTED LEFT SIDE    Palpitations     Pseudocholinesterase deficiency     AUNT ON FATHER'S SIDE OF FAMILY    Pulmonary hypertension     Rheumatic multiple valve disease     RLS (restless legs syndrome)     Rubella     Seasonal allergies     Sick sinus syndrome due to SA node dysfunction     REASON FOR PACEMAKER    Skin disorder     Slow to wake up after anesthesia     Stage 3 chronic kidney disease     Symptoms of urinary tract infection     Thoracic outlet syndrome     Thyroid disease     Tinea cruris     Tricuspid valve regurgitation     Urinary hesitancy     Valvular heart disease     Vitamin D deficiency        Past Surgical History:   Procedure Laterality Date    CARDIAC CATHETERIZATION      Post-Proc Data:  Lesions successfully dilated     CATARACT EXTRACTION WITH INTRAOCULAR LENS IMPLANT      LEFT AND RIGHT    CEREBRAL ANGIOGRAM N/A 6/26/2019    Procedure: CEREBRAL ANGIOGRAM;  Surgeon: Ranjit Key MD;  Location: [facility] OR 18/19;  Service: Neurosurgery    COLONOSCOPY      CYST REMOVAL Right     REMOVED UNDER RIGHT ARM. BENIGN    EMBOLIZATION CEREBRAL N/A 11/26/2018    Procedure: Cerebral angiography and embolization of cerebral aneurysm with possible Neuroform stent;  Surgeon: Ranjit Key MD;  Location: [facility] OR 18/19;  Service: Neurosurgery    HYSTERECTOMY      PACEMAKER IMPLANTATION Left     [employer]    PARATHYROID GLAND SURGERY      ONE GLAND RIGHT    THYROIDECTOMY, PARTIAL Right     TONSILLECTOMY AND ADENOIDECTOMY      UPPER GASTROINTESTINAL ENDOSCOPY         Family History   Problem Relation Age of Onset    Hypertension Mother     Heart  "disease Father     Stroke Father     Heart attack Father     Hypertension Father     Hypertension Sister     Stroke Maternal Grandmother     Diabetes type II Child     Hypertension Child     Malig Hyperthermia Neg Hx        Social History     Tobacco Use    Smoking status: Former     Packs/day: 1.50     Years: 25.00     Additional pack years: 0.00     Total pack years: 37.50     Types: Cigarettes     Quit date:      Years since quittin.1    Smokeless tobacco: Never   Vaping Use    Vaping Use: Never used   Substance Use Topics    Alcohol use: Yes     Comment: occasional    Drug use: No         ECG 12 Lead    Date/Time: 3/1/2024 11:54 AM  Performed by: Angie Singer APRN    Authorized by: Angie Singer APRN  Comparison: compared with previous ECG from 2023  Rhythm: paced  Conduction: incomplete right bundle branch block and left anterior fascicular block             Objective:     Visit Vitals  /82   Pulse 64   Ht 167.6 cm (66\")   Wt 74.8 kg (165 lb)   BMI 26.63 kg/m²               Physical Exam  Constitutional:       Appearance: Normal appearance. She is normal weight.   HENT:      Head: Normocephalic.   Neck:      Vascular: No carotid bruit.   Cardiovascular:      Rate and Rhythm: Normal rate and regular rhythm.      Chest Wall: PMI is not displaced.      Pulses: Normal pulses.           Radial pulses are 2+ on the right side and 2+ on the left side.        Posterior tibial pulses are 2+ on the right side and 2+ on the left side.      Heart sounds: Normal heart sounds. No murmur heard.     No friction rub. No gallop.   Pulmonary:      Effort: Pulmonary effort is normal.      Breath sounds: Normal breath sounds.   Abdominal:      General: Bowel sounds are normal. There is no distension.      Palpations: Abdomen is soft.   Musculoskeletal:      Right lower leg: No edema.      Left lower leg: No edema.   Skin:     General: Skin is warm and dry.      Capillary Refill: Capillary refill takes less " than 2 seconds.   Neurological:      Mental Status: She is alert and oriented to person, place, and time.   Psychiatric:         Mood and Affect: Mood normal.         Behavior: Behavior normal.         Thought Content: Thought content normal.          Lab Review:   Lipid Panel          9/29/2023    11:04   Lipid Panel   Total Cholesterol 153    Triglycerides 70    HDL Cholesterol 69    VLDL Cholesterol 14    LDL Cholesterol  70          Cardiac Procedures:   Echocardiogram 6/17/22:  Calculated left ventricular EF = 62% Estimated left ventricular EF was in agreement with the calculated left ventricular EF.  Left ventricular diastolic function is consistent with (grade I) impaired relaxation.  The right ventricular cavity is mildly dilated.  Left atrial volume is moderately increased.  Saline test results are negative.  There is mild, bileaflet mitral valve thickening present.  Mild mitral valve regurgitation is present.  Moderate tricuspid valve regurgitation is present.  Estimated right ventricular systolic pressure from tricuspid regurgitation is mildly elevated (35-45 mmHg).     Exercise stress perfusion study 6/17/22:  Breast attenuation artifact is present.  Myocardial perfusion imaging indicates a small-sized, mild-to-moderately severe area of ischemia located in the apex.  Left ventricular ejection fraction is hyperdynamic (Calculated EF > 70%). .  Impressions are consistent with a low risk study.      Assessment:         Diagnoses and all orders for this visit:    1. Atrial tachycardia (Primary)    2. Essential hypertension    3. Nonrheumatic mitral valve regurgitation    4. Mixed hyperlipidemia    5. Presence of cardiac pacemaker    6. Right carotid bruit    7. Sick sinus syndrome due to SA node dysfunction    8. Tricuspid valve insufficiency, unspecified etiology    9. Valvular heart disease    Other orders  -     ECG 12 Lead            Plan:       Dyspnea on exertion: largely stable. Normal stress test in  June 2023. Echocardiogram in June 2022 was pretty unremarkable as well. Will monitor for worsening dyspnea.  HTN: blood pressure has improved on current regimen. No changes.  HLD on atorvastatin. Goal LDL < 100.  SSS s/p PPM: [employer] PPM. Device interrogation reviewed and was overall normal today.  VHD: mild MR and moderate TR per echocardiogram in June 2022. Compensated. Will continue to monitor.    Thank you for allowing me to participate in this patient's care. Please call with any questions or concerns. Ms. Morales will follow up with Dr. Steve in 6 months.          Your medication list            Accurate as of March 1, 2024 11:55 AM. If you have any questions, ask your nurse or doctor.                CHANGE how you take these medications        Instructions Last Dose Given Next Dose Due   pantoprazole 40 MG EC tablet  Commonly known as: PROTONIX  What changed: additional instructions      TAKE 1 TABLET BY MOUTH  DAILY              CONTINUE taking these medications        Instructions Last Dose Given Next Dose Due   Acetaminophen 500 MG capsule      Daily.       amLODIPine 2.5 MG tablet  Commonly known as: NORVASC      TAKE ONE TABLET BY MOUTH DAILY       ascorbic acid 1000 MG tablet  Commonly known as: VITAMIN C      Daily.       aspirin 325 MG EC tablet      Take 1 tablet by mouth Daily.       atorvastatin 40 MG tablet  Commonly known as: LIPITOR      Take 1 tablet by mouth Daily.       azelastine 0.15 % solution nasal spray  Commonly known as: ASTEPRO      2 sprays into the nostril(s) as directed by provider 2 (Two) Times a Day. as directed       hydroCHLOROthiazide 12.5 MG capsule  Commonly known as: MICROZIDE      TAKE 1 CAPSULE BY MOUTH DAILY       levothyroxine 75 MCG tablet  Commonly known as: SYNTHROID, LEVOTHROID      Take 1 tablet by mouth Daily.       lisinopril 10 MG tablet  Commonly known as: PRINIVIL,ZESTRIL      Take 1 tablet by mouth Daily.       ondansetron ODT 4 MG disintegrating  tablet  Commonly known as: ZOFRAN-ODT           rOPINIRole 1 MG tablet  Commonly known as: REQUIP      TAKE ONE TABLET BY MOUTH THREE TIMES A DAY       Turmeric Curcumin 500 MG capsule      Take  by mouth.       Vitamin D3 50 MCG (2000 UT) tablet      Take 1 tablet by mouth Daily.                  Angie Singer, LINWOOD  03/01/24  9:49 AM EST

## 2024-03-14 RX ORDER — AMLODIPINE BESYLATE 2.5 MG/1
2.5 TABLET ORAL DAILY
Qty: 90 TABLET | Refills: 1 | Status: SHIPPED | OUTPATIENT
Start: 2024-03-14

## 2024-03-15 ENCOUNTER — HOSPITAL ENCOUNTER (OUTPATIENT)
Dept: NUCLEAR MEDICINE | Facility: HOSPITAL | Age: 79
Discharge: HOME | End: 2024-03-15
Payer: MEDICARE

## 2024-03-15 DIAGNOSIS — K44.9 DIAPHRAGMATIC HERNIA WITHOUT OBSTRUCTION OR GANGRENE: ICD-10-CM

## 2024-03-15 DIAGNOSIS — R11.0 NAUSEA: ICD-10-CM

## 2024-03-15 DIAGNOSIS — K22.70 BARRETT'S ESOPHAGUS WITHOUT DYSPLASIA: ICD-10-CM

## 2024-03-15 DIAGNOSIS — R68.81 EARLY SATIETY: ICD-10-CM

## 2024-03-29 ENCOUNTER — HOSPITAL ENCOUNTER (OUTPATIENT)
Dept: NUCLEAR MEDICINE | Facility: HOSPITAL | Age: 79
Discharge: HOME | End: 2024-03-29
Payer: MEDICARE

## 2024-03-29 PROCEDURE — 0 TECHNETIUM SULFUR COLLOID: Performed by: NURSE PRACTITIONER

## 2024-03-29 PROCEDURE — A9541 TC99M SULFUR COLLOID: HCPCS | Performed by: NURSE PRACTITIONER

## 2024-03-29 PROCEDURE — 78264 GASTRIC EMPTYING IMG STUDY: CPT

## 2024-03-29 RX ADMIN — TECHNETIUM TC 99M SULFUR COLLOID 1 DOSE: KIT at 07:38

## 2024-04-15 ENCOUNTER — OFFICE (AMBULATORY)
Dept: URBAN - METROPOLITAN AREA CLINIC 76 | Facility: CLINIC | Age: 79
End: 2024-04-15

## 2024-04-15 VITALS
OXYGEN SATURATION: 95 % | WEIGHT: 165 LBS | DIASTOLIC BLOOD PRESSURE: 80 MMHG | HEIGHT: 68 IN | HEART RATE: 60 BPM | SYSTOLIC BLOOD PRESSURE: 128 MMHG

## 2024-04-15 DIAGNOSIS — K21.9 GASTRO-ESOPHAGEAL REFLUX DISEASE WITHOUT ESOPHAGITIS: ICD-10-CM

## 2024-04-15 DIAGNOSIS — R10.11 RIGHT UPPER QUADRANT PAIN: ICD-10-CM

## 2024-04-15 DIAGNOSIS — K82.8 OTHER SPECIFIED DISEASES OF GALLBLADDER: ICD-10-CM

## 2024-04-15 DIAGNOSIS — R68.81 EARLY SATIETY: ICD-10-CM

## 2024-04-15 DIAGNOSIS — K22.70 BARRETT'S ESOPHAGUS WITHOUT DYSPLASIA: ICD-10-CM

## 2024-04-15 PROCEDURE — 99214 OFFICE O/P EST MOD 30 MIN: CPT

## 2024-04-15 RX ORDER — OMEPRAZOLE 40 MG/1
80 CAPSULE, DELAYED RELEASE ORAL
Qty: 180 | Refills: 4 | Status: ACTIVE
Start: 2024-04-15

## 2024-04-15 RX ORDER — FAMOTIDINE 40 MG/1
40 TABLET, FILM COATED ORAL
Qty: 90 | Refills: 3 | Status: ACTIVE
Start: 2024-04-15

## 2024-04-17 RX ORDER — LISINOPRIL 10 MG/1
10 TABLET ORAL DAILY
Qty: 90 TABLET | Refills: 3 | Status: SHIPPED | OUTPATIENT
Start: 2024-04-17

## 2024-04-30 DIAGNOSIS — G25.81 RESTLESS LEGS SYNDROME (RLS): ICD-10-CM

## 2024-05-06 RX ORDER — ROPINIROLE 1 MG/1
1 TABLET, FILM COATED ORAL 3 TIMES DAILY
Qty: 90 TABLET | Refills: 0 | Status: SHIPPED | OUTPATIENT
Start: 2024-05-06

## 2024-05-30 DIAGNOSIS — G25.81 RESTLESS LEGS SYNDROME (RLS): ICD-10-CM

## 2024-05-31 RX ORDER — ROPINIROLE 1 MG/1
1 TABLET, FILM COATED ORAL 3 TIMES DAILY
Qty: 30 TABLET | Refills: 0 | Status: SHIPPED | OUTPATIENT
Start: 2024-05-31

## 2024-06-07 DIAGNOSIS — G25.81 RESTLESS LEGS SYNDROME (RLS): ICD-10-CM

## 2024-06-18 DIAGNOSIS — G25.81 RESTLESS LEGS SYNDROME (RLS): ICD-10-CM

## 2024-06-18 RX ORDER — ROPINIROLE 1 MG/1
1 TABLET, FILM COATED ORAL 3 TIMES DAILY
Qty: 30 TABLET | Refills: 0 | Status: CANCELLED | OUTPATIENT
Start: 2024-06-18

## 2024-06-18 NOTE — TELEPHONE ENCOUNTER
Caller: Meme Morales    Relationship: Self    Best call back number: [phone]     Requested Prescriptions:   Requested Prescriptions     Pending Prescriptions Disp Refills    rOPINIRole (REQUIP) 1 MG tablet 30 tablet 0     Sig: Take 1 tablet by mouth 3 (Three) Times a Day.        Pharmacy where request should be sent: [facility] [id number] [address] [facility] [phone] [facility] [phone] FX     Last office visit with prescribing clinician: 9/29/2023   Last telemedicine visit with prescribing clinician: Visit date not found   Next office visit with prescribing clinician: Visit date not found     Additional details provided by patient: WILL NEED NEW PRESCRIPTION WITH 90 DAY SUPPLY    Does the patient have less than a 3 day supply:  [x] Yes  [] No    Would you like a call back once the refill request has been completed: [] Yes [] No    If the office needs to give you a call back, can they leave a voicemail: [] Yes [] No    Matty Kc Rep   06/18/24 11:31 EDT

## 2024-06-19 ENCOUNTER — TELEPHONE (OUTPATIENT)
Dept: FAMILY MEDICINE CLINIC | Facility: CLINIC | Age: 79
End: 2024-06-19
Payer: MEDICARE

## 2024-06-19 NOTE — TELEPHONE ENCOUNTER
Caller: Meme Morales    Relationship: Self    Best call back number: [phone]    What medication are you requesting:   rOPINIRole (REQUIP) 1 MG tablet --270 TABLETS  -TAKE 1 TABLET BY MOUTH 3 TIMES A DAY     --ASKING FOR 90 DAY SUPPLY    Have you had these symptoms before:    [x] Yes  [] No    Have you been treated for these symptoms before:   [x] Yes  [] No    If a prescription is needed, what is your preferred pharmacy and phone number: [facility] [id number] - [address] [phone] [facility] [phone] FX     Additional notes:    LAST PRESCRIPTION WAS ONLY FOR 10 DAYS.

## 2024-06-19 NOTE — TELEPHONE ENCOUNTER
Left patient a VM informing her that she will need to make an appointment to see her provider in order to get an 90 day supply of requested medication. Patient has not been in office 9/2023.   HUB TO RELAY

## 2024-06-20 ENCOUNTER — OFFICE VISIT (OUTPATIENT)
Dept: FAMILY MEDICINE CLINIC | Facility: CLINIC | Age: 79
End: 2024-06-20
Payer: MEDICARE

## 2024-06-20 VITALS
HEIGHT: 66 IN | SYSTOLIC BLOOD PRESSURE: 120 MMHG | WEIGHT: 166 LBS | RESPIRATION RATE: 16 BRPM | BODY MASS INDEX: 26.68 KG/M2 | HEART RATE: 70 BPM | DIASTOLIC BLOOD PRESSURE: 62 MMHG | OXYGEN SATURATION: 98 %

## 2024-06-20 DIAGNOSIS — E78.2 MIXED HYPERLIPIDEMIA: ICD-10-CM

## 2024-06-20 DIAGNOSIS — E03.9 ACQUIRED HYPOTHYROIDISM: ICD-10-CM

## 2024-06-20 DIAGNOSIS — J30.1 CHRONIC ALLERGIC RHINITIS DUE TO POLLEN: ICD-10-CM

## 2024-06-20 DIAGNOSIS — G25.81 RESTLESS LEGS SYNDROME (RLS): ICD-10-CM

## 2024-06-20 PROCEDURE — 99214 OFFICE O/P EST MOD 30 MIN: CPT | Performed by: NURSE PRACTITIONER

## 2024-06-20 PROCEDURE — 1160F RVW MEDS BY RX/DR IN RCRD: CPT | Performed by: NURSE PRACTITIONER

## 2024-06-20 PROCEDURE — 1159F MED LIST DOCD IN RCRD: CPT | Performed by: NURSE PRACTITIONER

## 2024-06-20 PROCEDURE — 3074F SYST BP LT 130 MM HG: CPT | Performed by: NURSE PRACTITIONER

## 2024-06-20 PROCEDURE — 3078F DIAST BP <80 MM HG: CPT | Performed by: NURSE PRACTITIONER

## 2024-06-20 RX ORDER — AZELASTINE HCL 205.5 UG/1
2 SPRAY NASAL 2 TIMES DAILY
Qty: 120 ML | Refills: 3 | Status: SHIPPED | OUTPATIENT
Start: 2024-06-20 | End: 2024-06-20 | Stop reason: SDUPTHER

## 2024-06-20 RX ORDER — ATORVASTATIN CALCIUM 40 MG/1
40 TABLET, FILM COATED ORAL DAILY
Qty: 90 TABLET | Refills: 3 | Status: SHIPPED | OUTPATIENT
Start: 2024-06-20 | End: 2024-06-20 | Stop reason: SDUPTHER

## 2024-06-20 RX ORDER — ONDANSETRON 4 MG/1
4 TABLET, FILM COATED ORAL
COMMUNITY

## 2024-06-20 RX ORDER — LEVOTHYROXINE SODIUM 0.07 MG/1
75 TABLET ORAL DAILY
Qty: 90 TABLET | Refills: 3 | Status: SHIPPED | OUTPATIENT
Start: 2024-06-20 | End: 2024-06-20 | Stop reason: SDUPTHER

## 2024-06-20 RX ORDER — ROPINIROLE 1 MG/1
1 TABLET, FILM COATED ORAL 3 TIMES DAILY
Qty: 30 TABLET | Refills: 0 | OUTPATIENT
Start: 2024-06-20

## 2024-06-20 RX ORDER — OMEPRAZOLE 40 MG/1
40 CAPSULE, DELAYED RELEASE ORAL 2 TIMES DAILY
COMMUNITY

## 2024-06-20 RX ORDER — ATORVASTATIN CALCIUM 40 MG/1
40 TABLET, FILM COATED ORAL DAILY
Qty: 90 TABLET | Refills: 3 | Status: SHIPPED | OUTPATIENT
Start: 2024-06-20

## 2024-06-20 RX ORDER — ROPINIROLE 1 MG/1
1 TABLET, FILM COATED ORAL 3 TIMES DAILY
Qty: 270 TABLET | Refills: 3 | Status: SHIPPED | OUTPATIENT
Start: 2024-06-20

## 2024-06-20 RX ORDER — LEVOTHYROXINE SODIUM 0.07 MG/1
75 TABLET ORAL DAILY
Qty: 90 TABLET | Refills: 3 | Status: SHIPPED | OUTPATIENT
Start: 2024-06-20

## 2024-06-20 RX ORDER — FAMOTIDINE 40 MG/1
TABLET, FILM COATED ORAL
COMMUNITY
Start: 2024-04-15

## 2024-06-20 RX ORDER — AZELASTINE HCL 205.5 UG/1
2 SPRAY NASAL 2 TIMES DAILY
Qty: 120 ML | Refills: 3 | Status: SHIPPED | OUTPATIENT
Start: 2024-06-20

## 2024-06-20 NOTE — PROGRESS NOTES
"Subjective   Meme Morales is a 79 y.o. female.     Hypothyroidism  Pertinent negatives include no chest pain.       Since the last visit, she has overall felt well.  She has Primary Hypertension and well controlled on current medication and hyperlipidemia and hypothyroidism and is due for labs to assess control.  She is not fasting today so we will schedule appointment for labs.  She is up-to-date with her nephrologist for CKD and just had labs done within the last month.  She has restless leg and is taking ropinirole 3 times a day as she does have symptoms during the day as well as in the evening.  She does not feel that the medication is working as well as it was previously and is going to start doing some stretching and chair yoga to see if this helps.  She has GERD and her pantoprazole was discontinued by gastroenterology and she was started on omeprazole 40 mg twice a day and famotidine 40 mg every evening.  She feels this is working well for her so far.  In light of her increased restless leg symptoms and long-term PPI use, may have low magnesium .  she has been compliant with current medications have reviewed them.  The patient denies medication side effects.  Will refill medications. /62   Pulse 70   Resp 16   Ht 167.6 cm (66\")   Wt 75.3 kg (166 lb)   SpO2 98%   BMI 26.79 kg/m² .          Results for orders placed or performed in visit on 09/29/23   Comprehensive metabolic panel    Specimen: Blood   Result Value Ref Range    Glucose 99 65 - 99 mg/dL    BUN 38 (H) 8 - 23 mg/dL    Creatinine 1.21 (H) 0.57 - 1.00 mg/dL    EGFR Result 45.4 (L) >60.0 mL/min/1.73    BUN/Creatinine Ratio 31.4 (H) 7.0 - 25.0    Sodium 137 136 - 145 mmol/L    Potassium 4.9 3.5 - 5.2 mmol/L    Chloride 100 98 - 107 mmol/L    Total CO2 25.1 22.0 - 29.0 mmol/L    Calcium 10.5 8.6 - 10.5 mg/dL    Total Protein 7.3 6.0 - 8.5 g/dL    Albumin 4.8 3.5 - 5.2 g/dL    Globulin 2.5 gm/dL    A/G Ratio 1.9 g/dL    Total Bilirubin " 0.6 0.0 - 1.2 mg/dL    Alkaline Phosphatase 74 39 - 117 U/L    AST (SGOT) 17 1 - 32 U/L    ALT (SGPT) 14 1 - 33 U/L   Lipid panel    Specimen: Blood   Result Value Ref Range    Total Cholesterol 153 0 - 200 mg/dL    Triglycerides 70 0 - 150 mg/dL    HDL Cholesterol 69 (H) 40 - 60 mg/dL    VLDL Cholesterol Pancho 14 5 - 40 mg/dL    LDL Chol Calc (NIH) 70 0 - 100 mg/dL   TSH    Specimen: Blood   Result Value Ref Range    TSH 2.240 0.270 - 4.200 uIU/mL   Amylase    Specimen: Blood   Result Value Ref Range    Amylase 84 28 - 100 U/L   Lipase    Specimen: Blood   Result Value Ref Range    Lipase 38 13 - 60 U/L   T4, free    Specimen: Blood   Result Value Ref Range    Free T4 1.61 0.93 - 1.70 ng/dL   CBC and Differential    Specimen: Blood   Result Value Ref Range    WBC 7.57 3.40 - 10.80 10*3/mm3    RBC 3.88 3.77 - 5.28 10*6/mm3    Hemoglobin 12.0 12.0 - 15.9 g/dL    Hematocrit 35.6 34.0 - 46.6 %    MCV 91.8 79.0 - 97.0 fL    MCH 30.9 26.6 - 33.0 pg    MCHC 33.7 31.5 - 35.7 g/dL    RDW 13.0 12.3 - 15.4 %    Platelets 263 140 - 450 10*3/mm3    Neutrophil Rel % 68.7 42.7 - 76.0 %    Lymphocyte Rel % 17.8 (L) 19.6 - 45.3 %    Monocyte Rel % 5.8 5.0 - 12.0 %    Eosinophil Rel % 6.5 (H) 0.3 - 6.2 %    Basophil Rel % 0.9 0.0 - 1.5 %    Neutrophils Absolute 5.20 1.70 - 7.00 10*3/mm3    Lymphocytes Absolute 1.35 0.70 - 3.10 10*3/mm3    Monocytes Absolute 0.44 0.10 - 0.90 10*3/mm3    Eosinophils Absolute 0.49 (H) 0.00 - 0.40 10*3/mm3    Basophils Absolute 0.07 0.00 - 0.20 10*3/mm3    Immature Granulocyte Rel % 0.3 0.0 - 0.5 %    Immature Grans Absolute 0.02 0.00 - 0.05 10*3/mm3    nRBC 0.0 0.0 - 0.2 /100 WBC         The following portions of the patient's history were reviewed and updated as appropriate: allergies, current medications, past family history, past medical history, past social history, past surgical history, and problem list.    Review of Systems   Constitutional:  Negative for unexpected weight change.   Respiratory:   Negative for shortness of breath.    Cardiovascular:  Negative for chest pain and palpitations.   Psychiatric/Behavioral:  Negative for behavioral problems.        Objective   Physical Exam  Vitals and nursing note reviewed.   Constitutional:       Appearance: Normal appearance. She is well-developed.   Neck:      Vascular: No carotid bruit.   Cardiovascular:      Rate and Rhythm: Normal rate and regular rhythm.   Pulmonary:      Effort: Pulmonary effort is normal.      Breath sounds: Normal breath sounds.   Neurological:      Mental Status: She is alert and oriented to person, place, and time.   Psychiatric:         Mood and Affect: Mood normal.         Behavior: Behavior normal.         Thought Content: Thought content normal.         Judgment: Judgment normal.         Assessment & Plan   Diagnoses and all orders for this visit:    1. Mixed hyperlipidemia  -     Discontinue: atorvastatin (LIPITOR) 40 MG tablet; Take 1 tablet by mouth Daily.  Dispense: 90 tablet; Refill: 3  -     Comprehensive metabolic panel  -     Lipid panel  -     CBC and Differential  -     TSH  -     T4, free  -     T3, free  -     Magnesium  -     atorvastatin (LIPITOR) 40 MG tablet; Take 1 tablet by mouth Daily.  Dispense: 90 tablet; Refill: 3    2. Chronic allergic rhinitis due to pollen  -     Discontinue: azelastine (ASTEPRO) 0.15 % solution nasal spray; 2 sprays into the nostril(s) as directed by provider 2 (Two) Times a Day. as directed  Dispense: 120 mL; Refill: 3  -     azelastine (ASTEPRO) 0.15 % solution nasal spray; 2 sprays into the nostril(s) as directed by provider 2 (Two) Times a Day. as directed  Dispense: 120 mL; Refill: 3    3. Acquired hypothyroidism  -     Discontinue: levothyroxine (SYNTHROID, LEVOTHROID) 75 MCG tablet; Take 1 tablet by mouth Daily.  Dispense: 90 tablet; Refill: 3  -     Comprehensive metabolic panel  -     Lipid panel  -     CBC and Differential  -     TSH  -     T4, free  -     T3, free  -      Magnesium  -     levothyroxine (SYNTHROID, LEVOTHROID) 75 MCG tablet; Take 1 tablet by mouth Daily.  Dispense: 90 tablet; Refill: 3    4. Restless legs syndrome (RLS)  -     rOPINIRole (REQUIP) 1 MG tablet; Take 1 tablet by mouth 3 (Three) Times a Day.  Dispense: 270 tablet; Refill: 3  -     Comprehensive metabolic panel  -     Lipid panel  -     CBC and Differential  -     TSH  -     T4, free  -     T3, free  -     Magnesium        Will check magnesium with her labs but encourage patient to go ahead and start taking over-the-counter magnesium supplement at bedtime.

## 2024-06-25 LAB
ALBUMIN SERPL-MCNC: 4.3 G/DL (ref 3.7–4.7)
ALP SERPL-CCNC: 82 IU/L (ref 44–121)
ALT SERPL-CCNC: 16 IU/L (ref 0–32)
AST SERPL-CCNC: 18 IU/L (ref 0–40)
BASOPHILS # BLD AUTO: 0.1 X10E3/UL (ref 0–0.2)
BASOPHILS NFR BLD AUTO: 1 %
BILIRUB SERPL-MCNC: 0.4 MG/DL (ref 0–1.2)
BUN SERPL-MCNC: 28 MG/DL (ref 8–27)
BUN/CREAT SERPL: 25 (ref 12–28)
CALCIUM SERPL-MCNC: 9.9 MG/DL (ref 8.7–10.3)
CHLORIDE SERPL-SCNC: 102 MMOL/L (ref 96–106)
CHOLEST SERPL-MCNC: 156 MG/DL (ref 100–199)
CO2 SERPL-SCNC: 24 MMOL/L (ref 20–29)
CREAT SERPL-MCNC: 1.1 MG/DL (ref 0.57–1)
EGFRCR SERPLBLD CKD-EPI 2021: 50 ML/MIN/1.73
EOSINOPHIL # BLD AUTO: 0.7 X10E3/UL (ref 0–0.4)
EOSINOPHIL NFR BLD AUTO: 11 %
ERYTHROCYTE [DISTWIDTH] IN BLOOD BY AUTOMATED COUNT: 12.4 % (ref 11.7–15.4)
GLOBULIN SER CALC-MCNC: 2.4 G/DL (ref 1.5–4.5)
GLUCOSE SERPL-MCNC: 97 MG/DL (ref 70–99)
HCT VFR BLD AUTO: 33.4 % (ref 34–46.6)
HDLC SERPL-MCNC: 79 MG/DL
HGB BLD-MCNC: 10.9 G/DL (ref 11.1–15.9)
IMM GRANULOCYTES # BLD AUTO: 0 X10E3/UL (ref 0–0.1)
IMM GRANULOCYTES NFR BLD AUTO: 0 %
LDLC SERPL CALC-MCNC: 66 MG/DL (ref 0–99)
LYMPHOCYTES # BLD AUTO: 1.4 X10E3/UL (ref 0.7–3.1)
LYMPHOCYTES NFR BLD AUTO: 22 %
MAGNESIUM SERPL-MCNC: 2.4 MG/DL (ref 1.6–2.3)
MCH RBC QN AUTO: 30.4 PG (ref 26.6–33)
MCHC RBC AUTO-ENTMCNC: 32.6 G/DL (ref 31.5–35.7)
MCV RBC AUTO: 93 FL (ref 79–97)
MONOCYTES # BLD AUTO: 0.5 X10E3/UL (ref 0.1–0.9)
MONOCYTES NFR BLD AUTO: 8 %
NEUTROPHILS # BLD AUTO: 3.8 X10E3/UL (ref 1.4–7)
NEUTROPHILS NFR BLD AUTO: 58 %
PLATELET # BLD AUTO: 259 X10E3/UL (ref 150–450)
POTASSIUM SERPL-SCNC: 5.3 MMOL/L (ref 3.5–5.2)
PROT SERPL-MCNC: 6.7 G/DL (ref 6–8.5)
RBC # BLD AUTO: 3.58 X10E6/UL (ref 3.77–5.28)
SODIUM SERPL-SCNC: 138 MMOL/L (ref 134–144)
T3FREE SERPL-MCNC: 2.5 PG/ML (ref 2–4.4)
T4 FREE SERPL-MCNC: 1.41 NG/DL (ref 0.82–1.77)
TRIGL SERPL-MCNC: 55 MG/DL (ref 0–149)
TSH SERPL DL<=0.005 MIU/L-ACNC: 1.87 UIU/ML (ref 0.45–4.5)
VLDLC SERPL CALC-MCNC: 11 MG/DL (ref 5–40)
WBC # BLD AUTO: 6.5 X10E3/UL (ref 3.4–10.8)

## 2024-07-01 DIAGNOSIS — D64.9 ANEMIA, UNSPECIFIED TYPE: Primary | ICD-10-CM

## 2024-07-01 DIAGNOSIS — E87.5 HYPERKALEMIA: ICD-10-CM

## 2024-07-01 RX ORDER — FERROUS SULFATE 325(65) MG
325 TABLET ORAL
Qty: 90 TABLET | Refills: 3 | Status: SHIPPED | OUTPATIENT
Start: 2024-07-01

## 2024-07-02 ENCOUNTER — TELEPHONE (OUTPATIENT)
Dept: FAMILY MEDICINE CLINIC | Facility: CLINIC | Age: 79
End: 2024-07-02
Payer: MEDICARE

## 2024-07-02 DIAGNOSIS — E87.5 HYPERKALEMIA: ICD-10-CM

## 2024-07-02 DIAGNOSIS — D64.9 ANEMIA, UNSPECIFIED TYPE: ICD-10-CM

## 2024-07-02 NOTE — TELEPHONE ENCOUNTER
Hub staff attempted to follow warm transfer process and was unsuccessful     Caller: Meme Morales    Relationship to patient: Self    Best call back number: [phone]    Patient is needing: PLEASE ADVISE PATIENT IF SHE SHOULD START FASTING FOR HER LABS AT 1:15 TODAY. HUB COULD NOT WARM TRANSFER.

## 2024-07-02 NOTE — TELEPHONE ENCOUNTER
Spoke with patient to inform her that the requested lab doesn't require her to fast. Patient understanding and had no further question.

## 2024-07-03 ENCOUNTER — TELEPHONE (OUTPATIENT)
Dept: FAMILY MEDICINE CLINIC | Facility: CLINIC | Age: 79
End: 2024-07-03
Payer: MEDICARE

## 2024-07-03 DIAGNOSIS — D64.9 ANEMIA, UNSPECIFIED TYPE: Primary | ICD-10-CM

## 2024-07-03 LAB
ERYTHROCYTE [DISTWIDTH] IN BLOOD BY AUTOMATED COUNT: 12.3 % (ref 12.3–15.4)
FERRITIN SERPL-MCNC: 241 NG/ML (ref 13–150)
HCT VFR BLD AUTO: 33.9 % (ref 34–46.6)
HGB BLD-MCNC: 10.7 G/DL (ref 12–15.9)
IRON SERPL-MCNC: 64 MCG/DL (ref 37–145)
MCH RBC QN AUTO: 30.2 PG (ref 26.6–33)
MCHC RBC AUTO-ENTMCNC: 31.6 G/DL (ref 31.5–35.7)
MCV RBC AUTO: 95.8 FL (ref 79–97)
PLATELET # BLD AUTO: 244 10*3/MM3 (ref 140–450)
POTASSIUM SERPL-SCNC: 5.2 MMOL/L (ref 3.5–5.2)
RBC # BLD AUTO: 3.54 10*6/MM3 (ref 3.77–5.28)
WBC # BLD AUTO: 8.15 10*3/MM3 (ref 3.4–10.8)

## 2024-07-03 NOTE — TELEPHONE ENCOUNTER
Caller: Meme Morales    Relationship: Self    Best call back number: [phone]     What test was performed: BLOOD WORK    When was the test performed: 7/2/24    Additional notes: PATIENT ID REQUESTING THE RESULTS    PLEASE ADVISE

## 2024-07-05 NOTE — TELEPHONE ENCOUNTER
Repeat potassium is back within recommended range.  Iron level is on the lower end of the normal range.  Continue iron supplement and we will recheck levels in 4 weeks.

## 2024-07-05 NOTE — TELEPHONE ENCOUNTER
Spoke with patient to inform her of her test results per her provider Roula Alvares  Repeat potassium is back within recommended range.  Iron level is on the lower end of the normal range.  Continue iron supplement and we will recheck levels in 4 weeks. Patient voiced understanding and had no further question.

## 2024-07-10 ENCOUNTER — TELEPHONE (OUTPATIENT)
Dept: FAMILY MEDICINE CLINIC | Facility: CLINIC | Age: 79
End: 2024-07-10
Payer: MEDICARE

## 2024-07-10 NOTE — TELEPHONE ENCOUNTER
----- Message from Roula Alvares sent at 7/8/2024  4:18 PM EDT -----  Hemoglobin level continues to decrease.  Iron level is on the lower end of normal and ferritin is elevated which is a reactive response to the anemia.  Recommend to continue oral iron supplement that was sent to the pharmacy.  Recommend patient do 3 fecal occult blood test from 3 separate bowel movements to check for blood in the stool.

## 2024-07-10 NOTE — TELEPHONE ENCOUNTER
Name: Meme Morales      Relationship: Self      Best Callback Number: [phone]       HUB PROVIDED THE RELAY MESSAGE FROM THE OFFICE      PATIENT: HAS FURTHER QUESTIONS AND WOULD LIKE A CALL BACK AT THE FOLLOWING PHONE NUMBER     ADDITIONAL INFORMATION:  PATIENT STATED THAT SHE IS UNABLE TO TAKE IRON DUE TO A CONDITION SHE HAS. PLEASE CALL PATIENT TO DISCUSS

## 2024-07-16 ENCOUNTER — OFFICE VISIT (OUTPATIENT)
Dept: FAMILY MEDICINE CLINIC | Facility: CLINIC | Age: 79
End: 2024-07-16
Payer: MEDICARE

## 2024-07-16 VITALS
DIASTOLIC BLOOD PRESSURE: 58 MMHG | SYSTOLIC BLOOD PRESSURE: 130 MMHG | RESPIRATION RATE: 16 BRPM | OXYGEN SATURATION: 96 % | BODY MASS INDEX: 26.68 KG/M2 | HEART RATE: 62 BPM | WEIGHT: 166 LBS | HEIGHT: 66 IN

## 2024-07-16 DIAGNOSIS — D64.9 ANEMIA, UNSPECIFIED TYPE: Primary | ICD-10-CM

## 2024-07-16 PROCEDURE — 99213 OFFICE O/P EST LOW 20 MIN: CPT | Performed by: NURSE PRACTITIONER

## 2024-07-16 PROCEDURE — 1160F RVW MEDS BY RX/DR IN RCRD: CPT | Performed by: NURSE PRACTITIONER

## 2024-07-16 PROCEDURE — 3078F DIAST BP <80 MM HG: CPT | Performed by: NURSE PRACTITIONER

## 2024-07-16 PROCEDURE — 1159F MED LIST DOCD IN RCRD: CPT | Performed by: NURSE PRACTITIONER

## 2024-07-16 PROCEDURE — 3075F SYST BP GE 130 - 139MM HG: CPT | Performed by: NURSE PRACTITIONER

## 2024-07-16 RX ORDER — FERROUS SULFATE 325(65) MG
325 TABLET ORAL
Qty: 90 TABLET | Refills: 3 | Status: SHIPPED | OUTPATIENT
Start: 2024-07-16

## 2024-07-18 ENCOUNTER — SPECIMEN COLLECTION (OUTPATIENT)
Dept: FAMILY MEDICINE CLINIC | Facility: CLINIC | Age: 79
End: 2024-07-18
Payer: MEDICARE

## 2024-07-18 DIAGNOSIS — D64.9 ANEMIA, UNSPECIFIED TYPE: Primary | ICD-10-CM

## 2024-07-18 LAB
EXPIRATION DATE 2: NORMAL
EXPIRATION DATE 3: NORMAL
EXPIRATION DATE: NORMAL
GASTROCULT GAST QL: NEGATIVE
HEMOCCULT SP2 STL QL: NEGATIVE
HEMOCCULT SP3 STL QL: NEGATIVE
Lab: NORMAL

## 2024-07-18 PROCEDURE — 82274 ASSAY TEST FOR BLOOD FECAL: CPT | Performed by: NURSE PRACTITIONER

## 2024-07-25 ENCOUNTER — OFFICE (AMBULATORY)
Dept: URBAN - METROPOLITAN AREA CLINIC 76 | Facility: CLINIC | Age: 79
End: 2024-07-25
Payer: MEDICARE

## 2024-07-25 VITALS
SYSTOLIC BLOOD PRESSURE: 134 MMHG | HEART RATE: 59 BPM | OXYGEN SATURATION: 98 % | WEIGHT: 166 LBS | DIASTOLIC BLOOD PRESSURE: 80 MMHG | HEIGHT: 68 IN

## 2024-07-25 DIAGNOSIS — R10.11 RIGHT UPPER QUADRANT PAIN: ICD-10-CM

## 2024-07-25 DIAGNOSIS — D50.9 IRON DEFICIENCY ANEMIA, UNSPECIFIED: ICD-10-CM

## 2024-07-25 DIAGNOSIS — R93.3 ABNORMAL FINDINGS ON DIAGNOSTIC IMAGING OF OTHER PARTS OF DI: ICD-10-CM

## 2024-07-25 DIAGNOSIS — K22.70 BARRETT'S ESOPHAGUS WITHOUT DYSPLASIA: ICD-10-CM

## 2024-07-25 DIAGNOSIS — K82.8 OTHER SPECIFIED DISEASES OF GALLBLADDER: ICD-10-CM

## 2024-07-25 DIAGNOSIS — K21.9 GASTRO-ESOPHAGEAL REFLUX DISEASE WITHOUT ESOPHAGITIS: ICD-10-CM

## 2024-07-25 DIAGNOSIS — R13.10 DYSPHAGIA, UNSPECIFIED: ICD-10-CM

## 2024-07-25 DIAGNOSIS — R11.0 NAUSEA: ICD-10-CM

## 2024-07-25 PROCEDURE — 99214 OFFICE O/P EST MOD 30 MIN: CPT

## 2024-08-07 ENCOUNTER — TELEPHONE (OUTPATIENT)
Age: 79
End: 2024-08-07

## 2024-08-07 NOTE — TELEPHONE ENCOUNTER
REQUEST FOR CARDIAC CLEARANCE    Caller name: Meme Alcocerrachel     Phone Number: [phone]    Surgeon's name: DR. PEREZ    Type of planned surgery: ENDOSCOPY AND COLONOSCOPY    Date of planned surgery: NOT SCHEDULED YET    Type of anesthesia: UNSURE    Have you been experiencing chest pain or shortness of breath? NO    Is your doctor requesting for you to stop any of your medications prior to your surgery? YES,    Where should we fax the clearance to? PT DOESN'T HAVE FAX, PHONE NUMBER [phone]

## 2024-08-08 ENCOUNTER — TELEPHONE (OUTPATIENT)
Dept: FAMILY MEDICINE CLINIC | Facility: CLINIC | Age: 79
End: 2024-08-08
Payer: MEDICARE

## 2024-08-08 NOTE — TELEPHONE ENCOUNTER
Caller: Meme Morales    Relationship: Self    Best call back number: [phone]    What is the best time to reach you: ANYTIME    Who are you requesting to speak with (clinical staff, provider,  specific staff member): LINWOOD FRITZ    What was the call regarding: PATIENT STATED THAT SINCE HER LABS SHOWED HER HEMOGLOBIN DECREASED AND SHE IS NOW TAKING IRON SHE IS WANTING TO KNOW IF LINWOOD FRITZ WANTS HER TO COME IN TO GET HER IRON LEVELS CHECKED PLEASE ADVISE.

## 2024-08-08 NOTE — TELEPHONE ENCOUNTER
Spoke with patient to inform her that lab order were placed by her provider. Lab appt was scheduled.

## 2024-08-08 NOTE — TELEPHONE ENCOUNTER
Please let her know that she is okay to proceed from my standpoint.  I think Dr. Ty's office will send a clearance form once the endoscopy is scheduled.

## 2024-08-12 ENCOUNTER — TELEPHONE (OUTPATIENT)
Age: 79
End: 2024-08-12

## 2024-08-12 NOTE — TELEPHONE ENCOUNTER
REQUEST FOR CARDIAC CLEARANCE    Caller name: Meme Morales     Phone Number: [phone]    Surgeon's name: DR. NEYMAR PEREZ    Type of planned surgery: NOT SCHEDULED YET    Have you been experiencing chest pain or shortness of breath? NO    Is your doctor requesting for you to stop any of your medications prior to your surgery? NOT SURE. PATIENT ALSO HAS PACEMAKER    Where should we fax the clearance to? NOT SURE. PHONE NUMBER IS  [phone]. CALL TO GET FAX NUMBER. HUB COULD NOT LOCATE. PROVIDER TOLD PATIENT HE NEEDS A COPY OF CARDIAC CLEARANCE.

## 2024-08-15 ENCOUNTER — TELEPHONE (OUTPATIENT)
Dept: FAMILY MEDICINE CLINIC | Facility: CLINIC | Age: 79
End: 2024-08-15
Payer: MEDICARE

## 2024-08-15 NOTE — TELEPHONE ENCOUNTER
Patient was notified that her lab does not required for her to fast. Patient voiced understanding and had no further question

## 2024-08-15 NOTE — TELEPHONE ENCOUNTER
Caller: Meme Morales    Relationship: Self    Best call back number: [phone]     Who are you requesting to speak with (clinical staff, provider,  specific staff member): CLINICAL STAFF    What was the call regarding: PATIENT CALLED TO ASK IF SHE NEEDED TO BE FASTING FOR HER LABS TOMORROW.  PLEASE CALL AND ADVISE

## 2024-08-27 ENCOUNTER — TELEPHONE (OUTPATIENT)
Dept: FAMILY MEDICINE CLINIC | Facility: CLINIC | Age: 79
End: 2024-08-27
Payer: MEDICARE

## 2024-08-27 NOTE — TELEPHONE ENCOUNTER
Caller: Meme Morales    Relationship: Self    Best call back number: [phone]     Which medication are you concerned about: IRON SUPPLEMENT 325 MG (ferrous sulfate 325 (65 FE) MG tablet )    What are your concerns: PATIENT STATED THIS MEDICATION CAUSES HER TO EXPERIENCE CONSTIPATION AND BLACK STOOL.    PATIENT STATED SHE HAS A COLONOSCOPY SCHEDULED FOR 09-.    PATIENT IS REQUESTING TO KNOW IF SHE SHOULD STOP TAKING THIS MEDICATION PRIOR TO HER COLONOSCOPY, OR NOT, AS SHE IS CONCERNED THIS MAY CAUSE ISSUES.    PLEASE CALL TO DISCUSS AND ADVISE.

## 2024-08-28 DIAGNOSIS — J30.1 CHRONIC ALLERGIC RHINITIS DUE TO POLLEN: ICD-10-CM

## 2024-08-29 RX ORDER — FLUNISOLIDE 0.25 MG/ML
SOLUTION NASAL
Qty: 100 ML | Refills: 1 | Status: SHIPPED | OUTPATIENT
Start: 2024-08-29

## 2024-08-29 NOTE — TELEPHONE ENCOUNTER
She will likely need to hold it prior to colonoscopy but should contact the office doing the colonoscopy to see when she should hold it.

## 2024-08-29 NOTE — TELEPHONE ENCOUNTER
Spoke with patient to inform her per her provider   She will likely need to hold it prior to   colonoscopy but should contact the office   doing the colonoscopy to see when she   should hold it.    Patient voiced understanding and had no further question

## 2024-08-30 VITALS
SYSTOLIC BLOOD PRESSURE: 106 MMHG | SYSTOLIC BLOOD PRESSURE: 106 MMHG | SYSTOLIC BLOOD PRESSURE: 109 MMHG | SYSTOLIC BLOOD PRESSURE: 130 MMHG | HEART RATE: 64 BPM | DIASTOLIC BLOOD PRESSURE: 64 MMHG | HEART RATE: 65 BPM | SYSTOLIC BLOOD PRESSURE: 104 MMHG | DIASTOLIC BLOOD PRESSURE: 49 MMHG | SYSTOLIC BLOOD PRESSURE: 134 MMHG | HEART RATE: 78 BPM | OXYGEN SATURATION: 92 % | DIASTOLIC BLOOD PRESSURE: 72 MMHG | DIASTOLIC BLOOD PRESSURE: 39 MMHG | OXYGEN SATURATION: 93 % | SYSTOLIC BLOOD PRESSURE: 130 MMHG | RESPIRATION RATE: 4 BRPM | RESPIRATION RATE: 31 BRPM | DIASTOLIC BLOOD PRESSURE: 46 MMHG | RESPIRATION RATE: 34 BRPM | SYSTOLIC BLOOD PRESSURE: 134 MMHG | SYSTOLIC BLOOD PRESSURE: 97 MMHG | RESPIRATION RATE: 4 BRPM | DIASTOLIC BLOOD PRESSURE: 56 MMHG | OXYGEN SATURATION: 92 % | SYSTOLIC BLOOD PRESSURE: 126 MMHG | SYSTOLIC BLOOD PRESSURE: 104 MMHG | HEART RATE: 65 BPM | HEART RATE: 63 BPM | OXYGEN SATURATION: 96 % | HEIGHT: 68 IN | DIASTOLIC BLOOD PRESSURE: 82 MMHG | RESPIRATION RATE: 26 BRPM | SYSTOLIC BLOOD PRESSURE: 117 MMHG | RESPIRATION RATE: 4 BRPM | HEART RATE: 63 BPM | DIASTOLIC BLOOD PRESSURE: 68 MMHG | DIASTOLIC BLOOD PRESSURE: 46 MMHG | DIASTOLIC BLOOD PRESSURE: 72 MMHG | HEART RATE: 80 BPM | RESPIRATION RATE: 18 BRPM | TEMPERATURE: 97.9 F | OXYGEN SATURATION: 100 % | HEART RATE: 63 BPM | DIASTOLIC BLOOD PRESSURE: 56 MMHG | DIASTOLIC BLOOD PRESSURE: 74 MMHG | SYSTOLIC BLOOD PRESSURE: 126 MMHG | RESPIRATION RATE: 34 BRPM | TEMPERATURE: 97 F | HEART RATE: 62 BPM | HEART RATE: 64 BPM | DIASTOLIC BLOOD PRESSURE: 55 MMHG | HEIGHT: 68 IN | SYSTOLIC BLOOD PRESSURE: 104 MMHG | RESPIRATION RATE: 26 BRPM | RESPIRATION RATE: 16 BRPM | DIASTOLIC BLOOD PRESSURE: 82 MMHG | DIASTOLIC BLOOD PRESSURE: 72 MMHG | SYSTOLIC BLOOD PRESSURE: 111 MMHG | RESPIRATION RATE: 4 BRPM | DIASTOLIC BLOOD PRESSURE: 55 MMHG | HEART RATE: 61 BPM | WEIGHT: 158 LBS | RESPIRATION RATE: 18 BRPM | TEMPERATURE: 97 F | SYSTOLIC BLOOD PRESSURE: 106 MMHG | DIASTOLIC BLOOD PRESSURE: 45 MMHG | DIASTOLIC BLOOD PRESSURE: 82 MMHG | DIASTOLIC BLOOD PRESSURE: 56 MMHG | SYSTOLIC BLOOD PRESSURE: 177 MMHG | OXYGEN SATURATION: 99 % | WEIGHT: 158 LBS | SYSTOLIC BLOOD PRESSURE: 126 MMHG | SYSTOLIC BLOOD PRESSURE: 132 MMHG | OXYGEN SATURATION: 99 % | OXYGEN SATURATION: 95 % | RESPIRATION RATE: 31 BRPM | SYSTOLIC BLOOD PRESSURE: 153 MMHG | SYSTOLIC BLOOD PRESSURE: 153 MMHG | TEMPERATURE: 97 F | DIASTOLIC BLOOD PRESSURE: 72 MMHG | DIASTOLIC BLOOD PRESSURE: 46 MMHG | HEART RATE: 78 BPM | DIASTOLIC BLOOD PRESSURE: 55 MMHG | SYSTOLIC BLOOD PRESSURE: 111 MMHG | OXYGEN SATURATION: 97 % | SYSTOLIC BLOOD PRESSURE: 153 MMHG | SYSTOLIC BLOOD PRESSURE: 130 MMHG | DIASTOLIC BLOOD PRESSURE: 45 MMHG | DIASTOLIC BLOOD PRESSURE: 39 MMHG | RESPIRATION RATE: 20 BRPM | TEMPERATURE: 97 F | RESPIRATION RATE: 26 BRPM | SYSTOLIC BLOOD PRESSURE: 111 MMHG | DIASTOLIC BLOOD PRESSURE: 45 MMHG | SYSTOLIC BLOOD PRESSURE: 153 MMHG | OXYGEN SATURATION: 97 % | DIASTOLIC BLOOD PRESSURE: 74 MMHG | DIASTOLIC BLOOD PRESSURE: 74 MMHG | SYSTOLIC BLOOD PRESSURE: 134 MMHG | HEART RATE: 62 BPM | DIASTOLIC BLOOD PRESSURE: 80 MMHG | SYSTOLIC BLOOD PRESSURE: 106 MMHG | DIASTOLIC BLOOD PRESSURE: 39 MMHG | HEART RATE: 64 BPM | RESPIRATION RATE: 18 BRPM | DIASTOLIC BLOOD PRESSURE: 72 MMHG | OXYGEN SATURATION: 93 % | HEART RATE: 78 BPM | SYSTOLIC BLOOD PRESSURE: 117 MMHG | HEART RATE: 67 BPM | OXYGEN SATURATION: 93 % | HEART RATE: 62 BPM | OXYGEN SATURATION: 95 % | HEART RATE: 80 BPM | HEART RATE: 68 BPM | HEART RATE: 78 BPM | SYSTOLIC BLOOD PRESSURE: 153 MMHG | DIASTOLIC BLOOD PRESSURE: 82 MMHG | RESPIRATION RATE: 36 BRPM | DIASTOLIC BLOOD PRESSURE: 55 MMHG | HEART RATE: 62 BPM | OXYGEN SATURATION: 99 % | HEART RATE: 80 BPM | SYSTOLIC BLOOD PRESSURE: 104 MMHG | OXYGEN SATURATION: 100 % | SYSTOLIC BLOOD PRESSURE: 97 MMHG | RESPIRATION RATE: 18 BRPM | RESPIRATION RATE: 20 BRPM | RESPIRATION RATE: 31 BRPM | RESPIRATION RATE: 4 BRPM | SYSTOLIC BLOOD PRESSURE: 97 MMHG | SYSTOLIC BLOOD PRESSURE: 126 MMHG | RESPIRATION RATE: 34 BRPM | HEART RATE: 67 BPM | HEART RATE: 78 BPM | SYSTOLIC BLOOD PRESSURE: 111 MMHG | SYSTOLIC BLOOD PRESSURE: 109 MMHG | RESPIRATION RATE: 16 BRPM | SYSTOLIC BLOOD PRESSURE: 126 MMHG | RESPIRATION RATE: 36 BRPM | OXYGEN SATURATION: 100 % | RESPIRATION RATE: 16 BRPM | HEART RATE: 63 BPM | DIASTOLIC BLOOD PRESSURE: 68 MMHG | SYSTOLIC BLOOD PRESSURE: 111 MMHG | HEART RATE: 68 BPM | SYSTOLIC BLOOD PRESSURE: 177 MMHG | DIASTOLIC BLOOD PRESSURE: 56 MMHG | SYSTOLIC BLOOD PRESSURE: 109 MMHG | RESPIRATION RATE: 31 BRPM | DIASTOLIC BLOOD PRESSURE: 82 MMHG | HEART RATE: 62 BPM | RESPIRATION RATE: 16 BRPM | DIASTOLIC BLOOD PRESSURE: 46 MMHG | SYSTOLIC BLOOD PRESSURE: 126 MMHG | OXYGEN SATURATION: 97 % | SYSTOLIC BLOOD PRESSURE: 132 MMHG | OXYGEN SATURATION: 92 % | DIASTOLIC BLOOD PRESSURE: 45 MMHG | SYSTOLIC BLOOD PRESSURE: 134 MMHG | DIASTOLIC BLOOD PRESSURE: 80 MMHG | OXYGEN SATURATION: 97 % | DIASTOLIC BLOOD PRESSURE: 56 MMHG | DIASTOLIC BLOOD PRESSURE: 39 MMHG | RESPIRATION RATE: 34 BRPM | SYSTOLIC BLOOD PRESSURE: 132 MMHG | HEART RATE: 61 BPM | HEART RATE: 68 BPM | HEART RATE: 64 BPM | SYSTOLIC BLOOD PRESSURE: 97 MMHG | HEART RATE: 61 BPM | SYSTOLIC BLOOD PRESSURE: 130 MMHG | OXYGEN SATURATION: 93 % | OXYGEN SATURATION: 92 % | SYSTOLIC BLOOD PRESSURE: 109 MMHG | DIASTOLIC BLOOD PRESSURE: 39 MMHG | HEART RATE: 61 BPM | RESPIRATION RATE: 26 BRPM | DIASTOLIC BLOOD PRESSURE: 64 MMHG | DIASTOLIC BLOOD PRESSURE: 72 MMHG | DIASTOLIC BLOOD PRESSURE: 39 MMHG | OXYGEN SATURATION: 98 % | OXYGEN SATURATION: 95 % | RESPIRATION RATE: 36 BRPM | TEMPERATURE: 97.9 F | RESPIRATION RATE: 20 BRPM | SYSTOLIC BLOOD PRESSURE: 177 MMHG | HEART RATE: 64 BPM | DIASTOLIC BLOOD PRESSURE: 68 MMHG | HEART RATE: 80 BPM | OXYGEN SATURATION: 93 % | RESPIRATION RATE: 36 BRPM | RESPIRATION RATE: 31 BRPM | SYSTOLIC BLOOD PRESSURE: 109 MMHG | SYSTOLIC BLOOD PRESSURE: 109 MMHG | HEART RATE: 63 BPM | HEART RATE: 80 BPM | SYSTOLIC BLOOD PRESSURE: 117 MMHG | TEMPERATURE: 97.9 F | TEMPERATURE: 97.9 F | DIASTOLIC BLOOD PRESSURE: 68 MMHG | RESPIRATION RATE: 20 BRPM | OXYGEN SATURATION: 96 % | SYSTOLIC BLOOD PRESSURE: 177 MMHG | SYSTOLIC BLOOD PRESSURE: 177 MMHG | SYSTOLIC BLOOD PRESSURE: 132 MMHG | OXYGEN SATURATION: 93 % | DIASTOLIC BLOOD PRESSURE: 49 MMHG | SYSTOLIC BLOOD PRESSURE: 117 MMHG | OXYGEN SATURATION: 99 % | OXYGEN SATURATION: 98 % | RESPIRATION RATE: 20 BRPM | RESPIRATION RATE: 18 BRPM | OXYGEN SATURATION: 99 % | DIASTOLIC BLOOD PRESSURE: 82 MMHG | SYSTOLIC BLOOD PRESSURE: 104 MMHG | DIASTOLIC BLOOD PRESSURE: 68 MMHG | OXYGEN SATURATION: 97 % | RESPIRATION RATE: 26 BRPM | OXYGEN SATURATION: 99 % | HEART RATE: 63 BPM | RESPIRATION RATE: 36 BRPM | DIASTOLIC BLOOD PRESSURE: 80 MMHG | HEIGHT: 68 IN | DIASTOLIC BLOOD PRESSURE: 68 MMHG | DIASTOLIC BLOOD PRESSURE: 45 MMHG | HEART RATE: 64 BPM | HEART RATE: 67 BPM | RESPIRATION RATE: 36 BRPM | DIASTOLIC BLOOD PRESSURE: 45 MMHG | DIASTOLIC BLOOD PRESSURE: 80 MMHG | HEART RATE: 68 BPM | RESPIRATION RATE: 4 BRPM | WEIGHT: 158 LBS | HEART RATE: 65 BPM | RESPIRATION RATE: 16 BRPM | SYSTOLIC BLOOD PRESSURE: 134 MMHG | HEART RATE: 65 BPM | DIASTOLIC BLOOD PRESSURE: 72 MMHG | RESPIRATION RATE: 34 BRPM | DIASTOLIC BLOOD PRESSURE: 39 MMHG | TEMPERATURE: 97 F | TEMPERATURE: 97.9 F | RESPIRATION RATE: 34 BRPM | WEIGHT: 158 LBS | SYSTOLIC BLOOD PRESSURE: 97 MMHG | DIASTOLIC BLOOD PRESSURE: 80 MMHG | RESPIRATION RATE: 18 BRPM | OXYGEN SATURATION: 97 % | SYSTOLIC BLOOD PRESSURE: 130 MMHG | SYSTOLIC BLOOD PRESSURE: 153 MMHG | DIASTOLIC BLOOD PRESSURE: 49 MMHG | SYSTOLIC BLOOD PRESSURE: 130 MMHG | OXYGEN SATURATION: 96 % | OXYGEN SATURATION: 96 % | HEART RATE: 61 BPM | DIASTOLIC BLOOD PRESSURE: 55 MMHG | SYSTOLIC BLOOD PRESSURE: 132 MMHG | DIASTOLIC BLOOD PRESSURE: 49 MMHG | OXYGEN SATURATION: 96 % | DIASTOLIC BLOOD PRESSURE: 55 MMHG | OXYGEN SATURATION: 97 % | DIASTOLIC BLOOD PRESSURE: 64 MMHG | TEMPERATURE: 97.9 F | TEMPERATURE: 97.9 F | DIASTOLIC BLOOD PRESSURE: 80 MMHG | SYSTOLIC BLOOD PRESSURE: 117 MMHG | SYSTOLIC BLOOD PRESSURE: 126 MMHG | DIASTOLIC BLOOD PRESSURE: 49 MMHG | SYSTOLIC BLOOD PRESSURE: 111 MMHG | RESPIRATION RATE: 34 BRPM | SYSTOLIC BLOOD PRESSURE: 117 MMHG | HEART RATE: 65 BPM | OXYGEN SATURATION: 95 % | HEART RATE: 67 BPM | HEART RATE: 78 BPM | TEMPERATURE: 97 F | SYSTOLIC BLOOD PRESSURE: 106 MMHG | SYSTOLIC BLOOD PRESSURE: 153 MMHG | DIASTOLIC BLOOD PRESSURE: 64 MMHG | RESPIRATION RATE: 16 BRPM | OXYGEN SATURATION: 95 % | DIASTOLIC BLOOD PRESSURE: 74 MMHG | SYSTOLIC BLOOD PRESSURE: 177 MMHG | SYSTOLIC BLOOD PRESSURE: 104 MMHG | DIASTOLIC BLOOD PRESSURE: 74 MMHG | HEIGHT: 68 IN | HEART RATE: 68 BPM | HEART RATE: 80 BPM | DIASTOLIC BLOOD PRESSURE: 64 MMHG | OXYGEN SATURATION: 92 % | SYSTOLIC BLOOD PRESSURE: 134 MMHG | OXYGEN SATURATION: 100 % | SYSTOLIC BLOOD PRESSURE: 132 MMHG | HEART RATE: 62 BPM | DIASTOLIC BLOOD PRESSURE: 64 MMHG | DIASTOLIC BLOOD PRESSURE: 49 MMHG | OXYGEN SATURATION: 100 % | SYSTOLIC BLOOD PRESSURE: 97 MMHG | DIASTOLIC BLOOD PRESSURE: 56 MMHG | HEART RATE: 68 BPM | RESPIRATION RATE: 16 BRPM | HEART RATE: 62 BPM | HEIGHT: 68 IN | OXYGEN SATURATION: 98 % | WEIGHT: 158 LBS | SYSTOLIC BLOOD PRESSURE: 97 MMHG | WEIGHT: 158 LBS | RESPIRATION RATE: 26 BRPM | OXYGEN SATURATION: 93 % | OXYGEN SATURATION: 95 % | OXYGEN SATURATION: 100 % | OXYGEN SATURATION: 99 % | OXYGEN SATURATION: 96 % | WEIGHT: 158 LBS | RESPIRATION RATE: 4 BRPM | DIASTOLIC BLOOD PRESSURE: 74 MMHG | OXYGEN SATURATION: 98 % | DIASTOLIC BLOOD PRESSURE: 80 MMHG | SYSTOLIC BLOOD PRESSURE: 106 MMHG | RESPIRATION RATE: 18 BRPM | DIASTOLIC BLOOD PRESSURE: 46 MMHG | DIASTOLIC BLOOD PRESSURE: 74 MMHG | SYSTOLIC BLOOD PRESSURE: 111 MMHG | HEART RATE: 65 BPM | RESPIRATION RATE: 31 BRPM | RESPIRATION RATE: 20 BRPM | DIASTOLIC BLOOD PRESSURE: 56 MMHG | HEART RATE: 61 BPM | RESPIRATION RATE: 20 BRPM | HEART RATE: 63 BPM | DIASTOLIC BLOOD PRESSURE: 45 MMHG | SYSTOLIC BLOOD PRESSURE: 117 MMHG | OXYGEN SATURATION: 95 % | OXYGEN SATURATION: 98 % | HEIGHT: 68 IN | HEIGHT: 68 IN | OXYGEN SATURATION: 98 % | OXYGEN SATURATION: 92 % | DIASTOLIC BLOOD PRESSURE: 46 MMHG | SYSTOLIC BLOOD PRESSURE: 177 MMHG | HEART RATE: 78 BPM | HEART RATE: 65 BPM | SYSTOLIC BLOOD PRESSURE: 132 MMHG | OXYGEN SATURATION: 92 % | DIASTOLIC BLOOD PRESSURE: 68 MMHG | RESPIRATION RATE: 26 BRPM | DIASTOLIC BLOOD PRESSURE: 46 MMHG | HEART RATE: 80 BPM | DIASTOLIC BLOOD PRESSURE: 64 MMHG | HEART RATE: 61 BPM | OXYGEN SATURATION: 98 % | SYSTOLIC BLOOD PRESSURE: 104 MMHG | RESPIRATION RATE: 31 BRPM | HEART RATE: 68 BPM | TEMPERATURE: 97 F | HEART RATE: 67 BPM | OXYGEN SATURATION: 100 % | RESPIRATION RATE: 36 BRPM | SYSTOLIC BLOOD PRESSURE: 130 MMHG | SYSTOLIC BLOOD PRESSURE: 134 MMHG | DIASTOLIC BLOOD PRESSURE: 49 MMHG | SYSTOLIC BLOOD PRESSURE: 109 MMHG | HEART RATE: 67 BPM | HEART RATE: 64 BPM | DIASTOLIC BLOOD PRESSURE: 82 MMHG | SYSTOLIC BLOOD PRESSURE: 106 MMHG | HEART RATE: 67 BPM | OXYGEN SATURATION: 96 % | DIASTOLIC BLOOD PRESSURE: 55 MMHG

## 2024-09-03 ENCOUNTER — CLINICAL SUPPORT NO REQUIREMENTS (OUTPATIENT)
Age: 79
End: 2024-09-03
Payer: MEDICARE

## 2024-09-03 ENCOUNTER — OFFICE VISIT (OUTPATIENT)
Age: 79
End: 2024-09-03
Payer: MEDICARE

## 2024-09-03 VITALS
WEIGHT: 156 LBS | HEIGHT: 66 IN | OXYGEN SATURATION: 99 % | DIASTOLIC BLOOD PRESSURE: 70 MMHG | SYSTOLIC BLOOD PRESSURE: 122 MMHG | HEART RATE: 60 BPM | BODY MASS INDEX: 25.07 KG/M2

## 2024-09-03 DIAGNOSIS — N18.30 STAGE 3 CHRONIC KIDNEY DISEASE, UNSPECIFIED WHETHER STAGE 3A OR 3B CKD: ICD-10-CM

## 2024-09-03 DIAGNOSIS — I47.19 ATRIAL TACHYCARDIA: Primary | Chronic | ICD-10-CM

## 2024-09-03 DIAGNOSIS — Z95.0 PRESENCE OF CARDIAC PACEMAKER: Primary | ICD-10-CM

## 2024-09-03 DIAGNOSIS — I10 ESSENTIAL HYPERTENSION: Chronic | ICD-10-CM

## 2024-09-03 DIAGNOSIS — E78.2 MIXED HYPERLIPIDEMIA: Chronic | ICD-10-CM

## 2024-09-03 DIAGNOSIS — E03.9 ACQUIRED HYPOTHYROIDISM: ICD-10-CM

## 2024-09-03 DIAGNOSIS — I34.0 NONRHEUMATIC MITRAL VALVE REGURGITATION: ICD-10-CM

## 2024-09-03 DIAGNOSIS — I49.5 SICK SINUS SYNDROME DUE TO SA NODE DYSFUNCTION: ICD-10-CM

## 2024-09-03 DIAGNOSIS — Z95.0 PRESENCE OF CARDIAC PACEMAKER: Chronic | ICD-10-CM

## 2024-09-03 PROCEDURE — 93280 PM DEVICE PROGR EVAL DUAL: CPT | Performed by: INTERNAL MEDICINE

## 2024-09-03 RX ORDER — LANOLIN ALCOHOL/MO/W.PET/CERES
10 CREAM (GRAM) TOPICAL NIGHTLY
COMMUNITY

## 2024-09-03 NOTE — PROGRESS NOTES
Subjective:     Encounter Date:09/03/2024      Patient ID: Meme Morales is a 79 y.o. female.    Chief Complaint:  History of Present Illness      This is a 79-year-old with hypertension, hyperlipidemia, sick sinus syndrome status post dual-chamber permanent pacemaker placement, cerebral aneurysm status post coiling, chronic kidney disease, thoracic outlet syndrome secondary to cervical rib resulting in occasional right arm numbness, hypothyroidism, who presents for follow-up.     At a follow up in 3/2023 she was taking lisinopril 10 mg daily and only taking the hydrochlorothiazide as needed which was usually about once a week.  If she took hydrochlorothiazide every day she reportedly became too dehydrated and her blood pressures would decline.  They also indicated her blood pressure would decline with a higher dose of lisinopril.  Her blood pressure readings before taking her medications was in the 150s to 160s and after in the 140s.  She also reported worsening dyspnea and fatigue.  Discussed options and decided to work on blood pressures first.  Amlodipine 2.5 mg daily was added to her regimen.       She followed up with LINWOOD Gerber in 5/2023.  At that time her BP was well controlled.  She continued to complain of dyspnea on exertion but also chest fullness.  Opted to proceed with a repeat stress test at that time.  This was performed on 5/19 and showed no evidence of ischemia.      At her last 2 follow-ups in 8/2023 with me and 3/2024 with Addie Lennox, APRN her symptoms were largely stable including chronic dyspnea on exertion.  No changes were made to her management.    She reports that since her last follow-up she has been struggling with anemia.  She began having symptoms of increased fatigue and some worsening of her dyspnea on exertion.  She attributes this all to her anemia since the timing of her symptoms coincides with the anemia onset.  She otherwise denies any chest pain.  She reports  some lightheadedness especially with position changes that usually occurs if she has not been drinking enough water.  This usually gets better when she does hydrate better.  She reports chronic palpitations that are unchanged.  She denies any orthopnea, near-syncope or syncope or lower extremity swelling.  She has an EGD and colonoscopy coming up for workup of her anemia.  She does not check her blood pressures regularly at home but when she does it is pretty well-controlled.    She underwent a device interrogation today which showed normal function with 76% atrial paced rhythm and 28% right ventricular paced with no events.        Prior History:    The patient was previously followed by Dr. Xochilt Conner.  She last saw Dr. Conner in 6/2022.  The patient reported that she had been having issues with orthostatic hypotension.  She indicated at the time that her blood pressure was dropping down to the 70s with standing.  At that time discontinued amlodipine and placed her on lisinopril/hydrochlorothiazide.  Her blood pressure continued to drop so she cut her dosage in half and was taking 10 mg of lisinopril and 12.5 mg of hydrochlorothiazide.  She was also taking furosemide as needed about once a week.  She is complaining of worsening dyspnea and fatigue with activity.  Dr. Conner recommended staying on the low-dose of lisinopril-hydrochlorothiazide and proceeding with stress test and an echocardiogram.  These were both performed on 6/17/2022.  Stress test showed a small mild to moderately severe area of apical ischemia and an EF of greater than 70%.  Echocardiogram showed normal left ventricular systolic function wall motion with an EF of 60%, grade 1 diastolic dysfunction, mildly dilated right ventricle, moderately dilated left atrium, mild mitral valve and moderate tricuspid valve regurgitation and mild pulmonary hypertension.  No further work-up was recommended regarding her abnormal stress test since this  was a low risk finding.     She saw LINWOOD Gerber in 12/2022.  She reported that her dyspnea was at her baseline.  Her blood pressures were running a little bit high with systolics in the 140s to 150s.  The patient and her sister wanted to split the lisinopril and hydrochlorothiazide and to separate pills.    Review of Systems   Constitutional: Negative for malaise/fatigue.   HENT:  Negative for hearing loss, hoarse voice, nosebleeds and sore throat.    Eyes:  Negative for pain.   Cardiovascular:  Positive for dyspnea on exertion and leg swelling. Negative for chest pain, claudication, cyanosis, irregular heartbeat, near-syncope, orthopnea, palpitations, paroxysmal nocturnal dyspnea and syncope.   Respiratory:  Negative for shortness of breath and snoring.    Endocrine: Negative for cold intolerance, heat intolerance, polydipsia, polyphagia and polyuria.   Skin:  Negative for itching and rash.   Musculoskeletal:  Negative for arthritis, falls, joint pain, joint swelling, muscle cramps, muscle weakness and myalgias.   Gastrointestinal:  Negative for constipation, diarrhea, dysphagia, heartburn, hematemesis, hematochezia, melena, nausea and vomiting.   Genitourinary:  Negative for frequency, hematuria and hesitancy.   Neurological:  Positive for light-headedness. Negative for excessive daytime sleepiness, dizziness, headaches, numbness and weakness.   Psychiatric/Behavioral:  Negative for depression. The patient is not nervous/anxious.          Current Outpatient Medications:   •  Acetaminophen 500 MG capsule, Daily., Disp: , Rfl:   •  amLODIPine (NORVASC) 2.5 MG tablet, TAKE 1 TABLET BY MOUTH DAILY, Disp: 90 tablet, Rfl: 1  •  ascorbic acid (VITAMIN C) 1000 MG tablet, Daily., Disp: , Rfl:   •  aspirin  MG tablet, Take 1 tablet by mouth Daily., Disp: 30 tablet, Rfl: 6  •  atorvastatin (LIPITOR) 40 MG tablet, Take 1 tablet by mouth Daily., Disp: 90 tablet, Rfl: 3  •  Cholecalciferol (VITAMIN D3) 2000 units  tablet, Take 1 tablet by mouth Daily., Disp: , Rfl:   •  famotidine (PEPCID) 40 MG tablet, , Disp: , Rfl:   •  ferrous sulfate 325 (65 FE) MG tablet, Take 1 tablet by mouth Daily. With food, Disp: 90 tablet, Rfl: 3  •  flunisolide (NASALIDE) 25 MCG/ACT (0.025%) solution nasal spray, USE 2 SPRAYS NASALLY EVERY 12  HOURS, Disp: 100 mL, Rfl: 1  •  hydroCHLOROthiazide (MICROZIDE) 12.5 MG capsule, TAKE 1 CAPSULE BY MOUTH DAILY, Disp: 90 capsule, Rfl: 3  •  levothyroxine (SYNTHROID, LEVOTHROID) 75 MCG tablet, Take 1 tablet by mouth Daily., Disp: 90 tablet, Rfl: 3  •  lisinopril (PRINIVIL,ZESTRIL) 10 MG tablet, TAKE 1 TABLET BY MOUTH DAILY, Disp: 90 tablet, Rfl: 3  •  melatonin 3 MG tablet, Take 10 mg by mouth Every Night., Disp: , Rfl:   •  omeprazole (priLOSEC) 40 MG capsule, Take 1 capsule by mouth 2 (Two) Times a Day., Disp: , Rfl:   •  ondansetron (ZOFRAN) 4 MG tablet, Take 1 tablet by mouth., Disp: , Rfl:   •  rOPINIRole (REQUIP) 1 MG tablet, Take 1 tablet by mouth 3 (Three) Times a Day., Disp: 270 tablet, Rfl: 3  •  Turmeric Curcumin 500 MG capsule, Take  by mouth., Disp: , Rfl:     Past Medical History:   Diagnosis Date   • Acid reflux    • Arthritis    • Arthritis of carpometacarpal (CMC) joint of right thumb    • Francisco esophagus    • Basal cell carcinoma    • Brain aneurysm    • Burning with urination    • Cancer     non malignant skin cancers   • Cerebral aneurysm    • Congestive heart failure    • Depression    • Difficulty swallowing    • Disease of thyroid gland     HISTORY OF NODULES, SURGERY.    • Encounter for Medicare annual wellness exam    • Esophageal reflux    • Exposure to chickenpox    • H/O TB skin testing     WITH POSITIVE RESULT. NEG CXR.   • Heart murmur    • High blood pressure    • History of hyperparathyroidism    • History of migraine headaches    • Hoarseness    • Hypercalcemia    • Hyperlipidemia    • Hypertension    • Hypothyroidism    • Leaky heart valve    • Low back pain    •  Osteopenia with high risk of fracture    • Overweight with body mass index (BMI) of 25 to 25.9 in adult    • Pacemaker     IMPLANTED LEFT SIDE   • Palpitations    • Pseudocholinesterase deficiency     AUNT ON FATHER'S SIDE OF FAMILY   • Pulmonary hypertension    • Rheumatic multiple valve disease    • RLS (restless legs syndrome)    • Rubella    • Seasonal allergies    • Sick sinus syndrome due to SA node dysfunction     REASON FOR PACEMAKER   • Skin disorder    • Slow to wake up after anesthesia    • Stage 3 chronic kidney disease    • Symptoms of urinary tract infection    • Thoracic outlet syndrome    • Thyroid disease    • Tinea cruris    • Tricuspid valve regurgitation    • Urinary hesitancy    • Valvular heart disease    • Vitamin D deficiency        Past Surgical History:   Procedure Laterality Date   • CARDIAC CATHETERIZATION      Post-Proc Data:  Lesions successfully dilated    • CATARACT EXTRACTION WITH INTRAOCULAR LENS IMPLANT      LEFT AND RIGHT   • CEREBRAL ANGIOGRAM N/A 6/26/2019    Procedure: CEREBRAL ANGIOGRAM;  Surgeon: Ranjit Key MD;  Location: [facility] OR 18/19;  Service: Neurosurgery   • COLONOSCOPY     • CYST REMOVAL Right     REMOVED UNDER RIGHT ARM. BENIGN   • EMBOLIZATION CEREBRAL N/A 11/26/2018    Procedure: Cerebral angiography and embolization of cerebral aneurysm with possible Neuroform stent;  Surgeon: Ranjit Key MD;  Location: [facility] OR 18/19;  Service: Neurosurgery   • HYSTERECTOMY     • PACEMAKER IMPLANTATION Left     [employer]   • PARATHYROID GLAND SURGERY      ONE GLAND RIGHT   • THYROIDECTOMY, PARTIAL Right    • TONSILLECTOMY AND ADENOIDECTOMY     • UPPER GASTROINTESTINAL ENDOSCOPY         Family History   Problem Relation Age of Onset   • Hypertension Mother    • Heart disease Father    • Stroke Father    • Heart attack Father    • Hypertension Father    • Hypertension Sister    • Stroke Maternal Grandmother    • Diabetes type II Child    •  "Hypertension Child    • Malig Hyperthermia Neg Hx        Social History     Tobacco Use   • Smoking status: Former     Current packs/day: 0.00     Average packs/day: 1.5 packs/day for 25.0 years (37.5 ttl pk-yrs)     Types: Cigarettes     Start date:      Quit date:      Years since quittin.6   • Smokeless tobacco: Never   Vaping Use   • Vaping status: Never Used   Substance Use Topics   • Alcohol use: Yes     Comment: occasional   • Drug use: No         ECG 12 Lead    Date/Time: 9/3/2024 11:12 AM  Performed by: Yessy Steve MD    Authorized by: Yessy Steve MD  Conduction: right bundle branch block and left anterior fascicular block  Comments: Atrial paced rhythm           Objective:     Visit Vitals  /70 (BP Location: Left arm, Patient Position: Sitting, Cuff Size: Adult)   Pulse 60   Ht 167.6 cm (66\")   Wt 70.8 kg (156 lb)   SpO2 99%   BMI 25.18 kg/m²         Constitutional:       Appearance: Normal appearance. Well-developed.   Eyes:      General: Lids are normal.      Conjunctiva/sclera: Conjunctivae normal.      Pupils: Pupils are equal, round, and reactive to light.   HENT:      Head: Normocephalic and atraumatic.   Neck:      Vascular: No carotid bruit or JVD.      Lymphadenopathy: No cervical adenopathy.   Pulmonary:      Effort: Pulmonary effort is normal.      Breath sounds: Normal breath sounds.   Cardiovascular:      Normal rate. Regular rhythm.      No gallop.    Pulses:     Radial: 2+ bilaterally.  Edema:     Peripheral edema absent.   Abdominal:      Palpations: Abdomen is soft.   Musculoskeletal:      Cervical back: Full passive range of motion without pain, normal range of motion and neck supple. Skin:     General: Skin is warm and dry.   Neurological:      Mental Status: Alert and oriented to person, place, and time.           Assessment:          Diagnosis Plan   1. Atrial tachycardia        2. Essential hypertension        3. Nonrheumatic mitral valve regurgitation   "      4. Sick sinus syndrome due to SA node dysfunction        5. Presence of cardiac pacemaker        6. Mixed hyperlipidemia        7. Acquired hypothyroidism        8. Stage 3 chronic kidney disease, unspecified whether stage 3a or 3b CKD               Plan:       1.  Dyspnea/fatigue.  Dyspnea on exertion is chronic symptom.  There has been some worsening recently but the patient attributes this to her recent issues with anemia.  She is currently undergoing workup of this including an EGD and colonoscopy.  Will monitor the symptoms for now and consider further cardiac workup if the symptoms continue or progress despite management or improvement in her anemia.  2.  Hypertension.  Well-controlled on her current regimen medications.  Continue the same.  3.  Sick sinus syndrome.  Status post dual-chamber permanent pacemaker placement.  Normal function today.  Continue routine device checks.  4.  Hyperlipidemia.  On cortisone which is managed by LINWOOD Vargas.  5.  Hypothyroidism  6.  Chronic kidney disease stage III  7.  History of paroxysmal atrial tachycardia.  No recent episodes.    Will plan on seeing the patient back again in 6 months.

## 2024-09-03 NOTE — LETTER
September 3, 2024       No Recipients    Patient: Meme Morales   YOB: 1945   Date of Visit: 9/3/2024       Dear Roula (Mara) LINWOOD Alvares,    Meme Morales was in my office today. Below are the relevant portions of my assessment and plan of care.           If you have questions, please do not hesitate to call me. I look forward to following Meme along with you.         Sincerely,        Yessy Steve MD        CC:   No Recipients

## 2024-09-06 ENCOUNTER — AMBULATORY SURGICAL CENTER (AMBULATORY)
Age: 79
End: 2024-09-06
Payer: MEDICARE

## 2024-09-06 ENCOUNTER — OFFICE (AMBULATORY)
Age: 79
End: 2024-09-06
Payer: MEDICARE

## 2024-09-06 ENCOUNTER — AMBULATORY SURGICAL CENTER (AMBULATORY)
Dept: URBAN - METROPOLITAN AREA SURGERY 17 | Facility: SURGERY | Age: 79
End: 2024-09-06
Payer: MEDICARE

## 2024-09-06 ENCOUNTER — OFFICE (AMBULATORY)
Dept: URBAN - METROPOLITAN AREA PATHOLOGY 4 | Facility: PATHOLOGY | Age: 79
End: 2024-09-06
Payer: MEDICARE

## 2024-09-06 DIAGNOSIS — K31.89 OTHER DISEASES OF STOMACH AND DUODENUM: ICD-10-CM

## 2024-09-06 DIAGNOSIS — K22.70 BARRETT'S ESOPHAGUS WITHOUT DYSPLASIA: ICD-10-CM

## 2024-09-06 DIAGNOSIS — K57.30 DIVERTICULOSIS OF LARGE INTESTINE WITHOUT PERFORATION OR ABS: ICD-10-CM

## 2024-09-06 DIAGNOSIS — K21.00 GASTRO-ESOPHAGEAL REFLUX DISEASE WITH ESOPHAGITIS, WITHOUT B: ICD-10-CM

## 2024-09-06 DIAGNOSIS — D50.9 IRON DEFICIENCY ANEMIA, UNSPECIFIED: ICD-10-CM

## 2024-09-06 DIAGNOSIS — D12.2 BENIGN NEOPLASM OF ASCENDING COLON: ICD-10-CM

## 2024-09-06 DIAGNOSIS — K29.60 OTHER GASTRITIS WITHOUT BLEEDING: ICD-10-CM

## 2024-09-06 DIAGNOSIS — K63.5 POLYP OF COLON: ICD-10-CM

## 2024-09-06 PROBLEM — D12.3 BENIGN NEOPLASM OF TRANSVERSE COLON: Status: ACTIVE | Noted: 2024-09-06

## 2024-09-06 LAB
GI HISTOLOGY: A. STOMACH ANTRUM: (no result)
GI HISTOLOGY: D. PROXIMAL ASCENDING COLON: (no result)
GI HISTOLOGY: E. HEPATIC FLEXURE: (no result)
GI HISTOLOGY: PDF REPORT: (no result)
Lab: (no result)

## 2024-09-06 PROCEDURE — 88305 TISSUE EXAM BY PATHOLOGIST: CPT | Performed by: PATHOLOGY

## 2024-09-06 PROCEDURE — 88342 IMHCHEM/IMCYTCHM 1ST ANTB: CPT | Performed by: PATHOLOGY

## 2024-09-06 PROCEDURE — 45385 COLONOSCOPY W/LESION REMOVAL: CPT | Performed by: INTERNAL MEDICINE

## 2024-09-06 PROCEDURE — 43239 EGD BIOPSY SINGLE/MULTIPLE: CPT | Performed by: INTERNAL MEDICINE

## 2024-09-06 PROCEDURE — 45380 COLONOSCOPY AND BIOPSY: CPT | Mod: 59 | Performed by: INTERNAL MEDICINE

## 2024-09-09 RX ORDER — AMLODIPINE BESYLATE 2.5 MG/1
2.5 TABLET ORAL DAILY
Qty: 90 TABLET | Refills: 1 | Status: SHIPPED | OUTPATIENT
Start: 2024-09-09

## 2024-10-28 PROCEDURE — 93296 REM INTERROG EVL PM/IDS: CPT | Performed by: INTERNAL MEDICINE

## 2024-10-28 PROCEDURE — 93294 REM INTERROG EVL PM/LDLS PM: CPT | Performed by: INTERNAL MEDICINE

## 2024-11-14 ENCOUNTER — OFFICE VISIT (OUTPATIENT)
Dept: FAMILY MEDICINE CLINIC | Facility: CLINIC | Age: 79
End: 2024-11-14
Payer: MEDICARE

## 2024-11-14 VITALS
OXYGEN SATURATION: 98 % | HEART RATE: 62 BPM | SYSTOLIC BLOOD PRESSURE: 130 MMHG | DIASTOLIC BLOOD PRESSURE: 60 MMHG | BODY MASS INDEX: 25.71 KG/M2 | WEIGHT: 160 LBS | HEIGHT: 66 IN | RESPIRATION RATE: 16 BRPM

## 2024-11-14 DIAGNOSIS — D64.9 ANEMIA, UNSPECIFIED TYPE: Primary | ICD-10-CM

## 2024-11-14 PROCEDURE — 99213 OFFICE O/P EST LOW 20 MIN: CPT | Performed by: NURSE PRACTITIONER

## 2024-11-14 PROCEDURE — 1160F RVW MEDS BY RX/DR IN RCRD: CPT | Performed by: NURSE PRACTITIONER

## 2024-11-14 PROCEDURE — 1159F MED LIST DOCD IN RCRD: CPT | Performed by: NURSE PRACTITIONER

## 2024-11-14 PROCEDURE — 3075F SYST BP GE 130 - 139MM HG: CPT | Performed by: NURSE PRACTITIONER

## 2024-11-14 PROCEDURE — 3078F DIAST BP <80 MM HG: CPT | Performed by: NURSE PRACTITIONER

## 2024-11-14 RX ORDER — FERROUS SULFATE 325(65) MG
325 TABLET ORAL DAILY
Start: 2024-11-14

## 2024-11-14 NOTE — PROGRESS NOTES
Subjective   Meme Morales is a 79 y.o. female.     Anemia  There has been no palpitations.      Patient presents to office to follow-up on anemia.  She has been taking an over-the-counter iron supplement as she was not able to get the prescription 325 mg.  She was told by the pharmacist that the dose she is taking is equivalent.  Her last labs were in August which did show improving iron count and slightly improved hemoglobin.  She has been taking iron supplement daily.  The following portions of the patient's history were reviewed and updated as appropriate: allergies, current medications, past family history, past medical history, past social history, past surgical history, and problem list.    Review of Systems   Constitutional:  Negative for unexpected weight change.   Respiratory:  Negative for shortness of breath.    Cardiovascular:  Negative for chest pain and palpitations.   Psychiatric/Behavioral:  Negative for behavioral problems.        Objective   Physical Exam  Vitals and nursing note reviewed.   Constitutional:       Appearance: Normal appearance. She is well-developed.   Cardiovascular:      Rate and Rhythm: Normal rate and regular rhythm.   Pulmonary:      Effort: Pulmonary effort is normal.      Breath sounds: Normal breath sounds.   Neurological:      Mental Status: She is alert and oriented to person, place, and time.   Psychiatric:         Mood and Affect: Mood normal.         Behavior: Behavior normal.         Thought Content: Thought content normal.         Judgment: Judgment normal.         Assessment & Plan   Diagnoses and all orders for this visit:    1. Anemia, unspecified type (Primary)  -     CBC & Differential  -     Iron level  -     Ferritin    Other orders  -     ferrous sulfate 325 (65 FE) MG tablet; Take 1 tablet by mouth Daily. With food        Will recheck CBC iron and ferritin today and then determine how often she will need to take iron supplement.

## 2024-11-15 LAB
BASOPHILS # BLD AUTO: 0.1 10*3/MM3 (ref 0–0.2)
BASOPHILS NFR BLD AUTO: 1.1 % (ref 0–1.5)
EOSINOPHIL # BLD AUTO: 0.79 10*3/MM3 (ref 0–0.4)
EOSINOPHIL NFR BLD AUTO: 8.6 % (ref 0.3–6.2)
ERYTHROCYTE [DISTWIDTH] IN BLOOD BY AUTOMATED COUNT: 12.6 % (ref 12.3–15.4)
FERRITIN SERPL-MCNC: 361 NG/ML (ref 13–150)
HCT VFR BLD AUTO: 33.4 % (ref 34–46.6)
HGB BLD-MCNC: 11.1 G/DL (ref 12–15.9)
IMM GRANULOCYTES # BLD AUTO: 0.03 10*3/MM3 (ref 0–0.05)
IMM GRANULOCYTES NFR BLD AUTO: 0.3 % (ref 0–0.5)
IRON SERPL-MCNC: 70 MCG/DL (ref 37–145)
LYMPHOCYTES # BLD AUTO: 1.49 10*3/MM3 (ref 0.7–3.1)
LYMPHOCYTES NFR BLD AUTO: 16.2 % (ref 19.6–45.3)
MCH RBC QN AUTO: 30.8 PG (ref 26.6–33)
MCHC RBC AUTO-ENTMCNC: 33.2 G/DL (ref 31.5–35.7)
MCV RBC AUTO: 92.8 FL (ref 79–97)
MONOCYTES # BLD AUTO: 0.64 10*3/MM3 (ref 0.1–0.9)
MONOCYTES NFR BLD AUTO: 7 % (ref 5–12)
NEUTROPHILS # BLD AUTO: 6.12 10*3/MM3 (ref 1.7–7)
NEUTROPHILS NFR BLD AUTO: 66.8 % (ref 42.7–76)
NRBC BLD AUTO-RTO: 0 /100 WBC (ref 0–0.2)
PLATELET # BLD AUTO: 265 10*3/MM3 (ref 140–450)
RBC # BLD AUTO: 3.6 10*6/MM3 (ref 3.77–5.28)
WBC # BLD AUTO: 9.17 10*3/MM3 (ref 3.4–10.8)

## 2024-11-25 ENCOUNTER — OFFICE (AMBULATORY)
Age: 79
End: 2024-11-25
Payer: MEDICARE

## 2024-11-25 ENCOUNTER — OFFICE (AMBULATORY)
Dept: URBAN - METROPOLITAN AREA CLINIC 76 | Facility: CLINIC | Age: 79
End: 2024-11-25
Payer: MEDICARE

## 2024-11-25 VITALS
HEIGHT: 68 IN | HEIGHT: 68 IN | OXYGEN SATURATION: 98 % | WEIGHT: 161 LBS | HEART RATE: 70 BPM | DIASTOLIC BLOOD PRESSURE: 74 MMHG | SYSTOLIC BLOOD PRESSURE: 124 MMHG | HEART RATE: 70 BPM | SYSTOLIC BLOOD PRESSURE: 124 MMHG | DIASTOLIC BLOOD PRESSURE: 74 MMHG | HEIGHT: 68 IN | DIASTOLIC BLOOD PRESSURE: 74 MMHG | SYSTOLIC BLOOD PRESSURE: 124 MMHG | OXYGEN SATURATION: 98 % | HEIGHT: 68 IN | WEIGHT: 161 LBS | WEIGHT: 161 LBS | SYSTOLIC BLOOD PRESSURE: 124 MMHG | HEART RATE: 70 BPM | SYSTOLIC BLOOD PRESSURE: 124 MMHG | OXYGEN SATURATION: 98 % | OXYGEN SATURATION: 98 % | DIASTOLIC BLOOD PRESSURE: 74 MMHG | SYSTOLIC BLOOD PRESSURE: 124 MMHG | OXYGEN SATURATION: 98 % | HEIGHT: 68 IN | HEART RATE: 70 BPM | OXYGEN SATURATION: 98 % | WEIGHT: 161 LBS | HEART RATE: 70 BPM | HEART RATE: 70 BPM | WEIGHT: 161 LBS | SYSTOLIC BLOOD PRESSURE: 124 MMHG | HEART RATE: 70 BPM | HEIGHT: 68 IN | OXYGEN SATURATION: 98 % | DIASTOLIC BLOOD PRESSURE: 74 MMHG | HEIGHT: 68 IN | WEIGHT: 161 LBS | DIASTOLIC BLOOD PRESSURE: 74 MMHG | WEIGHT: 161 LBS | DIASTOLIC BLOOD PRESSURE: 74 MMHG

## 2024-11-25 DIAGNOSIS — K21.9 GASTRO-ESOPHAGEAL REFLUX DISEASE WITHOUT ESOPHAGITIS: ICD-10-CM

## 2024-11-25 DIAGNOSIS — D50.9 IRON DEFICIENCY ANEMIA, UNSPECIFIED: ICD-10-CM

## 2024-11-25 DIAGNOSIS — K22.70 BARRETT'S ESOPHAGUS WITHOUT DYSPLASIA: ICD-10-CM

## 2024-11-25 PROCEDURE — 99213 OFFICE O/P EST LOW 20 MIN: CPT

## 2024-12-05 RX ORDER — HYDROCHLOROTHIAZIDE 12.5 MG/1
12.5 CAPSULE ORAL DAILY
Qty: 90 CAPSULE | Refills: 3 | Status: SHIPPED | OUTPATIENT
Start: 2024-12-05

## 2025-02-11 ENCOUNTER — TELEPHONE (OUTPATIENT)
Dept: FAMILY MEDICINE CLINIC | Facility: CLINIC | Age: 80
End: 2025-02-11
Payer: MEDICARE

## 2025-02-11 NOTE — TELEPHONE ENCOUNTER
Pt fell  on 1/3/2025. Pt did not go to [facility]. X-rays done [facility].   Do you want to see prior to referral?

## 2025-02-11 NOTE — TELEPHONE ENCOUNTER
Caller: Meme Morales    Relationship: Self    Best call back number: [phone]     What is the medical concern/diagnosis: FELL ON BACK AND HURT LEFT HIP.  XRAY SHOWS NO BROKEN BONES.    What specialty or service is being requested: PHYSICAL THERAPY    What is the provider, practice or medical service name:  [facility]      What is the office phone number: [phone]    FAX: [phone]

## 2025-02-27 RX ORDER — LISINOPRIL 10 MG/1
10 TABLET ORAL DAILY
Qty: 90 TABLET | Refills: 3 | Status: SHIPPED | OUTPATIENT
Start: 2025-02-27

## 2025-03-04 ENCOUNTER — OFFICE VISIT (OUTPATIENT)
Dept: CARDIOLOGY | Facility: CLINIC | Age: 80
End: 2025-03-04
Payer: MEDICARE

## 2025-03-04 VITALS
BODY MASS INDEX: 25.71 KG/M2 | WEIGHT: 160 LBS | HEIGHT: 66 IN | SYSTOLIC BLOOD PRESSURE: 126 MMHG | DIASTOLIC BLOOD PRESSURE: 74 MMHG | HEART RATE: 61 BPM

## 2025-03-04 DIAGNOSIS — I10 PRIMARY HYPERTENSION: Primary | ICD-10-CM

## 2025-03-04 PROCEDURE — 3078F DIAST BP <80 MM HG: CPT | Performed by: NURSE PRACTITIONER

## 2025-03-04 PROCEDURE — 1160F RVW MEDS BY RX/DR IN RCRD: CPT | Performed by: NURSE PRACTITIONER

## 2025-03-04 PROCEDURE — 3074F SYST BP LT 130 MM HG: CPT | Performed by: NURSE PRACTITIONER

## 2025-03-04 PROCEDURE — 99214 OFFICE O/P EST MOD 30 MIN: CPT | Performed by: NURSE PRACTITIONER

## 2025-03-04 PROCEDURE — 1159F MED LIST DOCD IN RCRD: CPT | Performed by: NURSE PRACTITIONER

## 2025-03-04 PROCEDURE — 93000 ELECTROCARDIOGRAM COMPLETE: CPT | Performed by: NURSE PRACTITIONER

## 2025-03-04 RX ORDER — LISINOPRIL 5 MG/1
5 TABLET ORAL DAILY
Start: 2025-03-04

## 2025-03-04 NOTE — PROGRESS NOTES
Subjective:     Encounter Date: 03/04/2025      Patient ID: Meme Morales is a 79 y.o. female.    Chief Complaint:follow up HTN, SSS  History of Present Illness  This is a 78 y/o female who follows with Dr. Steve and is known to me. She has a pmhx of hypertension, hyperlipidemia, sick sinus syndrome s/p [employer] PPM, cerebral aneurysm s/p coiling, chronic kidney disease, mild mitral and moderate tricuspid regurgitation. She also has thoracic outlet syndrome secondary to cervical rib.     She is here today for a follow up visit. She has being feeling well from a cardiac standpoint since she was last seen. Unfortunately, she suffered a fall recently. She was walking up steps to go in her house. The wind blew the storm door that she was holding and threw her back. Fortunately, nothing was broken. She is doing physical therapy. She says her blood pressure has been in the 110s-130s systolic. She still has some shortness of breath with exertion and this is stable. She denies any dizziness or syncope.     Prior history:  She was previously a patient of Dr. Conner and was last seen her in June 2022. She was not doing well at the time. Dr. Jain had changed her amlodipine to lisinopril/HCTZ and she was having episodes of SBP in the 70s. She had started taking only half a tablet prior to being seen in the office. This seemed to help so she was instructed to continue to monitor her pressures. She also had complaints of shortness of breath so an echocardiogram and nuclear stress test were ordered. Echocardiogram showed an EF of 62%, grade 1 diastolic dysfunction, mild mitral regurgitation, moderate tricuspid regurgitation and mildly elevated RVSP. Nuclear stress test showed a small-sized, mild to moderately severe area of ischemia in the apex, hyperdynamic EF > 70%, and considered a low risk study.    I saw her in December and she was having issues with hypotension. She requested to take lisinopril and HCTZ  separately, using the HCTZ on an as needed basis. We tried this for a week. She reported back with her BP readings which were in the 150-160-80s. I instructed her to start taking the HCTZ daily. She then saw Dr. Steve in March 2023. She was having complaints of dyspnea on exertion. Dr. Steve discussed with the patient further work-up vs working on BP control. She opted to work on BP. She had not been taking HCTZ at the time so Dr. Steve continued her on lisinopril and added amlodipine 2.5 mg. Daily.    When I saw her in follow up, her BP had improved. She reported some chest pain and shortness of breath along with pretty profound fatigue. We opted to proceed with a stress test which showed no evidence of ischemia.     She last saw Dr. Steve in August 2023 and was doing well overall. She did report continued dyspnea on exertion which was largely stable. Her pacemaker was interrogated and they changed the accleratometer threshold to help with her shortness of breath.     I have reviewed and updated as appropriate allergies, current medications, past family history, past medical history, past surgical history and problem list.    Review of Systems   Constitutional: Negative for fever, malaise/fatigue, weight gain and weight loss.   HENT:  Negative for congestion, hoarse voice and sore throat.    Eyes:  Negative for blurred vision and double vision.   Cardiovascular:  Positive for palpitations. Negative for chest pain, dyspnea on exertion, leg swelling, orthopnea and syncope.   Respiratory:  Positive for shortness of breath. Negative for cough and wheezing.    Gastrointestinal:  Negative for abdominal pain, hematemesis, hematochezia and melena.   Genitourinary:  Negative for dysuria and hematuria.   Neurological:  Negative for dizziness, headaches, light-headedness and numbness.   Psychiatric/Behavioral:  Negative for depression. The patient is not nervous/anxious.          Current Outpatient Medications:      Acetaminophen 500 MG capsule, Daily., Disp: , Rfl:     ascorbic acid (VITAMIN C) 1000 MG tablet, Daily., Disp: , Rfl:     aspirin  MG tablet, Take 1 tablet by mouth Daily., Disp: 30 tablet, Rfl: 6    atorvastatin (LIPITOR) 40 MG tablet, Take 1 tablet by mouth Daily., Disp: 90 tablet, Rfl: 3    Cholecalciferol (VITAMIN D3) 2000 units tablet, Take 1 tablet by mouth Daily., Disp: , Rfl:     famotidine (PEPCID) 40 MG tablet, , Disp: , Rfl:     ferrous sulfate 325 (65 FE) MG tablet, Take 1 tablet by mouth Daily. With food, Disp: , Rfl:     flunisolide (NASALIDE) 25 MCG/ACT (0.025%) solution nasal spray, USE 2 SPRAYS NASALLY EVERY 12  HOURS, Disp: 100 mL, Rfl: 1    hydroCHLOROthiazide (MICROZIDE) 12.5 MG capsule, TAKE 1 CAPSULE BY MOUTH DAILY, Disp: 90 capsule, Rfl: 3    levothyroxine (SYNTHROID, LEVOTHROID) 75 MCG tablet, Take 1 tablet by mouth Daily., Disp: 90 tablet, Rfl: 3    lisinopril (PRINIVIL,ZESTRIL) 10 MG tablet, TAKE 1 TABLET BY MOUTH DAILY (Patient taking differently: Take 1 tablet by mouth Daily. Takes 5 mg), Disp: 90 tablet, Rfl: 3    melatonin 3 MG tablet, Take 10 mg by mouth Every Night., Disp: , Rfl:     omeprazole (priLOSEC) 40 MG capsule, Take 1 capsule by mouth 2 (Two) Times a Day., Disp: , Rfl:     ondansetron (ZOFRAN) 4 MG tablet, Take 1 tablet by mouth., Disp: , Rfl:     rOPINIRole (REQUIP) 1 MG tablet, Take 1 tablet by mouth 3 (Three) Times a Day., Disp: 270 tablet, Rfl: 3    Turmeric Curcumin 500 MG capsule, Take  by mouth., Disp: , Rfl:     amLODIPine (NORVASC) 2.5 MG tablet, TAKE 1 TABLET BY MOUTH DAILY (Patient not taking: Reported on 3/4/2025), Disp: 90 tablet, Rfl: 1    Past Medical History:   Diagnosis Date    Acid reflux     Arthritis     Arthritis of carpometacarpal (CMC) joint of right thumb     Francisco esophagus     Basal cell carcinoma     Brain aneurysm     Burning with urination     Cancer     non malignant skin cancers    Cerebral aneurysm     Congestive heart failure      Depression     Difficulty swallowing     Disease of thyroid gland     HISTORY OF NODULES, SURGERY.     Encounter for Medicare annual wellness exam     Esophageal reflux     Exposure to chickenpox     H/O TB skin testing     WITH POSITIVE RESULT. NEG CXR.    Heart murmur     High blood pressure     History of hyperparathyroidism     History of migraine headaches     Hoarseness     Hypercalcemia     Hyperlipidemia     Hypertension     Hypothyroidism     Leaky heart valve     Low back pain     Osteopenia with high risk of fracture     Overweight with body mass index (BMI) of 25 to 25.9 in adult     Pacemaker     IMPLANTED LEFT SIDE    Palpitations     Pseudocholinesterase deficiency     AUNT ON FATHER'S SIDE OF FAMILY    Pulmonary hypertension     Rheumatic multiple valve disease     RLS (restless legs syndrome)     Rubella     Seasonal allergies     Sick sinus syndrome due to SA node dysfunction     REASON FOR PACEMAKER    Skin disorder     Slow to wake up after anesthesia     Stage 3 chronic kidney disease     Symptoms of urinary tract infection     Thoracic outlet syndrome     Thyroid disease     Tinea cruris     Tricuspid valve regurgitation     Urinary hesitancy     Valvular heart disease     Vitamin D deficiency        Past Surgical History:   Procedure Laterality Date    CARDIAC CATHETERIZATION      Post-Proc Data:  Lesions successfully dilated     CATARACT EXTRACTION WITH INTRAOCULAR LENS IMPLANT      LEFT AND RIGHT    CEREBRAL ANGIOGRAM N/A 6/26/2019    Procedure: CEREBRAL ANGIOGRAM;  Surgeon: Ranjit Key MD;  Location: [facility] OR 18/19;  Service: Neurosurgery    COLONOSCOPY      CYST REMOVAL Right     REMOVED UNDER RIGHT ARM. BENIGN    EMBOLIZATION CEREBRAL N/A 11/26/2018    Procedure: Cerebral angiography and embolization of cerebral aneurysm with possible Neuroform stent;  Surgeon: Ranjit Key MD;  Location: [facility] OR 18/19;  Service: Neurosurgery    HYSTERECTOMY      PACEMAKER  "IMPLANTATION Left     [employer]    PARATHYROID GLAND SURGERY      ONE GLAND RIGHT    THYROIDECTOMY, PARTIAL Right     TONSILLECTOMY AND ADENOIDECTOMY      UPPER GASTROINTESTINAL ENDOSCOPY         Family History   Problem Relation Age of Onset    Hypertension Mother     Heart disease Father     Stroke Father     Heart attack Father     Hypertension Father     Hypertension Sister     Stroke Maternal Grandmother     Diabetes type II Child     Hypertension Child     Malig Hyperthermia Neg Hx        Social History     Tobacco Use    Smoking status: Former     Current packs/day: 0.00     Average packs/day: 1.5 packs/day for 25.0 years (37.5 ttl pk-yrs)     Types: Cigarettes     Start date:      Quit date:      Years since quittin.1    Smokeless tobacco: Never   Vaping Use    Vaping status: Never Used   Substance Use Topics    Alcohol use: Yes     Comment: occasional    Drug use: No         ECG 12 Lead    Date/Time: 3/4/2025 1:55 PM  Performed by: Angie Singer APRN    Authorized by: Angie Singer APRN  Comparison: compared with previous ECG from 9/3/2024  Similar to previous ECG  Rhythm: paced  Conduction: right bundle branch block and left anterior fascicular block             Objective:     Visit Vitals  /74 (BP Location: Left arm, Patient Position: Sitting, Cuff Size: Adult)   Pulse 61   Ht 167.6 cm (66\")   Wt 72.6 kg (160 lb)   BMI 25.82 kg/m²               Physical Exam  Constitutional:       Appearance: Normal appearance. She is normal weight.   HENT:      Head: Normocephalic.   Neck:      Vascular: No carotid bruit.   Cardiovascular:      Rate and Rhythm: Normal rate and regular rhythm.      Chest Wall: PMI is not displaced.      Pulses: Normal pulses.           Radial pulses are 2+ on the right side and 2+ on the left side.        Posterior tibial pulses are 2+ on the right side and 2+ on the left side.      Heart sounds: Normal heart sounds. No murmur heard.     No friction rub. No " gallop.   Pulmonary:      Effort: Pulmonary effort is normal.      Breath sounds: Normal breath sounds.   Abdominal:      General: Bowel sounds are normal. There is no distension.      Palpations: Abdomen is soft.   Musculoskeletal:      Right lower leg: No edema.      Left lower leg: No edema.   Skin:     General: Skin is warm and dry.      Capillary Refill: Capillary refill takes less than 2 seconds.   Neurological:      Mental Status: She is alert and oriented to person, place, and time.   Psychiatric:         Mood and Affect: Mood normal.         Behavior: Behavior normal.         Thought Content: Thought content normal.        Lab Review:   Lipid Panel          6/24/2024    09:29   Lipid Panel   Total Cholesterol 156    Triglycerides 55    HDL Cholesterol 79    VLDL Cholesterol 11    LDL Cholesterol  66          Cardiac Procedures:   Echocardiogram 6/17/22:  Calculated left ventricular EF = 62% Estimated left ventricular EF was in agreement with the calculated left ventricular EF.  Left ventricular diastolic function is consistent with (grade I) impaired relaxation.  The right ventricular cavity is mildly dilated.  Left atrial volume is moderately increased.  Saline test results are negative.  There is mild, bileaflet mitral valve thickening present.  Mild mitral valve regurgitation is present.  Moderate tricuspid valve regurgitation is present.  Estimated right ventricular systolic pressure from tricuspid regurgitation is mildly elevated (35-45 mmHg).     Exercise stress perfusion study 6/17/22:  Breast attenuation artifact is present.  Myocardial perfusion imaging indicates a small-sized, mild-to-moderately severe area of ischemia located in the apex.  Left ventricular ejection fraction is hyperdynamic (Calculated EF > 70%). .  Impressions are consistent with a low risk study.      Assessment:         There are no diagnoses linked to this encounter.          Plan:       Dyspnea on exertion: largely stable.  She is still being treated for iron deficiency anemia. Plans are to recheck labs to reassess. Will hold off on any further cardiac workup at this time.  HTN: blood pressure has improved on current regimen. No changes.  HLD on atorvastatin. Goal LDL < 100.  SSS s/p PPM: [employer] PPM. Device interrogation reviewed and was overall normal today.  VHD: mild MR and moderate TR per echocardiogram in June 2022. Compensated. Will continue to monitor.    Thank you for allowing me to participate in this patient's care. Please call with any questions or concerns. Ms. Morales will follow up with Dr. Steve in 6 months.          Your medication list            Accurate as of March 4, 2025 10:41 AM. If you have any questions, ask your nurse or doctor.                CHANGE how you take these medications        Instructions Last Dose Given Next Dose Due   lisinopril 10 MG tablet  Commonly known as: PRINIVIL,ZESTRIL  What changed: additional instructions      TAKE 1 TABLET BY MOUTH DAILY              CONTINUE taking these medications        Instructions Last Dose Given Next Dose Due   Acetaminophen 500 MG capsule      Daily.       amLODIPine 2.5 MG tablet  Commonly known as: NORVASC      TAKE 1 TABLET BY MOUTH DAILY       ascorbic acid 1000 MG tablet  Commonly known as: VITAMIN C      Daily.       aspirin 325 MG EC tablet      Take 1 tablet by mouth Daily.       atorvastatin 40 MG tablet  Commonly known as: LIPITOR      Take 1 tablet by mouth Daily.       famotidine 40 MG tablet  Commonly known as: PEPCID           ferrous sulfate 325 (65 FE) MG tablet      Take 1 tablet by mouth Daily. With food       flunisolide 25 MCG/ACT (0.025%) solution nasal spray  Commonly known as: NASALIDE      USE 2 SPRAYS NASALLY EVERY 12  HOURS       hydroCHLOROthiazide 12.5 MG capsule  Commonly known as: MICROZIDE      TAKE 1 CAPSULE BY MOUTH DAILY       levothyroxine 75 MCG tablet  Commonly known as: SYNTHROID, LEVOTHROID      Take 1 tablet by  mouth Daily.       melatonin 3 MG tablet      Take 10 mg by mouth Every Night.       omeprazole 40 MG capsule  Commonly known as: priLOSEC      Take 1 capsule by mouth 2 (Two) Times a Day.       ondansetron 4 MG tablet  Commonly known as: ZOFRAN      Take 1 tablet by mouth.       rOPINIRole 1 MG tablet  Commonly known as: REQUIP      Take 1 tablet by mouth 3 (Three) Times a Day.       Turmeric Curcumin 500 MG capsule      Take  by mouth.       Vitamin D3 50 MCG (2000 UT) tablet      Take 1 tablet by mouth Daily.                  Angie Singer, [place]  03/04/25  9:49 AM EST

## 2025-04-25 ENCOUNTER — OFFICE VISIT (OUTPATIENT)
Dept: FAMILY MEDICINE CLINIC | Facility: CLINIC | Age: 80
End: 2025-04-25
Payer: MEDICARE

## 2025-04-25 VITALS
DIASTOLIC BLOOD PRESSURE: 58 MMHG | OXYGEN SATURATION: 98 % | WEIGHT: 158 LBS | HEIGHT: 66 IN | BODY MASS INDEX: 25.39 KG/M2 | SYSTOLIC BLOOD PRESSURE: 116 MMHG | HEART RATE: 64 BPM

## 2025-04-25 DIAGNOSIS — E03.9 ACQUIRED HYPOTHYROIDISM: ICD-10-CM

## 2025-04-25 DIAGNOSIS — Z78.0 POSTMENOPAUSAL: ICD-10-CM

## 2025-04-25 DIAGNOSIS — G25.81 RESTLESS LEGS SYNDROME (RLS): ICD-10-CM

## 2025-04-25 DIAGNOSIS — E78.2 MIXED HYPERLIPIDEMIA: ICD-10-CM

## 2025-04-25 DIAGNOSIS — Z00.00 MEDICARE ANNUAL WELLNESS VISIT, SUBSEQUENT: Primary | ICD-10-CM

## 2025-04-25 DIAGNOSIS — D64.9 ANEMIA, UNSPECIFIED TYPE: ICD-10-CM

## 2025-04-25 DIAGNOSIS — Z13.1 SCREENING FOR DIABETES MELLITUS: ICD-10-CM

## 2025-04-25 RX ORDER — ATORVASTATIN CALCIUM 40 MG/1
40 TABLET, FILM COATED ORAL DAILY
Qty: 90 TABLET | Refills: 3 | Status: SHIPPED | OUTPATIENT
Start: 2025-04-25

## 2025-04-25 RX ORDER — ROPINIROLE 1 MG/1
1 TABLET, FILM COATED ORAL 3 TIMES DAILY
Qty: 270 TABLET | Refills: 3 | Status: SHIPPED | OUTPATIENT
Start: 2025-04-25

## 2025-04-25 RX ORDER — LEVOTHYROXINE SODIUM 75 UG/1
75 TABLET ORAL DAILY
Qty: 90 TABLET | Refills: 3 | Status: SHIPPED | OUTPATIENT
Start: 2025-04-25

## 2025-04-25 RX ORDER — CALCIUM POLYCARBOPHIL 625 MG
TABLET ORAL EVERY 6 HOURS SCHEDULED
COMMUNITY

## 2025-04-25 NOTE — PROGRESS NOTES
Subjective   The ABCs of the Annual Wellness Visit  Medicare Wellness Visit      Meme Morales is a 80 y.o. patient who presents for a Medicare Wellness Visit.    The following portions of the patient's history were reviewed and   updated as appropriate: allergies, current medications, past family history, past medical history, past social history, past surgical history, and problem list.    Compared to one year ago, the patient's physical   health is the same.  Compared to one year ago, the patient's mental   health is the same.    Recent Hospitalizations:  She was not admitted to the hospital during the last year.     Current Medical Providers:  Patient Care Team:  Roula Alvares APRN ([place]) as PCP - General (Family Medicine)  Xochilt Conner MD as Consulting Physician (Cardiology)  Corry Jain MD as Consulting Physician (Nephrology)    Outpatient Medications Prior to Visit   Medication Sig Dispense Refill    Acetaminophen 500 MG capsule Daily.      ascorbic acid (VITAMIN C) 1000 MG tablet Daily.      aspirin  MG tablet Take 1 tablet by mouth Daily. 30 tablet 6    calcium polycarbophil (FiberCon) 625 MG tablet Every 6 (Six) Hours.      Cholecalciferol (VITAMIN D3) 2000 units tablet Take 1 tablet by mouth Daily.      famotidine (PEPCID) 40 MG tablet       ferrous sulfate 325 (65 FE) MG tablet Take 1 tablet by mouth Daily. With food      flunisolide (NASALIDE) 25 MCG/ACT (0.025%) solution nasal spray USE 2 SPRAYS NASALLY EVERY 12  HOURS 100 mL 1    hydroCHLOROthiazide (MICROZIDE) 12.5 MG capsule TAKE 1 CAPSULE BY MOUTH DAILY 90 capsule 3    lisinopril (PRINIVIL,ZESTRIL) 5 MG tablet Take 1 tablet by mouth Daily. (Patient taking differently: Take 0.5 tablets by mouth Daily.)      Magnesium Oxide -Mg Supplement 400 MG capsule 30 capsules.      melatonin 3 MG tablet Take 10 mg by mouth Every Night.      omeprazole (priLOSEC) 40 MG capsule Take 1 capsule by mouth 2 (Two) Times a Day.       ondansetron (ZOFRAN) 4 MG tablet Take 1 tablet by mouth.      Turmeric Curcumin 500 MG capsule Take  by mouth.      atorvastatin (LIPITOR) 40 MG tablet Take 1 tablet by mouth Daily. 90 tablet 3    levothyroxine (SYNTHROID, LEVOTHROID) 75 MCG tablet Take 1 tablet by mouth Daily. 90 tablet 3    rOPINIRole (REQUIP) 1 MG tablet Take 1 tablet by mouth 3 (Three) Times a Day. 270 tablet 3     No facility-administered medications prior to visit.     No opioid medication identified on active medication list. I have reviewed chart for other potential  high risk medication/s and harmful drug interactions in the elderly.      Aspirin is on active medication list. Aspirin use is indicated based on review of current medical condition/s. Pros and cons of this therapy have been discussed today. Benefits of this medication outweigh potential harm.  Patient has been encouraged to continue taking this medication.  .      Patient Active Problem List   Diagnosis    Essential hypertension    Sick sinus syndrome due to SA node dysfunction    Cerebral aneurysm, nonruptured    Other cerebrovascular disease    CKD (chronic kidney disease) stage 3, GFR 30-59 ml/min    Tricuspid regurgitation    Mitral valve regurgitation    Presence of cardiac pacemaker    Right carotid bruit    Thoracic outlet syndrome associated with cervical rib    Mixed hyperlipidemia    Hyperparathyroidism    Thyroid nodule    Chronic allergic rhinitis due to pollen    Acquired hypothyroidism    Gastroesophageal reflux disease without esophagitis    Dependent edema    Valvular heart disease    Restless legs syndrome (RLS)    Arthritis of carpometacarpal (CMC) joint of both thumbs    Atrial tachycardia    Dyspnea on exertion    Abdominal pain, unspecified abdominal location    Colitis    MACY (acute kidney injury)     Advance Care Planning Advance Directive is not on file.  ACP discussion was held with the patient during this visit. Patient has an advance directive (not in  "EMR), copy requested.            Objective   Vitals:    25 0943   BP: 116/58   Pulse: 64   SpO2: 98%   Weight: 71.7 kg (158 lb)   Height: 167.6 cm (66\")   PainSc: 5    PainLoc: Knee  Comment: right       Estimated body mass index is 25.5 kg/m² as calculated from the following:    Height as of this encounter: 167.6 cm (66\").    Weight as of this encounter: 71.7 kg (158 lb).    BMI is >= 25 and <30. (Overweight) The following options were offered after discussion;: nutrition counseling/recommendations           Does the patient have evidence of cognitive impairment? No                                                                                                Health  Risk Assessment    Smoking Status:  Social History     Tobacco Use   Smoking Status Former    Current packs/day: 0.00    Average packs/day: 1.5 packs/day for 25.0 years (37.5 ttl pk-yrs)    Types: Cigarettes    Start date:     Quit date:     Years since quittin.3   Smokeless Tobacco Never     Alcohol Consumption:  Social History     Substance and Sexual Activity   Alcohol Use Yes    Comment: occasional       Fall Risk Screen  STEADI Fall Risk Assessment was completed, and patient is at HIGH risk for falls. Assessment completed on:2025    Depression Screening   Little interest or pleasure in doing things? Not at all   Feeling down, depressed, or hopeless? Not at all   PHQ-2 Total Score 0      Health Habits and Functional and Cognitive Screenin/25/2025     9:49 AM   Functional & Cognitive Status   Do you have difficulty preparing food and eating? No   Do you have difficulty bathing yourself, getting dressed or grooming yourself? No   Do you have difficulty using the toilet? No   Do you have difficulty moving around from place to place? No   Do you have trouble with steps or getting out of a bed or a chair? No   Current Diet Well Balanced Diet   Dental Exam Up to date   Eye Exam Up to date   Exercise (times per week) 2 " times per week   Current Exercises Include Other        Exercise Comment PT   Do you need help using the phone?  No   Are you deaf or do you have serious difficulty hearing?  No   Do you need help to go to places out of walking distance? No   Do you need help shopping? No   Do you need help preparing meals?  No   Do you need help with housework?  No   Do you need help with laundry? No   Do you need help taking your medications? No   Do you need help managing money? No   Do you ever drive or ride in a car without wearing a seat belt? No   Have you felt unusual stress, anger or loneliness in the last month? No   Who do you live with? Spouse   If you need help, do you have trouble finding someone available to you? No   Have you been bothered in the last four weeks by sexual problems? No   Do you have difficulty concentrating, remembering or making decisions? No           Age-appropriate Screening Schedule:  Refer to the list below for future screening recommendations based on patient's age, sex and/or medical conditions. Orders for these recommended tests are listed in the plan section. The patient has been provided with a written plan.    Health Maintenance List  Health Maintenance   Topic Date Due    DXA SCAN  09/20/2024    COVID-19 Vaccine (7 - 2024-25 season) 04/20/2025    LIPID PANEL  06/24/2025    INFLUENZA VACCINE  07/01/2025    ANNUAL WELLNESS VISIT  04/25/2026    TDAP/TD VACCINES (3 - Td or Tdap) 06/01/2032    COLORECTAL CANCER SCREENING  09/06/2034    RSV Vaccine - Adults  Completed    Pneumococcal Vaccine 50+  Completed    ZOSTER VACCINE  Completed                                                                                                                                                CMS Preventative Services Quick Reference  Risk Factors Identified During Encounter  None Identified    The above risks/problems have been discussed with the patient.  Pertinent information has been shared with the patient  "in the After Visit Summary.  An After Visit Summary and PPPS were made available to the patient.    Follow Up:   Next Medicare Wellness visit to be scheduled in 1 year.         Additional E&M Note during same encounter follows:  Patient has additional, significant, and separately identifiable condition(s)/problem(s) that require work above and beyond the Medicare Wellness Visit     Chief Complaint  Medicare Wellness-subsequent    Subjective   HPI  Meme is also being seen today for additional medical problem/s.    Review of Systems   Respiratory:  Negative for shortness of breath.    Cardiovascular:  Negative for chest pain.   Musculoskeletal:  Positive for arthralgias.   Neurological:  Negative for confusion.              Objective   Vital Signs:  /58   Pulse 64   Ht 167.6 cm (66\")   Wt 71.7 kg (158 lb)   SpO2 98%   BMI 25.50 kg/m²   Physical Exam  Vitals and nursing note reviewed.   Constitutional:       Appearance: Normal appearance. She is well-developed.   Neck:      Thyroid: No thyromegaly.      Vascular: No carotid bruit.   Cardiovascular:      Rate and Rhythm: Normal rate and regular rhythm.   Pulmonary:      Effort: Pulmonary effort is normal.      Breath sounds: Normal breath sounds.   Neurological:      Mental Status: She is alert and oriented to person, place, and time.   Psychiatric:         Mood and Affect: Mood normal.         Behavior: Behavior normal.         Thought Content: Thought content normal.         Judgment: Judgment normal.         The following data was reviewed by: LINWOOD Gamez on 04/25/2025:    Common labs          7/2/2024    13:25 8/16/2024    10:05 11/14/2024    15:11   Common Labs   Potassium 5.2      WBC 8.15  6.4  9.17    Hemoglobin 10.7  10.8  11.1    Hematocrit 33.9  33.1  33.4    Platelets 244  249  265           Assessment and Plan Additional age appropriate preventative wellness advice topics were discussed during today's preventative wellness " exam(some topics already addressed during AWV portion of the note above):        Mixed hyperlipidemia   Lipid abnormalities are stable    Plan:  Continue same medication/s without change.      Discussed medication dosage, use, side effects, and goals of treatment in detail.    Counseled patient on lifestyle modifications to help control hyperlipidemia.     Patient Treatment Goals:   LDL goal is less than 70    Followup in 6 months.    Orders:    Comprehensive metabolic panel    Lipid panel    CBC and Differential    TSH    T4, free    T3, free    Iron level    Ferritin    Hemoglobin A1c    atorvastatin (LIPITOR) 40 MG tablet; Take 1 tablet by mouth Daily.    Acquired hypothyroidism    Orders:    Comprehensive metabolic panel    Lipid panel    CBC and Differential    TSH    T4, free    T3, free    Iron level    Ferritin    Hemoglobin A1c    levothyroxine (SYNTHROID, LEVOTHROID) 75 MCG tablet; Take 1 tablet by mouth Daily.    Restless legs syndrome (RLS)    Orders:    Comprehensive metabolic panel    Lipid panel    CBC and Differential    TSH    T4, free    T3, free    Iron level    Ferritin    Hemoglobin A1c    rOPINIRole (REQUIP) 1 MG tablet; Take 1 tablet by mouth 3 (Three) Times a Day.    Medicare annual wellness visit, subsequent         Anemia, unspecified type    Orders:    Comprehensive metabolic panel    Lipid panel    CBC and Differential    TSH    T4, free    T3, free    Iron level    Ferritin    Hemoglobin A1c    Screening for diabetes mellitus    Orders:    Comprehensive metabolic panel    Lipid panel    CBC and Differential    TSH    T4, free    T3, free    Iron level    Ferritin    Hemoglobin A1c    Postmenopausal    Orders:    DEXA Bone Density Axial    She is due for bone density scan.  Her last one was through [facility] in 2022 and showed osteopenia.    She is up-to-date with cardiology.  She is due for labs and ate a few hours ago but she lives off [facility] and it is much easier for her to get  her labs here.       Follow Up   Return in about 1 year (around 4/25/2026) for Next scheduled follow up.  Patient was given instructions and counseling regarding her condition or for health maintenance advice. Please see specific information pulled into the AVS if appropriate.

## 2025-04-25 NOTE — PATIENT INSTRUCTIONS
Medicare Wellness  Personal Prevention Plan of Service     Date of Office Visit:    Encounter Provider:  LINWOOD Gamez  Place of Service:  [facility]  Patient Name: Meme Morales  :  1945    As part of the Medicare Wellness portion of your visit today, we are providing you with this personalized preventive plan of services (PPPS). This plan is based upon recommendations of the [place] Preventive Services Task Force (USPSTF) and the Advisory Committee on Immunization Practices (ACIP).    This lists the preventive care services that should be considered, and provides dates of when you are due. Items listed as completed are up-to-date and do not require any further intervention.    Health Maintenance   Topic Date Due    DXA SCAN  2024    COVID-19 Vaccine ( season) 2025    LIPID PANEL  2025    INFLUENZA VACCINE  2025    ANNUAL WELLNESS VISIT  2026    TDAP/TD VACCINES (3 - Td or Tdap) 2032    COLORECTAL CANCER SCREENING  2034    RSV Vaccine - Adults  Completed    Pneumococcal Vaccine 50+  Completed    ZOSTER VACCINE  Completed       No orders of the defined types were placed in this encounter.      Return in about 1 year (around 2026) for Next scheduled follow up.

## 2025-04-25 NOTE — ASSESSMENT & PLAN NOTE
Orders:    Comprehensive metabolic panel    Lipid panel    CBC and Differential    TSH    T4, free    T3, free    Iron level    Ferritin    Hemoglobin A1c    rOPINIRole (REQUIP) 1 MG tablet; Take 1 tablet by mouth 3 (Three) Times a Day.

## 2025-04-25 NOTE — ASSESSMENT & PLAN NOTE
Lipid abnormalities are stable    Plan:  Continue same medication/s without change.      Discussed medication dosage, use, side effects, and goals of treatment in detail.    Counseled patient on lifestyle modifications to help control hyperlipidemia.     Patient Treatment Goals:   LDL goal is less than 70    Followup in 6 months.    Orders:    Comprehensive metabolic panel    Lipid panel    CBC and Differential    TSH    T4, free    T3, free    Iron level    Ferritin    Hemoglobin A1c    atorvastatin (LIPITOR) 40 MG tablet; Take 1 tablet by mouth Daily.

## 2025-04-25 NOTE — ASSESSMENT & PLAN NOTE
Orders:    Comprehensive metabolic panel    Lipid panel    CBC and Differential    TSH    T4, free    T3, free    Iron level    Ferritin    Hemoglobin A1c    levothyroxine (SYNTHROID, LEVOTHROID) 75 MCG tablet; Take 1 tablet by mouth Daily.

## 2025-04-26 LAB
ALBUMIN SERPL-MCNC: 4.3 G/DL (ref 3.5–5.2)
ALBUMIN/GLOB SERPL: 1.8 G/DL
ALP SERPL-CCNC: 103 U/L (ref 39–117)
ALT SERPL-CCNC: 12 U/L (ref 1–33)
AST SERPL-CCNC: 18 U/L (ref 1–32)
BASOPHILS # BLD AUTO: 0.07 10*3/MM3 (ref 0–0.2)
BASOPHILS NFR BLD AUTO: 1.1 % (ref 0–1.5)
BILIRUB SERPL-MCNC: 0.3 MG/DL (ref 0–1.2)
BUN SERPL-MCNC: 32 MG/DL (ref 8–23)
BUN/CREAT SERPL: 25.4 (ref 7–25)
CALCIUM SERPL-MCNC: 9.6 MG/DL (ref 8.6–10.5)
CHLORIDE SERPL-SCNC: 104 MMOL/L (ref 98–107)
CHOLEST SERPL-MCNC: 159 MG/DL (ref 0–200)
CO2 SERPL-SCNC: 26.7 MMOL/L (ref 22–29)
CREAT SERPL-MCNC: 1.26 MG/DL (ref 0.57–1)
EGFRCR SERPLBLD CKD-EPI 2021: 42.7 ML/MIN/1.73
EOSINOPHIL # BLD AUTO: 0.49 10*3/MM3 (ref 0–0.4)
EOSINOPHIL NFR BLD AUTO: 7.9 % (ref 0.3–6.2)
ERYTHROCYTE [DISTWIDTH] IN BLOOD BY AUTOMATED COUNT: 12.7 % (ref 12.3–15.4)
FERRITIN SERPL-MCNC: 448 NG/ML (ref 13–150)
GLOBULIN SER CALC-MCNC: 2.4 GM/DL
GLUCOSE SERPL-MCNC: 75 MG/DL (ref 65–99)
HBA1C MFR BLD: 6 % (ref 4.8–5.6)
HCT VFR BLD AUTO: 34.4 % (ref 34–46.6)
HDLC SERPL-MCNC: 75 MG/DL (ref 40–60)
HGB BLD-MCNC: 11.2 G/DL (ref 12–15.9)
IMM GRANULOCYTES # BLD AUTO: 0.01 10*3/MM3 (ref 0–0.05)
IMM GRANULOCYTES NFR BLD AUTO: 0.2 % (ref 0–0.5)
IRON SERPL-MCNC: 80 MCG/DL (ref 37–145)
LDLC SERPL CALC-MCNC: 69 MG/DL (ref 0–100)
LYMPHOCYTES # BLD AUTO: 1.09 10*3/MM3 (ref 0.7–3.1)
LYMPHOCYTES NFR BLD AUTO: 17.5 % (ref 19.6–45.3)
MCH RBC QN AUTO: 31.2 PG (ref 26.6–33)
MCHC RBC AUTO-ENTMCNC: 32.6 G/DL (ref 31.5–35.7)
MCV RBC AUTO: 95.8 FL (ref 79–97)
MONOCYTES # BLD AUTO: 0.46 10*3/MM3 (ref 0.1–0.9)
MONOCYTES NFR BLD AUTO: 7.4 % (ref 5–12)
NEUTROPHILS # BLD AUTO: 4.1 10*3/MM3 (ref 1.7–7)
NEUTROPHILS NFR BLD AUTO: 65.9 % (ref 42.7–76)
NRBC BLD AUTO-RTO: 0 /100 WBC (ref 0–0.2)
PLATELET # BLD AUTO: 255 10*3/MM3 (ref 140–450)
POTASSIUM SERPL-SCNC: 5.3 MMOL/L (ref 3.5–5.2)
PROT SERPL-MCNC: 6.7 G/DL (ref 6–8.5)
RBC # BLD AUTO: 3.59 10*6/MM3 (ref 3.77–5.28)
SODIUM SERPL-SCNC: 139 MMOL/L (ref 136–145)
T3FREE SERPL-MCNC: 2.6 PG/ML (ref 2–4.4)
T4 FREE SERPL-MCNC: 1.52 NG/DL (ref 0.92–1.68)
TRIGL SERPL-MCNC: 79 MG/DL (ref 0–150)
TSH SERPL DL<=0.005 MIU/L-ACNC: 1.4 UIU/ML (ref 0.27–4.2)
VLDLC SERPL CALC-MCNC: 15 MG/DL (ref 5–40)
WBC # BLD AUTO: 6.22 10*3/MM3 (ref 3.4–10.8)

## 2025-05-15 ENCOUNTER — HOSPITAL ENCOUNTER (OUTPATIENT)
Facility: HOSPITAL | Age: 80
Discharge: HOME | End: 2025-05-15
Admitting: NURSE PRACTITIONER
Payer: MEDICARE

## 2025-05-15 PROCEDURE — 77080 DXA BONE DENSITY AXIAL: CPT

## 2025-06-04 ENCOUNTER — TELEPHONE (OUTPATIENT)
Dept: NEUROSURGERY | Facility: CLINIC | Age: 80
End: 2025-06-04
Payer: MEDICARE

## 2025-06-04 DIAGNOSIS — I67.1 CEREBRAL ANEURYSM, NONRUPTURED: Primary | ICD-10-CM

## 2025-06-12 DIAGNOSIS — G25.81 RESTLESS LEGS SYNDROME (RLS): ICD-10-CM

## 2025-06-12 RX ORDER — ROPINIROLE 1 MG/1
1 TABLET, FILM COATED ORAL 3 TIMES DAILY
Qty: 270 TABLET | Refills: 3 | Status: SHIPPED | OUTPATIENT
Start: 2025-06-12

## 2025-06-12 NOTE — TELEPHONE ENCOUNTER
Rx Refill Note  Requested Prescriptions     Pending Prescriptions Disp Refills    rOPINIRole (REQUIP) 1 MG tablet [Pharmacy Med Name: rOPINIRole HCL 1 MG TABLET] 270 tablet 3     Sig: TAKE 1 TABLET BY MOUTH 3 TIMES A DAY      Last office visit with prescribing clinician: 4/25/2025   Last telemedicine visit with prescribing clinician: Visit date not found   Next office visit with prescribing clinician: Visit date not found     EMILY Batres(JAR[place])  06/12/25, 10:03 EDT

## 2025-07-29 LAB
MC_CV_MDC_IDC_RATE_1: 160
MC_CV_MDC_IDC_ZONE_ID: 1
MDC_IDC_MSMT_BATTERY_REMAINING_LONGEVITY: 12 MO
MDC_IDC_MSMT_BATTERY_REMAINING_PERCENTAGE: 18 %
MDC_IDC_MSMT_BATTERY_STATUS: NORMAL
MDC_IDC_MSMT_LEADCHNL_RA_DTM: NORMAL
MDC_IDC_MSMT_LEADCHNL_RA_IMPEDANCE_VALUE: 375
MDC_IDC_MSMT_LEADCHNL_RA_PACING_THRESHOLD_POLARITY: NORMAL
MDC_IDC_MSMT_LEADCHNL_RV_DTM: NORMAL
MDC_IDC_MSMT_LEADCHNL_RV_IMPEDANCE_VALUE: 640
MDC_IDC_MSMT_LEADCHNL_RV_PACING_THRESHOLD_POLARITY: NORMAL
MDC_IDC_PG_IMPLANT_DTM: NORMAL
MDC_IDC_PG_MFG: NORMAL
MDC_IDC_PG_MODEL: NORMAL
MDC_IDC_PG_SERIAL: NORMAL
MDC_IDC_PG_TYPE: NORMAL
MDC_IDC_SESS_DTM: NORMAL
MDC_IDC_SESS_TYPE: NORMAL
MDC_IDC_SET_BRADY_AT_MODE_SWITCH_RATE: 170
MDC_IDC_SET_BRADY_LOWRATE: 60
MDC_IDC_SET_BRADY_MAX_SENSOR_RATE: 120
MDC_IDC_SET_BRADY_MAX_TRACKING_RATE: 120
MDC_IDC_SET_BRADY_MODE: NORMAL
MDC_IDC_SET_BRADY_PAV_DELAY: 260
MDC_IDC_SET_BRADY_SAV_DELAY: 260
MDC_IDC_SET_LEADCHNL_RA_PACING_AMPLITUDE: 2.5
MDC_IDC_SET_LEADCHNL_RA_PACING_POLARITY: NORMAL
MDC_IDC_SET_LEADCHNL_RA_PACING_PULSEWIDTH: 0.4
MDC_IDC_SET_LEADCHNL_RA_SENSING_POLARITY: NORMAL
MDC_IDC_SET_LEADCHNL_RA_SENSING_SENSITIVITY: 0.25
MDC_IDC_SET_LEADCHNL_RV_PACING_AMPLITUDE: 2.5
MDC_IDC_SET_LEADCHNL_RV_PACING_POLARITY: NORMAL
MDC_IDC_SET_LEADCHNL_RV_PACING_PULSEWIDTH: 0.4
MDC_IDC_SET_LEADCHNL_RV_SENSING_POLARITY: NORMAL
MDC_IDC_SET_LEADCHNL_RV_SENSING_SENSITIVITY: 0.6
MDC_IDC_SET_ZONE_STATUS: NORMAL
MDC_IDC_SET_ZONE_TYPE: NORMAL
MDC_IDC_STAT_AT_BURDEN_PERCENT: 0
MDC_IDC_STAT_BRADY_RA_PERCENT_PACED: 71
MDC_IDC_STAT_BRADY_RV_PERCENT_PACED: 22

## 2025-08-05 ENCOUNTER — HOSPITAL ENCOUNTER (OUTPATIENT)
Dept: MRI IMAGING | Facility: HOSPITAL | Age: 80
Discharge: HOME | End: 2025-08-05
Admitting: NEUROLOGICAL SURGERY
Payer: MEDICARE

## 2025-08-05 VITALS
RESPIRATION RATE: 16 BRPM | SYSTOLIC BLOOD PRESSURE: 180 MMHG | OXYGEN SATURATION: 98 % | HEART RATE: 60 BPM | DIASTOLIC BLOOD PRESSURE: 76 MMHG

## 2025-08-05 DIAGNOSIS — I67.1 CEREBRAL ANEURYSM, NONRUPTURED: ICD-10-CM

## 2025-08-05 PROCEDURE — 76015 MR SFTY MPLT&/FB ASMT STF EA: CPT

## 2025-08-05 PROCEDURE — 70544 MR ANGIOGRAPHY HEAD W/O DYE: CPT

## 2025-08-27 ENCOUNTER — OFFICE VISIT (OUTPATIENT)
Dept: NEUROSURGERY | Facility: CLINIC | Age: 80
End: 2025-08-27
Payer: MEDICARE

## 2025-08-27 VITALS
WEIGHT: 158.4 LBS | OXYGEN SATURATION: 99 % | HEIGHT: 66 IN | TEMPERATURE: 96.6 F | BODY MASS INDEX: 25.46 KG/M2 | SYSTOLIC BLOOD PRESSURE: 154 MMHG | HEART RATE: 68 BPM | DIASTOLIC BLOOD PRESSURE: 68 MMHG

## 2025-08-27 DIAGNOSIS — I67.1 CEREBRAL ANEURYSM, NONRUPTURED: Primary | ICD-10-CM

## 2025-08-27 PROCEDURE — 1159F MED LIST DOCD IN RCRD: CPT | Performed by: NEUROLOGICAL SURGERY

## 2025-08-27 PROCEDURE — 99214 OFFICE O/P EST MOD 30 MIN: CPT | Performed by: NEUROLOGICAL SURGERY

## 2025-08-27 PROCEDURE — 3077F SYST BP >= 140 MM HG: CPT | Performed by: NEUROLOGICAL SURGERY

## 2025-08-27 PROCEDURE — 1160F RVW MEDS BY RX/DR IN RCRD: CPT | Performed by: NEUROLOGICAL SURGERY

## 2025-08-27 PROCEDURE — 3078F DIAST BP <80 MM HG: CPT | Performed by: NEUROLOGICAL SURGERY

## (undated) DEVICE — GOWN,PREVENTION PLUS,XXLARGE,STERILE: Brand: MEDLINE

## (undated) DEVICE — Device

## (undated) DEVICE — KT NEURO CUST

## (undated) DEVICE — CATH ANGIO TRCN NB BCN .038 5F 100CM DAV

## (undated) DEVICE — DRSNG SURESITE WNDW 4X4.5

## (undated) DEVICE — PK ANGIO CERBRL RAD 40

## (undated) DEVICE — GLV SURG PREMIERPRO ORTHO LTX PF SZ8.5 BRN

## (undated) DEVICE — SYR LL 3CC

## (undated) DEVICE — EXTENSION SET, MALE LUER LOCK ADAPTER WITH RETRACTABLE COLLAR

## (undated) DEVICE — 0.2 MICRON INTRAVENOUS FILTER FOR 96 HOUR USE WITH MICRO-BORE EXTENSION TUBING AN LUER-LOCK ADAPTER: Brand: PALL POSIDYNE® ELD FILTER

## (undated) DEVICE — SOL NACL 0.9PCT 1000ML

## (undated) DEVICE — TOTAL TRAY, 16FR 10ML SIL FOLEY, URN: Brand: MEDLINE

## (undated) DEVICE — TBG ART PRESS 60 IN

## (undated) DEVICE — Device: Brand: D-STAT® DRY TOPICAL HEMOSTAT

## (undated) DEVICE — 360 SOFT DETACHABLE COIL
Type: IMPLANTABLE DEVICE | Status: NON-FUNCTIONAL
Brand: TARGET XL
Removed: 2018-11-26

## (undated) DEVICE — CATH GUIDE NAVIEN DIST STR 5F 8X115

## (undated) DEVICE — MYNXGRIP 6F/7F: Brand: MYNXGRIP

## (undated) DEVICE — BASN GW RINGMASTER

## (undated) DEVICE — RADIFOCUS GLIDEWIRE: Brand: GLIDEWIRE

## (undated) DEVICE — GLV SURG BIOGEL LTX PF 7 1/2

## (undated) DEVICE — STPCK 3WY HP ROT

## (undated) DEVICE — MYNXGRIP 5F: Brand: MYNXGRIP

## (undated) DEVICE — DETACHMENT SYSTEM: Brand: INZONE

## (undated) DEVICE — COPILOT BLEEDBACK CONTROL VALVE: Brand: COPILOT

## (undated) DEVICE — GLV SURG SENSICARE PI LF PF 7.5 GRN STRL

## (undated) DEVICE — ENCORE® LATEX ORTHO SIZE 8.5, STERILE LATEX POWDER-FREE SURGICAL GLOVE: Brand: ENCORE

## (undated) DEVICE — PINNACLE INTRODUCER SHEATH: Brand: PINNACLE

## (undated) DEVICE — RADIFOCUS TORQUE DEVICE MULTI-TORQUE VISE: Brand: RADIFOCUS TORQUE DEVICE

## (undated) DEVICE — GLV SURG SENSICARE MICRO PF LF 8 STRL

## (undated) DEVICE — DEV ANGIO FLOSWITCH HP BX24

## (undated) DEVICE — 360 SOFT DETACHABLE COIL
Type: IMPLANTABLE DEVICE | Status: NON-FUNCTIONAL
Brand: TARGET
Removed: 2018-11-26

## (undated) DEVICE — APPL CHLORAPREP W/TINT 26ML ORNG

## (undated) DEVICE — TOWEL,OR,DSP,ST,BLUE,STD,4/PK,20PK/CS: Brand: MEDLINE

## (undated) DEVICE — GW AMPLTZ SUPERSTIFF SHT/TPR STR .035IN 260CM

## (undated) DEVICE — DESTINATION CAROTID GUIDING SHEATH: Brand: DESTINATION

## (undated) DEVICE — PINNACLE R/O II INTRODUCER SHEATH WITH RADIOPAQUE MARKER: Brand: PINNACLE

## (undated) DEVICE — ST IV PRI VENOSET NV W/CLAMP 72IN

## (undated) DEVICE — Device: Brand: D-STAT® DRY SILVER CLEAR TOPICAL HEMOSTAT

## (undated) DEVICE — 2 TIP PRE-SHAPED 45 MICROCATHETER: Brand: EXCELSIOR SL-10

## (undated) DEVICE — SOL NS 500ML

## (undated) DEVICE — SPNG GZ STRL 2S 4X4 12PLY

## (undated) DEVICE — SHLD ANGIO 2LAYR CIR FEN

## (undated) DEVICE — CVR PROB 96IN LF STRL

## (undated) DEVICE — PRESSURE MONITORING SET: Brand: TRUWAVE

## (undated) DEVICE — STANDARD GUIDEWIRE WITH HYDROPHILIC COATING: Brand: SYNCHRO 2